# Patient Record
Sex: MALE | Race: BLACK OR AFRICAN AMERICAN | NOT HISPANIC OR LATINO | Employment: FULL TIME | URBAN - METROPOLITAN AREA
[De-identification: names, ages, dates, MRNs, and addresses within clinical notes are randomized per-mention and may not be internally consistent; named-entity substitution may affect disease eponyms.]

---

## 2017-12-12 ENCOUNTER — CLINICAL SUPPORT (OUTPATIENT)
Dept: URGENT CARE | Facility: CLINIC | Age: 27
End: 2017-12-12

## 2017-12-12 DIAGNOSIS — Z23 ENCOUNTER FOR IMMUNIZATION: Primary | ICD-10-CM

## 2017-12-12 PROCEDURE — 86580 TB INTRADERMAL TEST: CPT | Mod: S$GLB,,,

## 2017-12-12 PROCEDURE — 90686 IIV4 VACC NO PRSV 0.5 ML IM: CPT | Mod: S$GLB,,,

## 2017-12-15 LAB
TB INDURATION - 48 HR READ: NORMAL MM
TB INDURATION - 72 HR READ: NORMAL MM
TB SKIN TEST - 48 HR READ: NEGATIVE
TB SKIN TEST - 72 HR READ: NORMAL

## 2018-09-06 ENCOUNTER — HOSPITAL ENCOUNTER (EMERGENCY)
Facility: HOSPITAL | Age: 28
Discharge: HOME | End: 2018-09-06
Attending: EMERGENCY MEDICINE
Payer: OTHER GOVERNMENT

## 2018-09-06 VITALS
BODY MASS INDEX: 27.4 KG/M2 | RESPIRATION RATE: 18 BRPM | HEART RATE: 68 BPM | SYSTOLIC BLOOD PRESSURE: 132 MMHG | TEMPERATURE: 98 F | HEIGHT: 69 IN | DIASTOLIC BLOOD PRESSURE: 80 MMHG | WEIGHT: 185 LBS | OXYGEN SATURATION: 99 %

## 2018-09-06 DIAGNOSIS — R10.9 ABDOMINAL CRAMPING: Primary | ICD-10-CM

## 2018-09-06 DIAGNOSIS — R11.2 NAUSEA, VOMITING, AND DIARRHEA: ICD-10-CM

## 2018-09-06 DIAGNOSIS — R19.7 NAUSEA, VOMITING, AND DIARRHEA: ICD-10-CM

## 2018-09-06 LAB
ALBUMIN SERPL BCP-MCNC: 4.3 G/DL
ALP SERPL-CCNC: 54 U/L
ALT SERPL W/O P-5'-P-CCNC: 26 U/L
AMPHET+METHAMPHET UR QL: NEGATIVE
ANION GAP SERPL CALC-SCNC: 6 MMOL/L
AST SERPL-CCNC: 22 U/L
BARBITURATES UR QL SCN>200 NG/ML: NEGATIVE
BASOPHILS # BLD AUTO: 0.01 K/UL
BASOPHILS NFR BLD: 0.1 %
BENZODIAZ UR QL SCN>200 NG/ML: NEGATIVE
BILIRUB SERPL-MCNC: 0.5 MG/DL
BILIRUB UR QL STRIP: NEGATIVE
BUN SERPL-MCNC: 12 MG/DL
BZE UR QL SCN: NEGATIVE
CALCIUM SERPL-MCNC: 9.5 MG/DL
CANNABINOIDS UR QL SCN: NEGATIVE
CHLORIDE SERPL-SCNC: 103 MMOL/L
CLARITY UR: CLEAR
CO2 SERPL-SCNC: 29 MMOL/L
COLOR UR: YELLOW
CREAT SERPL-MCNC: 1.1 MG/DL
CREAT UR-MCNC: 231.1 MG/DL
DIFFERENTIAL METHOD: ABNORMAL
EOSINOPHIL # BLD AUTO: 0 K/UL
EOSINOPHIL NFR BLD: 0.4 %
ERYTHROCYTE [DISTWIDTH] IN BLOOD BY AUTOMATED COUNT: 12.9 %
EST. GFR  (AFRICAN AMERICAN): >60 ML/MIN/1.73 M^2
EST. GFR  (NON AFRICAN AMERICAN): >60 ML/MIN/1.73 M^2
GLUCOSE SERPL-MCNC: 105 MG/DL
GLUCOSE UR QL STRIP: NEGATIVE
HCT VFR BLD AUTO: 47.5 %
HGB BLD-MCNC: 15.6 G/DL
HGB UR QL STRIP: NEGATIVE
KETONES UR QL STRIP: NEGATIVE
LEUKOCYTE ESTERASE UR QL STRIP: NEGATIVE
LIPASE SERPL-CCNC: 18 U/L
LYMPHOCYTES # BLD AUTO: 1.2 K/UL
LYMPHOCYTES NFR BLD: 15.6 %
MCH RBC QN AUTO: 28.9 PG
MCHC RBC AUTO-ENTMCNC: 32.8 G/DL
MCV RBC AUTO: 88 FL
METHADONE UR QL SCN>300 NG/ML: NEGATIVE
MONOCYTES # BLD AUTO: 0.4 K/UL
MONOCYTES NFR BLD: 5.6 %
NEUTROPHILS # BLD AUTO: 6.1 K/UL
NEUTROPHILS NFR BLD: 78 %
NITRITE UR QL STRIP: NEGATIVE
OPIATES UR QL SCN: NEGATIVE
PCP UR QL SCN>25 NG/ML: NEGATIVE
PH UR STRIP: 7 [PH] (ref 5–8)
PLATELET # BLD AUTO: 292 K/UL
PMV BLD AUTO: 9.2 FL
POTASSIUM SERPL-SCNC: 3.8 MMOL/L
PROT SERPL-MCNC: 7.7 G/DL
PROT UR QL STRIP: ABNORMAL
RBC # BLD AUTO: 5.4 M/UL
SODIUM SERPL-SCNC: 138 MMOL/L
SP GR UR STRIP: 1.02 (ref 1–1.03)
TOXICOLOGY INFORMATION: NORMAL
URN SPEC COLLECT METH UR: ABNORMAL
UROBILINOGEN UR STRIP-ACNC: NEGATIVE EU/DL
WBC # BLD AUTO: 7.86 K/UL

## 2018-09-06 PROCEDURE — 81003 URINALYSIS AUTO W/O SCOPE: CPT | Mod: 59

## 2018-09-06 PROCEDURE — 80307 DRUG TEST PRSMV CHEM ANLYZR: CPT

## 2018-09-06 PROCEDURE — 80053 COMPREHEN METABOLIC PANEL: CPT

## 2018-09-06 PROCEDURE — 83690 ASSAY OF LIPASE: CPT

## 2018-09-06 PROCEDURE — 99284 EMERGENCY DEPT VISIT MOD MDM: CPT | Mod: 25

## 2018-09-06 PROCEDURE — 96372 THER/PROPH/DIAG INJ SC/IM: CPT

## 2018-09-06 PROCEDURE — 96360 HYDRATION IV INFUSION INIT: CPT

## 2018-09-06 PROCEDURE — 25000003 PHARM REV CODE 250: Performed by: EMERGENCY MEDICINE

## 2018-09-06 PROCEDURE — 85025 COMPLETE CBC W/AUTO DIFF WBC: CPT

## 2018-09-06 PROCEDURE — 63600175 PHARM REV CODE 636 W HCPCS: Performed by: EMERGENCY MEDICINE

## 2018-09-06 RX ORDER — DICYCLOMINE HYDROCHLORIDE 10 MG/ML
20 INJECTION INTRAMUSCULAR
Status: COMPLETED | OUTPATIENT
Start: 2018-09-06 | End: 2018-09-06

## 2018-09-06 RX ORDER — ONDANSETRON 8 MG/1
8 TABLET, ORALLY DISINTEGRATING ORAL
Status: COMPLETED | OUTPATIENT
Start: 2018-09-06 | End: 2018-09-06

## 2018-09-06 RX ORDER — ONDANSETRON 8 MG/1
8 TABLET, ORALLY DISINTEGRATING ORAL EVERY 6 HOURS PRN
Qty: 30 TABLET | Refills: 0 | Status: SHIPPED | OUTPATIENT
Start: 2018-09-06 | End: 2018-11-03

## 2018-09-06 RX ORDER — LOPERAMIDE HYDROCHLORIDE 2 MG/1
2 CAPSULE ORAL 4 TIMES DAILY PRN
COMMUNITY
End: 2018-11-03

## 2018-09-06 RX ORDER — DICYCLOMINE HYDROCHLORIDE 20 MG/1
20 TABLET ORAL 4 TIMES DAILY PRN
Qty: 30 TABLET | Refills: 0 | Status: SHIPPED | OUTPATIENT
Start: 2018-09-06 | End: 2018-10-06

## 2018-09-06 RX ADMIN — DICYCLOMINE HYDROCHLORIDE 20 MG: 20 INJECTION, SOLUTION INTRAMUSCULAR at 11:09

## 2018-09-06 RX ADMIN — ONDANSETRON 8 MG: 8 TABLET, ORALLY DISINTEGRATING ORAL at 11:09

## 2018-09-06 RX ADMIN — SODIUM CHLORIDE 1000 ML: 0.9 INJECTION, SOLUTION INTRAVENOUS at 11:09

## 2018-09-06 NOTE — ED NOTES
Patient reports he has had four episodes of abdominal pain this past month.  States fast onset, denies fever, CO nausea and vomiting and watery diarrhea

## 2018-09-06 NOTE — DISCHARGE INSTRUCTIONS
Clear liquids, then bland diet.  Take medications as needed.  Follow up with PCP or GI specialist if your episodes continue.  Keep a food journal to see if you can identify a trigger.

## 2018-09-06 NOTE — ED PROVIDER NOTES
Encounter Date: 9/6/2018    SCRIBE #1 NOTE: I, Abdirahman Hurt, am scribing for, and in the presence of,  Dr. Lakeshia Cristobal. I have scribed the entire note.       History     Chief Complaint   Patient presents with    Abdominal Pain     Generalized abdominal pain with NVD, onset July, 4 episodes last 2-3 days each.      Pradeep Ro is a 28 y.o. male who  has no past medical history on file.    The patient presents to the ED due to generalized abdominal cramping with associated n/v/d that began this morning.  This is his 4th episode in the past 2 months.  He states he wakes with symptoms and they last a few days then resolve.  Pain is worse with palpation and is relieved with rest and time.  He denies any fever or urinary symptoms.  He denies any sick contacts.  Reports he has tried Imodium before with some relief.         The history is provided by the patient.     Review of patient's allergies indicates:  No Known Allergies  History reviewed. No pertinent past medical history.  History reviewed. No pertinent surgical history.  History reviewed. No pertinent family history.  Social History     Tobacco Use    Smoking status: Never Smoker    Smokeless tobacco: Never Used   Substance Use Topics    Alcohol use: Not on file    Drug use: Not on file     Review of Systems   Constitutional: Negative for appetite change and fever.   Gastrointestinal: Positive for abdominal pain, diarrhea, nausea and vomiting.   Genitourinary: Negative for dysuria, frequency, hematuria and urgency.   All other systems reviewed and are negative.      Physical Exam     Initial Vitals [09/06/18 1021]   BP Pulse Resp Temp SpO2   (!) 145/85 94 16 98.3 °F (36.8 °C) 100 %      MAP       --         Physical Exam    Nursing note and vitals reviewed.  Constitutional: He appears well-developed and well-nourished. No distress.   HENT:   Head: Normocephalic and atraumatic.   Eyes: Conjunctivae are normal.   Neck: Normal range of motion.   Cardiovascular:  Normal rate and regular rhythm.   No murmur heard.  Pulmonary/Chest: Breath sounds normal. No respiratory distress.   Abdominal: Soft. Bowel sounds are normal. He exhibits no distension. There is no tenderness. There is no rebound and no guarding.   Musculoskeletal: Normal range of motion.   Neurological: He is alert and oriented to person, place, and time.   Skin: Skin is warm and dry.   Psychiatric: He has a normal mood and affect. His behavior is normal.         ED Course   Procedures  Labs Reviewed   CBC W/ AUTO DIFFERENTIAL - Abnormal; Notable for the following components:       Result Value    Gran% 78.0 (*)     Lymph% 15.6 (*)     All other components within normal limits   COMPREHENSIVE METABOLIC PANEL - Abnormal; Notable for the following components:    Alkaline Phosphatase 54 (*)     Anion Gap 6 (*)     All other components within normal limits   URINALYSIS, REFLEX TO URINE CULTURE - Abnormal; Notable for the following components:    Protein, UA Trace (*)     All other components within normal limits    Narrative:     Preferred Collection Type->Urine, Clean Catch   LIPASE   DRUG SCREEN PANEL, URINE EMERGENCY    Narrative:     Preferred Collection Type->Urine, Clean Catch          Imaging Results    None          Medical Decision Making:   Clinical Tests:   Lab Tests: Reviewed and Ordered  ED Management:  Intermittent episodes of gen abd cramping with n/v/d.  Self limiting.  Most recent episode began today.  Pt looks well.  No fever.  No acute lab abnormalities.  Feels better after treatment in ER.  Likely viral or food intolerance.  Will have pt start food journal.  FOllow up with GI if symptoms continue.  Rx zofran and bentyl.                      Clinical Impression:     1. Abdominal cramping    2. Nausea, vomiting, and diarrhea                  I, Dr. Lakeshia Cristobal, personally performed the services described in this documentation.   All medical record entries made by the scribe were at my direction and in  my presence.   I have reviewed the chart and agree that the record is accurate and complete.   Lakeshia Cristobal MD.  2:20 PM 09/06/2018                    Lakeshia Cristobal MD  09/06/18 [id number]

## 2021-02-06 ENCOUNTER — HOSPITAL ENCOUNTER (EMERGENCY)
Facility: HOSPITAL | Age: 31
Discharge: HOME | End: 2021-02-06
Attending: EMERGENCY MEDICINE
Payer: OTHER GOVERNMENT

## 2021-02-06 VITALS
OXYGEN SATURATION: 95 % | SYSTOLIC BLOOD PRESSURE: 126 MMHG | RESPIRATION RATE: 18 BRPM | TEMPERATURE: 99 F | HEART RATE: 100 BPM | DIASTOLIC BLOOD PRESSURE: 73 MMHG | BODY MASS INDEX: 29.62 KG/M2 | HEIGHT: 69 IN | WEIGHT: 200 LBS

## 2021-02-06 DIAGNOSIS — J02.0 ACUTE STREPTOCOCCAL PHARYNGITIS: Primary | ICD-10-CM

## 2021-02-06 DIAGNOSIS — R05.9 COUGH: ICD-10-CM

## 2021-02-06 LAB
CTP QC/QA: YES
MOLECULAR STREP A: POSITIVE
POC MOLECULAR INFLUENZA A AGN: NEGATIVE
POC MOLECULAR INFLUENZA B AGN: NEGATIVE
SARS-COV-2 RDRP RESP QL NAA+PROBE: NEGATIVE

## 2021-02-06 PROCEDURE — U0002 COVID-19 LAB TEST NON-CDC: HCPCS | Performed by: EMERGENCY MEDICINE

## 2021-02-06 PROCEDURE — 99284 EMERGENCY DEPT VISIT MOD MDM: CPT | Mod: 25

## 2021-02-06 PROCEDURE — 25000003 PHARM REV CODE 250: Performed by: EMERGENCY MEDICINE

## 2021-02-06 PROCEDURE — 63600175 PHARM REV CODE 636 W HCPCS: Mod: JG | Performed by: PHYSICIAN ASSISTANT

## 2021-02-06 PROCEDURE — 96372 THER/PROPH/DIAG INJ SC/IM: CPT

## 2021-02-06 PROCEDURE — 87502 INFLUENZA DNA AMP PROBE: CPT

## 2021-02-06 RX ORDER — ACETAMINOPHEN 500 MG
1000 TABLET ORAL
Status: COMPLETED | OUTPATIENT
Start: 2021-02-06 | End: 2021-02-06

## 2021-02-06 RX ADMIN — ACETAMINOPHEN 1000 MG: 500 TABLET ORAL at 09:02

## 2021-02-06 RX ADMIN — PENICILLIN G BENZATHINE 1.2 MILLION UNITS: 1200000 INJECTION, SUSPENSION INTRAMUSCULAR at 11:02

## 2021-04-15 ENCOUNTER — PATIENT MESSAGE (OUTPATIENT)
Dept: RESEARCH | Facility: HOSPITAL | Age: 31
End: 2021-04-15

## 2021-12-27 ENCOUNTER — HOSPITAL ENCOUNTER (EMERGENCY)
Facility: HOSPITAL | Age: 31
Discharge: HOME | End: 2021-12-27
Attending: EMERGENCY MEDICINE
Payer: OTHER GOVERNMENT

## 2021-12-27 VITALS
WEIGHT: 200 LBS | OXYGEN SATURATION: 100 % | TEMPERATURE: 98 F | DIASTOLIC BLOOD PRESSURE: 88 MMHG | RESPIRATION RATE: 18 BRPM | HEART RATE: 75 BPM | BODY MASS INDEX: 29.53 KG/M2 | SYSTOLIC BLOOD PRESSURE: 149 MMHG

## 2021-12-27 DIAGNOSIS — U07.1 COVID-19: Primary | ICD-10-CM

## 2021-12-27 LAB
CTP QC/QA: YES
SARS-COV-2 RDRP RESP QL NAA+PROBE: POSITIVE

## 2021-12-27 PROCEDURE — 99282 EMERGENCY DEPT VISIT SF MDM: CPT | Mod: 25

## 2021-12-27 PROCEDURE — U0002 COVID-19 LAB TEST NON-CDC: HCPCS | Performed by: EMERGENCY MEDICINE

## 2021-12-27 NOTE — Clinical Note
"Pradeep"Radha Ro was seen and treated in our emergency department on 12/27/2021.     COVID-19 is present in our communities across the state. There is limited testing for COVID at this time, so not all patients can be tested. In this situation, your employee meets the following criteria:    Pradeep Ro has met the criteria for COVID-19 testing and has a POSITIVE result. He can return to work once they are asymptomatic for 72 hours without the use of fever reducing medications AND at least ten days from the first positive result.     If you have any questions or concerns, or if I can be of further assistance, please do not hesitate to contact me.    Sincerely,             Vipul Bob PA-C"

## 2021-12-27 NOTE — DISCHARGE INSTRUCTIONS
Drink lots of fluids, stay well hydrated. Tylenol (650mg) / Ibuprofen (600mg) as needed for discomfort; go back and forth between these two medications every 4 hrs as needed for temp greater than or equal to 100.4F, as needed for congestion/headache/body aches. Flonase for congestion. Robitussin for cough.     Continue home quarantine.    Follow-up with primary care provider for reevaluation, further recommendations. Return to this ED if unable to treat fever, if symptoms persist or worsen despite treatment, if you begin with shortness of breath or difficulty breathing, if any other problems occur.

## 2021-12-28 NOTE — ED PROVIDER NOTES
Encounter Date: 12/27/2021       History     Chief Complaint   Patient presents with    COVID-19 Concerns     Pt reporting runny nose, congestion, sore throat, body aches x 2 days. Pt denies med hx.      32yo M with congestion, rhinorrhea, myalgias, odynophagia x2d. +sick contact. No SOB. No alleviating/exacerbating factors. No radiation of symptoms. Symptoms acute, constant, mild.         Review of patient's allergies indicates:  No Known Allergies  History reviewed. No pertinent past medical history.  History reviewed. No pertinent surgical history.  History reviewed. No pertinent family history.  Social History     Tobacco Use    Smoking status: Never Smoker    Smokeless tobacco: Never Used   Substance Use Topics    Alcohol use: No    Drug use: No     Review of Systems   Constitutional: Negative for fever.   HENT: Positive for congestion, rhinorrhea and sore throat.    Respiratory: Negative for shortness of breath.    Musculoskeletal: Positive for myalgias.       Physical Exam     Initial Vitals [12/27/21 1359]   BP Pulse Resp Temp SpO2   (!) 149/88 75 18 98 °F (36.7 °C) 100 %      MAP       --         Physical Exam    Nursing note and vitals reviewed.  Constitutional: He appears well-developed and well-nourished. He is not diaphoretic. No distress.   HENT:   Head: Normocephalic and atraumatic.   Neck: Neck supple.   Normal range of motion.  Pulmonary/Chest: No respiratory distress.   Musculoskeletal:      Cervical back: Normal range of motion and neck supple.     Neurological: He is alert and oriented to person, place, and time. GCS score is 15. GCS eye subscore is 4. GCS verbal subscore is 5. GCS motor subscore is 6.   Skin: Skin is warm.         ED Course   Procedures  Labs Reviewed   SARS-COV-2 RDRP GENE - Abnormal; Notable for the following components:       Result Value    POC Rapid COVID Positive (*)     All other components within normal limits          Imaging Results    None          Medications -  No data to display  Medical Decision Making:   Differential Diagnosis:   Viral illness, pneumonia, bronchitis, pharyngitis  ED Management:  COVID positive. Continue supportive tx. F/u as outpatient.                       Clinical Impression:   Final diagnoses:  [U07.1] COVID-19 (Primary)          ED Disposition Condition    Discharge Stable        ED Prescriptions     None        Follow-up Information     Follow up With Specialties Details Why Contact Info    Primary care provider  Schedule an appointment as soon as possible for a visit  For reevaluation            Vipul Bob PA-C  12/28/21 1132

## 2024-02-01 ENCOUNTER — OFFICE VISIT (OUTPATIENT)
Dept: PULMONOLOGY | Facility: CLINIC | Age: 34
End: 2024-02-01
Payer: OTHER GOVERNMENT

## 2024-02-01 VITALS
WEIGHT: 207.81 LBS | OXYGEN SATURATION: 98 % | BODY MASS INDEX: 30.78 KG/M2 | HEART RATE: 86 BPM | SYSTOLIC BLOOD PRESSURE: 141 MMHG | DIASTOLIC BLOOD PRESSURE: 96 MMHG | HEIGHT: 69 IN

## 2024-02-01 DIAGNOSIS — G47.19 EXCESSIVE DAYTIME SLEEPINESS: ICD-10-CM

## 2024-02-01 DIAGNOSIS — E66.01 MORBID OBESITY: ICD-10-CM

## 2024-02-01 DIAGNOSIS — G47.33 OSA (OBSTRUCTIVE SLEEP APNEA): Primary | ICD-10-CM

## 2024-02-01 PROCEDURE — 99204 OFFICE O/P NEW MOD 45 MIN: CPT | Mod: S$PBB,,, | Performed by: INTERNAL MEDICINE

## 2024-02-01 PROCEDURE — 99999 PR PBB SHADOW E&M-EST. PATIENT-LVL III: CPT | Mod: PBBFAC,,, | Performed by: INTERNAL MEDICINE

## 2024-02-01 PROCEDURE — 99213 OFFICE O/P EST LOW 20 MIN: CPT | Mod: PBBFAC | Performed by: INTERNAL MEDICINE

## 2024-02-01 RX ORDER — MELOXICAM 15 MG/1
15 TABLET ORAL DAILY PRN
COMMUNITY
Start: 2023-12-04 | End: 2024-02-21

## 2024-02-01 RX ORDER — ATORVASTATIN CALCIUM 20 MG/1
TABLET, FILM COATED ORAL
COMMUNITY
Start: 2023-02-06 | End: 2024-02-06

## 2024-02-01 NOTE — PROGRESS NOTES
Subjective:   Patient ID: Pradeep Ro is a 33 y.o. male    Chief Complaint:   Chief Complaint   Patient presents with    Apnea       Referred by [facility]    HPI  Pradeep Ro is a 33 y.o. male who was referred by [facility] for a sleep evaluation for sleep disordered breathing The patient  has no past medical history on file.   His wife presents with him today.  He is in the [employer]. He is currently in duty.    The patient reports diurnal fatigue.  The patient reports witnessed snoring noticed by his wife. There is no clear witnessed apneas. He has  awoken from a snore and jumps from sleep.  Snoring is usually of moderate intensity.  When he wakes up from sleep, he does  feel un refreshed.     There is also reported dry mouth and grinding of teeth when they wake up.      Patient does  drink  caffeinated beverages daily.    Weight: Patient reports gaining weight over the last several years.    Wt Readings from Last 150 Encounters:   02/01/24 94.3 kg (207 lb 12.5 oz)   12/27/21 90.7 kg (200 lb)   02/06/21 90.7 kg (200 lb)   11/03/18 90.3 kg (199 lb)   09/06/18 83.9 kg (185 lb)   02/12/15 73.9 kg (163 lb)       Symptoms of restless legs syndrome are not reported.    Symptoms began several years ago.    Prior sleep evaluation: none    Sleep medication taken: none.    Other sleep remedies: none    Sleep summary:  Bedtime: 10 - 11 PM  Sleep Latency: 10 min  # Awakenings: 3+  WASO (wakefulness after sleep onset) a few min  Risetime: 6:45 AM to help with the kids for school, then back to bed till 10 - 11 AM    Naps are  taken frequently.    Other relevant sleep data:  Bed partner is  present.  There is  evidence of choking awakening.   Apneas while asleep have  been reported by others.  Headaches are  frequent.  Bruxism is not reported at this time.  Currently, vivid dreams are  reported, nightmares are reported, sleep paralysis is  reported, cataplexy is not reported, hypnagogic hallucinations are  not reported and hypnopompic hallucinations are not reported.      CONTRIBUTING and/or CONFOUNDING FACTORS:    Nocturnal pain:   n    Nocturia >2/night:   n  Heartburn/GI pain:   n  Environment:    n  Bed partner noise/movements : n      Patient provided ESS:    Julian Sleepiness Scale TOTAL   (validated sleepiness questionnaire with a higher score indicating greater sleepiness; range 0-24)      2/1/2024     3:11 PM   EPWORTH SLEEPINESS SCALE   Sitting and reading 2   Watching TV 1   Sitting, inactive in a public place (e.g. a theatre or a meeting) 1   As a passenger in a car for an hour without a break 3   Lying down to rest in the afternoon when circumstances permit 3   Sitting and talking to someone 0   Sitting quietly after a lunch without alcohol 3   In a car, while stopped for a few minutes in traffic 0   Total score 13         STOP BANG questionnaire:    Snoring present: y  Tiredness present:y  Obstruction (apneas/choking episodes): y  Pressure (HTN): y    BMI greater than 35 kg/m2: n  Age greater than 50 years old: n  Neck circumference > than 17 inches if male or > than 16 inches if female : y  Gender being male: y    Total STOP BANG score = 6/8  Low risk JUAN RAMON: 0-2, Intermediate risk JUAN RAMON: 3-4, High risk JUAN RAMON: 5+        Most Recent Vital Signs:    The patient's body mass index is 30.68 kg/m².    Wt Readings from Last 5 Encounters:   02/01/24 94.3 kg (207 lb 12.5 oz)   12/27/21 90.7 kg (200 lb)   02/06/21 90.7 kg (200 lb)   11/03/18 90.3 kg (199 lb)   09/06/18 83.9 kg (185 lb)     BMI Readings from Last 5 Encounters:   02/01/24 30.68 kg/m²   12/27/21 29.53 kg/m²   02/06/21 29.53 kg/m²   11/03/18 29.39 kg/m²   09/06/18 27.32 kg/m²     Pulse Readings from Last 3 Encounters:   02/01/24 86   12/27/21 75   02/06/21 100         Current Outpatient Medications:     atorvastatin (LIPITOR) 20 MG tablet, Take with food/milk.Take or use exactly as directed.Obtain advice for OTCs.Do not take if pregnant.Avoid grapefruit  "and grapefruit juice., Disp: , Rfl:     meloxicam (MOBIC) 15 MG tablet, Take 15 mg by mouth daily as needed., Disp: , Rfl:     naproxen (NAPROSYN) 500 MG tablet, Take 1 tablet (500 mg total) by mouth 2 (two) times daily with meals., Disp: 60 tablet, Rfl: 0       Objective:   Vitals reviewed   Physical Exam  Constitutional:       Appearance: Normal appearance.   HENT:      Head: Normocephalic.      Mouth/Throat:      Comments: Mallampati III, crowded upper airway  Neck:      Comments: 17.5"  Pulmonary:      Effort: Pulmonary effort is normal.   Musculoskeletal:      Cervical back: Normal range of motion and neck supple.   Neurological:      General: No focal deficit present.      Mental Status: He is alert and oriented to person, place, and time.   Psychiatric:         Mood and Affect: Mood normal.         Behavior: Behavior normal.         Assessment:     Problem List Items Addressed This Visit    None  Visit Diagnoses       JUAN RAMON (obstructive sleep apnea)    -  Primary    Relevant Orders    Polysomnogram (CPAP will be added if patient meets diagnostic criteria.)    Excessive daytime sleepiness        Relevant Orders    Polysomnogram (CPAP will be added if patient meets diagnostic criteria.)    Morbid obesity        Relevant Orders    Polysomnogram (CPAP will be added if patient meets diagnostic criteria.)              Plan:       Comorbid hypertension and weight gain    Could likely be secondary to sleep disordered breathing of obstructive origin.  Reviewed STOP BANG and ESS.    Patient has been informed about the pathophysiology and prognosis associated with JUAN RAMON and CSA and has been advised to undergo a baseline Polysomnography (PSG) study for further evaluation of his sleep disorder breathing.    Patient understood and agreed to undergo for a PSG study. Will order Home Sleep Study and proceed with further studies or prescription for CPAP as appropriate.    Patient has been advised to loose weight through a diet and " exercise plan.    Today's office encounter included review of salient clinical notes from others involved in the care of the patient, discrete laboratory elements and, as available, prior and present intervention for the disturbance of sleep.  The information gleaned was used in establishing the diagnosis and in stratification of disease risk.    The patient has a disturbance of sleep with clinically relevant potential for adverse behavioral, cardiovascular, and metabolic outcomes.  Untreated, the disturbance of sleep can have significant effect on mental health, clinical health and survival.       Will follow-up patient with results of PSG

## 2024-02-20 ENCOUNTER — PATIENT MESSAGE (OUTPATIENT)
Dept: PULMONOLOGY | Facility: CLINIC | Age: 34
End: 2024-02-20
Payer: OTHER GOVERNMENT

## 2024-02-20 ENCOUNTER — TELEPHONE (OUTPATIENT)
Dept: SLEEP MEDICINE | Facility: HOSPITAL | Age: 34
End: 2024-02-20
Payer: OTHER GOVERNMENT

## 2024-02-21 ENCOUNTER — OFFICE VISIT (OUTPATIENT)
Dept: INTERNAL MEDICINE | Facility: CLINIC | Age: 34
End: 2024-02-21
Payer: OTHER GOVERNMENT

## 2024-02-21 VITALS
WEIGHT: 210.75 LBS | HEART RATE: 100 BPM | RESPIRATION RATE: 18 BRPM | SYSTOLIC BLOOD PRESSURE: 138 MMHG | OXYGEN SATURATION: 98 % | BODY MASS INDEX: 31.21 KG/M2 | DIASTOLIC BLOOD PRESSURE: 84 MMHG | HEIGHT: 69 IN

## 2024-02-21 DIAGNOSIS — I10 PRIMARY HYPERTENSION: ICD-10-CM

## 2024-02-21 DIAGNOSIS — E78.2 MIXED HYPERLIPIDEMIA: ICD-10-CM

## 2024-02-21 DIAGNOSIS — Z23 NEED FOR VACCINATION: ICD-10-CM

## 2024-02-21 DIAGNOSIS — Z00.01 ENCOUNTER FOR ANNUAL GENERAL MEDICAL EXAMINATION WITH ABNORMAL FINDINGS IN ADULT: Primary | ICD-10-CM

## 2024-02-21 DIAGNOSIS — Z76.89 ENCOUNTER TO ESTABLISH CARE WITH NEW DOCTOR: ICD-10-CM

## 2024-02-21 PROBLEM — E78.5 HYPERLIPIDEMIA, UNSPECIFIED: Status: ACTIVE | Noted: 2024-02-21

## 2024-02-21 PROBLEM — M54.50 LOW BACK PAIN, UNSPECIFIED: Status: ACTIVE | Noted: 2023-12-20

## 2024-02-21 PROBLEM — N52.9 ERECTILE DYSFUNCTION: Status: ACTIVE | Noted: 2024-02-21

## 2024-02-21 PROCEDURE — 99385 PREV VISIT NEW AGE 18-39: CPT | Mod: S$PBB,,, | Performed by: FAMILY MEDICINE

## 2024-02-21 PROCEDURE — 99999 PR PBB SHADOW E&M-EST. PATIENT-LVL IV: CPT | Mod: PBBFAC,,, | Performed by: FAMILY MEDICINE

## 2024-02-21 PROCEDURE — 99214 OFFICE O/P EST MOD 30 MIN: CPT | Mod: PBBFAC | Performed by: FAMILY MEDICINE

## 2024-02-21 RX ORDER — AMLODIPINE BESYLATE 5 MG/1
5 TABLET ORAL DAILY
Qty: 90 TABLET | Refills: 3 | Status: SHIPPED | OUTPATIENT
Start: 2024-02-21 | End: 2025-02-20

## 2024-02-21 NOTE — PROGRESS NOTES
Subjective:     Patient ID: Pradeep Ro is a 33 y.o. male.   Chief Complaint: Annual Exam    HPI:  Patient presents to [facility] care.  Patient is due for annual exam and labs.    Concerned about his BP. Has cuff at home. Also running high. Highest was 151/101. Usually in the 140s SBP. C/o HA which has been a/w high readings.    Has hx HLP, rx'd atorvastatin but says he has not been taking it.    Reports he does not like taking medication.    Pt is [employer].    Review of Problems & History:  Patient Active Problem List   Diagnosis    Erectile dysfunction    Hyperlipidemia, unspecified    Low back pain, unspecified      No past medical history on file.   No past surgical history on file.   Social History     Socioeconomic History    Marital status:    Tobacco Use    Smoking status: Never    Smokeless tobacco: Never   Substance and Sexual Activity    Alcohol use: Yes     Alcohol/week: 6.0 standard drinks of alcohol     Types: 3 Shots of liquor, 3 Drinks containing 0.5 oz of alcohol per week    Drug use: No    Sexual activity: Yes     Partners: Female     Birth control/protection: OCP     Social Determinants of Health     Financial Resource Strain: Low Risk  (2/21/2024)    Overall Financial Resource Strain (CARDIA)     Difficulty of Paying Living Expenses: Not hard at all   Food Insecurity: No Food Insecurity (2/21/2024)    Hunger Vital Sign     Worried About Running Out of Food in the Last Year: Never true     Ran Out of Food in the Last Year: Never true   Transportation Needs: No Transportation Needs (2/21/2024)    PRAPARE - Transportation     Lack of Transportation (Medical): No     Lack of Transportation (Non-Medical): No   Physical Activity: Insufficiently Active (2/21/2024)    Exercise Vital Sign     Days of Exercise per Week: 1 day     Minutes of Exercise per Session: 10 min   Stress: No Stress Concern Present (2/21/2024)    [place] [employer] - Occupational Stress Questionnaire     " Feeling of Stress : Not at all   Social Connections: Unknown (2/21/2024)    Social Connection and Isolation Panel [NHANES]     Frequency of Communication with Friends and Family: Twice a week     Frequency of Social Gatherings with Friends and Family: Twice a week     Active Member of Clubs or Organizations: No     Attends Club or Organization Meetings: Patient declined     Marital Status:    Housing Stability: Low Risk  (2/21/2024)    Housing Stability Vital Sign     Unable to Pay for Housing in the Last Year: No     Number of Places Lived in the Last Year: 1     Unstable Housing in the Last Year: No      Health Maintenance:  Colon Cancer Screening  Screening not indicated by patient's age & family history  Lung Cancer Screening  Never smoker  Prostate Cancer Screening  Not indicated by age or history  ASCVD Risk  The ASCVD Risk score (Quentin GUADARRAMA, et al., 2019) failed to calculate for the following reasons:    The 2019 ASCVD risk score is only valid for ages 40 to 79   No results found for: "CHOL", "HDL", "LDLDIRECT", "TRIG"  Statin rx: non-compliant    Vaccines  Seasonal Influenza: UTD  COVID-19: Due for booster  RSV: Not indicated  Tetanus: UTD  Shingles: Not indicated  Pneumococcal: Not indicated    Medication Review:    Current Outpatient Medications:     amLODIPine (NORVASC) 5 MG tablet, Take 1 tablet (5 mg total) by mouth once daily., Disp: 90 tablet, Rfl: 3    atorvastatin (LIPITOR) 20 MG tablet, Take with food/milk.Take or use exactly as directed.Obtain advice for OTCs.Do not take if pregnant.Avoid grapefruit and grapefruit juice., Disp: , Rfl:      Review of Systems   Constitutional:  Negative for chills, fatigue and fever.   HENT:  Negative for nasal congestion, ear pain, postnasal drip and sore throat.    Eyes:  Negative for visual disturbance.   Respiratory:  Negative for cough, shortness of breath and wheezing.    Cardiovascular:  Negative for chest pain and palpitations.   Gastrointestinal:  " "Negative for abdominal pain, change in bowel habit, constipation, diarrhea, nausea and vomiting.   Genitourinary:  Negative for dysuria and hematuria.   Musculoskeletal:  Negative for back pain, leg pain and neck pain.   Neurological:  Positive for headaches. Negative for dizziness and numbness.   Hematological:  Negative for adenopathy.   Psychiatric/Behavioral:  Negative for dysphoric mood, sleep disturbance and suicidal ideas. The patient is not nervous/anxious.           Objective:      Vitals:    02/21/24 1042 02/21/24 1057   BP: (!) 141/88 138/84   BP Location: Left arm Left arm   Patient Position: Sitting Sitting   BP Method:  Large (Manual)   Pulse: 100    Resp: 18    SpO2: 98%    Weight: 95.6 kg (210 lb 12.2 oz)    Height: 5' 9" (1.753 m)       Physical Exam  Vitals and nursing note reviewed.   Constitutional:       General: He is not in acute distress.     Appearance: Normal appearance. He is not ill-appearing.   HENT:      Head: Normocephalic and atraumatic.      Right Ear: Tympanic membrane, ear canal and external ear normal. There is no impacted cerumen.      Left Ear: Tympanic membrane, ear canal and external ear normal. There is no impacted cerumen.      Nose: Nose normal. No congestion or rhinorrhea.      Mouth/Throat:      Mouth: Mucous membranes are moist.      Pharynx: Oropharynx is clear. No oropharyngeal exudate or posterior oropharyngeal erythema.   Eyes:      General: No scleral icterus.        Right eye: No discharge.         Left eye: No discharge.      Conjunctiva/sclera: Conjunctivae normal.   Neck:      Thyroid: No thyroid mass, thyromegaly or thyroid tenderness.   Cardiovascular:      Rate and Rhythm: Normal rate and regular rhythm.      Pulses: Normal pulses.      Heart sounds: Normal heart sounds. No murmur heard.     No friction rub. No gallop.   Pulmonary:      Effort: Pulmonary effort is normal. No respiratory distress.      Breath sounds: Normal breath sounds. No wheezing, rhonchi " or rales.   Abdominal:      General: Abdomen is flat. Bowel sounds are normal. There is no distension.      Palpations: Abdomen is soft. There is no mass.      Tenderness: There is no abdominal tenderness. There is no guarding.   Musculoskeletal:         General: No swelling or deformity. Normal range of motion.      Cervical back: Normal range of motion and neck supple. No tenderness.   Lymphadenopathy:      Cervical: No cervical adenopathy.   Skin:     General: Skin is warm and dry.   Neurological:      General: No focal deficit present.      Mental Status: He is alert and oriented to person, place, and time. Mental status is at baseline.      Gait: Gait normal.      Deep Tendon Reflexes: Reflexes normal.   Psychiatric:         Mood and Affect: Mood normal.         Behavior: Behavior normal.         Thought Content: Thought content normal.         Judgment: Judgment normal.           Assessment:       Problem List Items Addressed This Visit          Cardiac/Vascular    Hyperlipidemia, unspecified    Relevant Orders    Lipid Panel     Other Visit Diagnoses       Encounter for annual general medical examination with abnormal findings in adult    -  Primary    Relevant Orders    Comprehensive Metabolic Panel    CBC Auto Differential    Lipid Panel    Hemoglobin A1C    TSH    T4, Free    Hepatitis C Antibody    HIV 1/2 Ag/Ab (4th Gen)    Encounter to establish care with new doctor        Need for vaccination        Primary hypertension        Relevant Medications    amLODIPine (NORVASC) 5 MG tablet              Plan:       1. Encounter for annual general medical examination with abnormal findings in adult  Health maintenance updated  Chronic issues reviewed  Fasting labs ordered  - Comprehensive Metabolic Panel; Future  - CBC Auto Differential; Future  - Lipid Panel; Future  - Hemoglobin A1C; Future  - TSH; Future  - T4, Free; Future  - Hepatitis C Antibody; Future  - HIV 1/2 Ag/Ab (4th Gen); Future    2. Encounter to  establish care with new doctor  Reviewed chronic medical conditions, updated problem list and medication list    3. Need for vaccination  COVID declined    4. Mixed hyperlipidemia  Pt not taking atorvastatin  Check lipid panel, will determine if meds are needed pending results  - Lipid Panel; Future    5. 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  Dispense: 90 tablet; Refill: 3          Follow up in about 6 months (around 8/21/2024) for Medication Titration.     Mauro Gilmore MD, FAAFP  Family Medicine Physician  [facility]  02/21/2024

## 2024-02-21 NOTE — PATIENT INSTRUCTIONS
Fasting labs - results will be communicated through the patient portal     Send me an update on your blood pressure at the end of two weeks. Your pressure your read under 140/90.

## 2024-02-22 ENCOUNTER — OFFICE VISIT (OUTPATIENT)
Dept: GASTROENTEROLOGY | Facility: CLINIC | Age: 34
End: 2024-02-22
Payer: OTHER GOVERNMENT

## 2024-02-22 ENCOUNTER — TELEPHONE (OUTPATIENT)
Dept: SLEEP MEDICINE | Facility: HOSPITAL | Age: 34
End: 2024-02-22
Payer: OTHER GOVERNMENT

## 2024-02-22 DIAGNOSIS — R15.2 FECAL URGENCY: Primary | ICD-10-CM

## 2024-02-22 PROCEDURE — 99204 OFFICE O/P NEW MOD 45 MIN: CPT | Mod: 95,,,

## 2024-02-22 NOTE — PROGRESS NOTES
GASTROENTEROLOGY CLINIC NOTE    The patient location is: [place]  Visit type: audiovisual  Face to Face time with patient: 8mins  21 minutes of total time spent on the encounter, which includes face to face time and non-face to face time preparing to see the patient (eg, review of tests), Obtaining and/or reviewing separately obtained history, Documenting clinical information in the electronic or other health record, Independently interpreting results (not separately reported) and communicating results to the patient/family/caregiver, or Care coordination (not separately reported).     HPI:  Pradeep Ro is a 33 y.o. male presents today for loose stool. He reports typically having BM's within an hour or so after eating. Since November he has urgency and will have BM within short time after eating. BM's are soft, no blood, no nocturnal BM's, no abd pain. He has tried taking imodium which causes severe constipation. [facility] [place] [facility] [employer] [facility] and returned in November. No change in medications at this time. No known FH of CRC.     Prior Endoscopy:  EGD:  Colon:    (Portions of this note were dictated using voice recognition software and may contain dictation related errors in spelling/grammar/syntax not found on text review)    Review of Systems   Gastrointestinal:  Negative for abdominal pain and blood in stool.       Past Medical History: has no past medical history on file.    Past Surgical History: has no past surgical history on file.    Home medications:   Current Outpatient Medications on File Prior to Visit   Medication Sig Dispense Refill    amLODIPine (NORVASC) 5 MG tablet Take 1 tablet (5 mg total) by mouth once daily. 90 tablet 3    atorvastatin (LIPITOR) 20 MG tablet Take with food/milk.Take or use exactly as directed.Obtain advice for OTCs.Do not take if pregnant.Avoid grapefruit and grapefruit juice.       No current facility-administered medications on file prior to visit.       Vital  signs:  There were no vitals taken for this visit.    Physical Exam  Vitals reviewed: limited d/t telemed.   Constitutional:       Appearance: Normal appearance.   Neurological:      Mental Status: He is alert.       Each patient to whom he or she provides medical services by telemedicine is:  (1) informed of the relationship between the physician and patient and the respective role of any other health care provider with respect to management of the patient; and (2) notified that he or she may decline to receive medical services by telemedicine and may withdraw from such care at any time.    I have reviewed associated labs, imaging and notes.     Assessment:  1. Loose stools    3+ BM/day, occurring right after eating   Associated urgency   Worsened over past few months  No abd pain, no blood, no FH  Imodium causes constipation    Complete stool studies to r/o pathogenic or inflammatory causes of fecal urgency. Check blood work as well. No warning signs present. Consider colonoscopy vs bentyl pending ss results.     Plan:  Orders Placed This Encounter    Stool culture    Calprotectin, Stool    H. pylori antigen, stool    IgA    WBC, Stool    Tissue Transglutaminase, IgA    Stool Exam-Ova,Cysts,Parasites     Collect stool studies and blood work    F/U after above workup completed    Lindsey Ray, NP [facility]

## 2024-02-23 ENCOUNTER — HOSPITAL ENCOUNTER (OUTPATIENT)
Dept: SLEEP MEDICINE | Facility: HOSPITAL | Age: 34
Discharge: HOME | End: 2024-02-23
Attending: INTERNAL MEDICINE
Payer: OTHER GOVERNMENT

## 2024-02-23 DIAGNOSIS — G47.33 OSA (OBSTRUCTIVE SLEEP APNEA): ICD-10-CM

## 2024-02-23 DIAGNOSIS — E66.01 MORBID OBESITY: ICD-10-CM

## 2024-02-23 DIAGNOSIS — G47.19 EXCESSIVE DAYTIME SLEEPINESS: ICD-10-CM

## 2024-02-23 PROCEDURE — 95810 POLYSOM 6/> YRS 4/> PARAM: CPT

## 2024-02-24 PROBLEM — G47.33 OSA (OBSTRUCTIVE SLEEP APNEA): Status: ACTIVE | Noted: 2024-02-24

## 2024-02-24 NOTE — PROGRESS NOTES
A diagnostic study was performed on  Coolest Cooler. The entire procedure was explained, including Bio calibration procedure, patient was informed that there may be a need to enter the room during the night to fix lead or make adjustments to the equipment. Questions were answered prior to start of study. He  was given instructions on how to call out for help including how to use the nurse call unit in the bathroom. Patient was given [employer] phone number to call if patient needed tech for anything, in case tech was out of tech room.  Patient education was performed. This includes the possible use of CPAP machine and different CPAP masks. He was given the after visit summary.   The lowest oxygen saturation observed during sleep was 86%   He did not meet criteria for a split night study due to insufficient amount of events during the 1st part of the study   The EKG appeared to be NSR  Arousals and moderate snoring, supine Rem was observed.

## 2024-02-26 ENCOUNTER — PATIENT MESSAGE (OUTPATIENT)
Dept: PHYSICAL MEDICINE AND REHAB | Facility: CLINIC | Age: 34
End: 2024-02-26

## 2024-02-26 ENCOUNTER — HOSPITAL ENCOUNTER (OUTPATIENT)
Dept: RADIOLOGY | Facility: HOSPITAL | Age: 34
Discharge: HOME | End: 2024-02-26
Attending: PHYSICAL MEDICINE & REHABILITATION
Payer: OTHER GOVERNMENT

## 2024-02-26 ENCOUNTER — OFFICE VISIT (OUTPATIENT)
Dept: PHYSICAL MEDICINE AND REHAB | Facility: CLINIC | Age: 34
End: 2024-02-26
Payer: OTHER GOVERNMENT

## 2024-02-26 VITALS — BODY MASS INDEX: 31.26 KG/M2 | HEIGHT: 69 IN | OXYGEN SATURATION: 98 % | WEIGHT: 211.06 LBS

## 2024-02-26 DIAGNOSIS — M54.42 CHRONIC BILATERAL LOW BACK PAIN WITH BILATERAL SCIATICA: ICD-10-CM

## 2024-02-26 DIAGNOSIS — M54.41 CHRONIC BILATERAL LOW BACK PAIN WITH BILATERAL SCIATICA: Primary | ICD-10-CM

## 2024-02-26 DIAGNOSIS — M54.41 CHRONIC BILATERAL LOW BACK PAIN WITH BILATERAL SCIATICA: ICD-10-CM

## 2024-02-26 DIAGNOSIS — G89.29 CHRONIC BILATERAL LOW BACK PAIN WITH BILATERAL SCIATICA: Primary | ICD-10-CM

## 2024-02-26 DIAGNOSIS — M54.42 CHRONIC BILATERAL LOW BACK PAIN WITH BILATERAL SCIATICA: Primary | ICD-10-CM

## 2024-02-26 DIAGNOSIS — E66.9 OBESITY (BMI 30.0-34.9): ICD-10-CM

## 2024-02-26 DIAGNOSIS — G89.29 CHRONIC BILATERAL LOW BACK PAIN WITH BILATERAL SCIATICA: ICD-10-CM

## 2024-02-26 PROCEDURE — 99999 PR PBB SHADOW E&M-EST. PATIENT-LVL III: CPT | Mod: PBBFAC,,, | Performed by: PHYSICAL MEDICINE & REHABILITATION

## 2024-02-26 PROCEDURE — 72110 X-RAY EXAM L-2 SPINE 4/>VWS: CPT | Mod: 26,,, | Performed by: RADIOLOGY

## 2024-02-26 PROCEDURE — 72110 X-RAY EXAM L-2 SPINE 4/>VWS: CPT | Mod: TC

## 2024-02-26 PROCEDURE — 99204 OFFICE O/P NEW MOD 45 MIN: CPT | Mod: S$PBB,,, | Performed by: PHYSICAL MEDICINE & REHABILITATION

## 2024-02-26 PROCEDURE — 99213 OFFICE O/P EST LOW 20 MIN: CPT | Mod: PBBFAC | Performed by: PHYSICAL MEDICINE & REHABILITATION

## 2024-02-26 RX ORDER — GABAPENTIN 300 MG/1
300 CAPSULE ORAL NIGHTLY
Qty: 90 CAPSULE | Refills: 2 | Status: SHIPPED | OUTPATIENT
Start: 2024-02-26 | End: 2024-03-27

## 2024-02-26 RX ORDER — ACETAMINOPHEN 500 MG
500-1000 TABLET ORAL 3 TIMES DAILY PRN
Refills: 0 | COMMUNITY
Start: 2024-02-26

## 2024-02-26 RX ORDER — MELOXICAM 15 MG/1
15 TABLET ORAL DAILY
Qty: 30 TABLET | Refills: 2 | Status: SHIPPED | OUTPATIENT
Start: 2024-02-26 | End: 2024-05-26

## 2024-02-26 NOTE — PROGRESS NOTES
Subjective:       Patient ID: Pradeep Ro is a 33 y.o. male.    Chief Complaint: No chief complaint on file.      HPI    Mr. Ro is a 33-year-old male with past medical history of hyperlipidemia JUAN RAMON.  He was referred to the Physical Medicine Clinic for help chronic low back pain.    The patient started complaining of low back pain in June or July of 2023.  He denies any preceding injuries.  However, he is in the [employer] and his regimen is strenuous.  His pain got progressively worse.  He was evaluated months ago at a Clinic outside [facility].  He reports that x-rays were unremarkable.  He was referred to physical therapy and had several sessions.  It did not help much.  He reports that his pain has been gradually worse.      Currently, his back pain is a constant aching and soreness in the lower lumbar spine and across his back.  Has occasional shooting pain to both lower extremities to the mid posterior thighs with throbbing sensations.  His pain is aggravated by prolonged sitting, heavy lifting and bending.  It is better with stretching and lying flat on his back.  His maximum pain is 7-8/10 and minimum 3-4/10.  Today it is 7-8/10.  He denies any lower extremity weakness.  He denies any leg numbness.  He denies any bowel or bladder incontinence.  He denies any leg claudications.  Denies any saddle anesthesia.    He is currently on:   Meloxicam 15 mg p.o. q.d..  However he has been out for about 2 months.    Past Medical History:   Diagnosis Date    Hyperlipidemia, unspecified 02/21/2024    JUAN RAMON (obstructive sleep apnea) 02/24/2024        Review of patient's allergies indicates:  No Known Allergies     Review of Systems   Constitutional:  Negative for chills and fever.   Eyes:  Negative for visual disturbance.   Respiratory:  Negative for shortness of breath.    Cardiovascular:  Negative for chest pain.   Gastrointestinal:  Negative for blood in stool, nausea and vomiting.   Genitourinary:  Negative for difficulty  urinating.   Musculoskeletal:  Positive for back pain. Negative for arthralgias, gait problem and neck pain.   Neurological:  Negative for dizziness, weakness and headaches.   Psychiatric/Behavioral:  Positive for sleep disturbance. Negative for behavioral problems.              Objective:      Physical Exam  Vitals reviewed.   Constitutional:       General: He is not in acute distress.     Appearance: He is well-developed.   HENT:      Head: Normocephalic and atraumatic.   Musculoskeletal:      Cervical back: Normal range of motion. No tenderness.      Comments: BUE:  ROM:   RUE: full.   LUE: full.  Strength:    RUE: 5/5 at shoulder abduction, 5 elbow flexion, 5 elbow extension, 5 hand .   LUE: 5/5 at shoulder abduction, 5 elbow flexion, 5 elbow extension, 5 hand .  Sensation to pinprick:   RUE: intact.   LUE: intact.  DTR:    RUE: +2 biceps, +2 triceps.   LUE:  +2 biceps, +2 triceps.  Gimenez:   RUE: -ve.   LUE: -ve.    BLE:  ROM:   RLE: full.   LLE: full.  Knee crepitus:   RLE: -ve.   LLE: -ve.   Strength:    RLE: 5/5 at hip flexion, 5 knee extension, 5 ankle DF, 5 PF, 5 EHL.   LLE: 5/5 at hip flexion, 5 knee extension, 5 ankle DF, 5 PF, 5 EHL.  Sensation to pinprick:     RLE: intact.      LLE: intact.   DTR:     RLE: +1 knee, +2 ankle.    LLE: +2 knee, +2 ankle.  Clonus:    Rt ankle: -ve.    Lt ankle: +ve few beats.  SLR:      RLE: +ve at 60 degrees.      LLE: +ve at 60 degrees.   INDY:     RLE: -ve.      LLE: -ve.  -ve tenderness over lumbar spine.  No leg length discrepancy.    Directional Preference:  Spine flexion: 70 degrees , mild pain in back.  Spine extension: 10 degrees, mod pain in back.  Lateral bending: no pain to Right, no pain to Left.      Heel walking: WNL.  Toe walking: WNL.  Gait: WNL     Skin:     General: Skin is warm.   Neurological:      Mental Status: He is alert.   Psychiatric:         Behavior: Behavior normal.       Assessment:       1. Chronic bilateral low back pain with  bilateral sciatica    2. Obesity (BMI 30.0-34.9)        Plan:         - X-Ray Lumbar Spine Ap Lateral w/Flex Ext; Future (to check for degenerative changes, alignment, instability)  - MRI Lumbar Spine Without Contrast; Future (due to prolonged low back pain in a young adult, in the presence of nerve root tension sign and some clonus on the left).  - Restart meloxicam (MOBIC) 15 MG tablet; Take 1 tablet (15 mg total) by mouth once daily.  - Start gabapentin (NEURONTIN) 300 MG capsule; Take 1 capsule (300 mg total) by mouth every evening. In 1 wk, if no relief, increase to twice daily.In another wk, may increase to 3 times.  - Start acetaminophen (TYLENOL) 500 MG tablet; Take 1-2 tablets (500-1,000 mg total) by mouth 3 (three) times daily as needed for Pain.  - The patient was encouraged to adopt a regular Home Exercise Program, and provided with printouts.  - Weight loss was encouraged.  - Follow up in about 4 months (around 6/26/2024).      This was a 45 minute visit  (including review of records), 50% of which was spent educating the patient about the diagnosis and the treatment plan.    This note was partly generated with [employer] voice recognition software. I apologize for any possible typographical errors.

## 2024-02-27 ENCOUNTER — PATIENT MESSAGE (OUTPATIENT)
Dept: INTERNAL MEDICINE | Facility: CLINIC | Age: 34
End: 2024-02-27
Payer: OTHER GOVERNMENT

## 2024-02-27 ENCOUNTER — LAB VISIT (OUTPATIENT)
Dept: LAB | Facility: HOSPITAL | Age: 34
End: 2024-02-27
Payer: OTHER GOVERNMENT

## 2024-02-27 DIAGNOSIS — R15.2 FECAL URGENCY: ICD-10-CM

## 2024-02-27 LAB — WBC #/AREA STL HPF: NORMAL /[HPF]

## 2024-02-27 PROCEDURE — 87427 SHIGA-LIKE TOXIN AG IA: CPT | Mod: 59

## 2024-02-27 PROCEDURE — 83993 ASSAY FOR CALPROTECTIN FECAL: CPT

## 2024-02-27 PROCEDURE — 87046 STOOL CULTR AEROBIC BACT EA: CPT

## 2024-02-27 PROCEDURE — 87209 SMEAR COMPLEX STAIN: CPT

## 2024-02-27 PROCEDURE — 87045 FECES CULTURE AEROBIC BACT: CPT

## 2024-02-27 PROCEDURE — 87338 HPYLORI STOOL AG IA: CPT

## 2024-02-27 PROCEDURE — 89055 LEUKOCYTE ASSESSMENT FECAL: CPT

## 2024-02-28 LAB
E COLI SXT1 STL QL IA: NEGATIVE
E COLI SXT2 STL QL IA: NEGATIVE

## 2024-02-29 ENCOUNTER — PATIENT MESSAGE (OUTPATIENT)
Dept: GASTROENTEROLOGY | Facility: CLINIC | Age: 34
End: 2024-02-29
Payer: OTHER GOVERNMENT

## 2024-03-01 LAB
BACTERIA STL CULT: NORMAL
CALPROTECTIN STL-MCNT: 12.2 MCG/G
O+P STL MICRO: NORMAL

## 2024-03-05 ENCOUNTER — HOSPITAL ENCOUNTER (OUTPATIENT)
Dept: RADIOLOGY | Facility: HOSPITAL | Age: 34
Discharge: HOME | End: 2024-03-05
Attending: PHYSICAL MEDICINE & REHABILITATION
Payer: OTHER GOVERNMENT

## 2024-03-05 DIAGNOSIS — M54.41 CHRONIC BILATERAL LOW BACK PAIN WITH BILATERAL SCIATICA: ICD-10-CM

## 2024-03-05 DIAGNOSIS — G89.29 CHRONIC BILATERAL LOW BACK PAIN WITH BILATERAL SCIATICA: ICD-10-CM

## 2024-03-05 DIAGNOSIS — M54.42 CHRONIC BILATERAL LOW BACK PAIN WITH BILATERAL SCIATICA: ICD-10-CM

## 2024-03-05 LAB — H PYLORI AG STL QL IA: NOT DETECTED

## 2024-03-05 PROCEDURE — 72148 MRI LUMBAR SPINE W/O DYE: CPT | Mod: 26,,, | Performed by: INTERNAL MEDICINE

## 2024-03-05 PROCEDURE — 72148 MRI LUMBAR SPINE W/O DYE: CPT | Mod: TC

## 2024-03-06 ENCOUNTER — PATIENT MESSAGE (OUTPATIENT)
Dept: INTERNAL MEDICINE | Facility: CLINIC | Age: 34
End: 2024-03-06
Payer: OTHER GOVERNMENT

## 2024-03-06 ENCOUNTER — TELEPHONE (OUTPATIENT)
Dept: GASTROENTEROLOGY | Facility: CLINIC | Age: 34
End: 2024-03-06
Payer: OTHER GOVERNMENT

## 2024-03-06 ENCOUNTER — PATIENT MESSAGE (OUTPATIENT)
Dept: PULMONOLOGY | Facility: CLINIC | Age: 34
End: 2024-03-06
Payer: OTHER GOVERNMENT

## 2024-03-06 DIAGNOSIS — R15.2 FECAL URGENCY: Primary | ICD-10-CM

## 2024-03-06 RX ORDER — DICYCLOMINE HYDROCHLORIDE 20 MG/1
20 TABLET ORAL 3 TIMES DAILY PRN
Qty: 90 TABLET | Refills: 1 | Status: SHIPPED | OUTPATIENT
Start: 2024-03-06 | End: 2024-06-04

## 2024-03-06 NOTE — TELEPHONE ENCOUNTER
----- Message from Shilpa Reyna NP sent at 3/6/2024  8:00 AM CST -----  Please let pt know stool studies are negative- no bacteria/virus/parasite or inflammation present to explain his symptoms. I have sent in bentyl for him to try, he can take daily or as needed- should help reduce urgency of BM's. Also please have him start a fiber supplement daily- such as benefiber or citrucel.

## 2024-03-07 ENCOUNTER — PATIENT MESSAGE (OUTPATIENT)
Dept: INTERNAL MEDICINE | Facility: CLINIC | Age: 34
End: 2024-03-07

## 2024-03-07 ENCOUNTER — OFFICE VISIT (OUTPATIENT)
Dept: INTERNAL MEDICINE | Facility: CLINIC | Age: 34
End: 2024-03-07
Payer: OTHER GOVERNMENT

## 2024-03-07 ENCOUNTER — PATIENT OUTREACH (OUTPATIENT)
Dept: ADMINISTRATIVE | Facility: HOSPITAL | Age: 34
End: 2024-03-07
Payer: OTHER GOVERNMENT

## 2024-03-07 DIAGNOSIS — L73.1 PSEUDOFOLLICULITIS BARBAE: Primary | ICD-10-CM

## 2024-03-07 PROCEDURE — 99213 OFFICE O/P EST LOW 20 MIN: CPT | Mod: 95,,, | Performed by: FAMILY MEDICINE

## 2024-03-07 PROCEDURE — 95810 POLYSOM 6/> YRS 4/> PARAM: CPT | Mod: 26,,, | Performed by: INTERNAL MEDICINE

## 2024-03-07 NOTE — LETTER
"  [facility]  [address]  [facility] [address]  Phone: [phone]  Fax: [phone]       2024    MEMORANDUM FOR:  RECORD  FROM: Primary Care Manager for Pradeep Ro ( 1990)    SUBJECT: Medical Recommendation for Shaving Exemption      1.  This memorandum is being written by the above named member's Primary Care Manager (PCM), proving a medical recommendation on an exemption from [employer] shaving requirements.    2.  I have been the PCM for this member since his visit to establish care with me on 2024. I have reviewed the entirety of his available medical record and have interviewed the patient personally regarding these matters.    3.  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 This condition risks causing severe skin irritation as a result of frequent shaving. This irritation can lead to pain and in some cases infection of the effected area.    4.  It is my medical recommendation that the member be authorized an exemption from shaving IAW -9 or the most current [employer] or Department of [employer] regulations with the following requirements:       (a)  Facial hair will not exceed 1/4" in length at any time       (b)  Facial hair will not be styled or shaped into a goatee       (c)  Facial hair will remain neatly trimmed and clean at all times, while maintaining a professional  image       (d)  This recommendation may be superseded by Commander's prerogative or when hygiene or safety are legitimately compromised. Requirements for specific respiratory gear may also override this recommendation.    5.  This exemption should be maintained for the maximum duration allowable by the current regulations. Renewal of the exemption IAW these regulation should be at the direction of a medical professional.    6.  If there are any questions regarding this memorandum and its contents, direct them to " my at the [facility] via email at [email or web address] or by COMM at [phone].            MARIO FLORENCE MD, FAAFP  Family Medicine Physician  [facility]  [facility]

## 2024-03-07 NOTE — PROGRESS NOTES
Subjective:     Patient ID: Pradeep Ro is a 33 y.o. male.   Chief Complaint: No chief complaint on file.    HPI:  The patient location is: [place]  The chief complaint leading to consultation is: request for waiver    Visit type: audiovisual    Face to Face time with patient: 10  22 minutes of total time spent on the encounter, which includes face to face time and non-face to face time preparing to see the patient (eg, review of tests), Obtaining and/or reviewing separately obtained history, Documenting clinical information in the electronic or other health record, Independently interpreting results (not separately reported) and communicating results to the patient/family/caregiver, or Care coordination (not separately reported).         Each patient to whom he or she provides medical services by telemedicine is:  (1) informed of the relationship between the physician and patient and the respective role of any other health care provider with respect to management of the patient; and (2) notified that he or she may decline to receive medical services by telemedicine and may withdraw from such care at any time.    Notes:      Pt requesting shaving waiver for  [employer] and for his place of employment    Review of Systems   Constitutional:  Negative for activity change and unexpected weight change.   HENT:  Negative for hearing loss, rhinorrhea and trouble swallowing.    Eyes:  Negative for discharge and visual disturbance.   Respiratory:  Negative for chest tightness and wheezing.    Cardiovascular:  Negative for chest pain and palpitations.   Gastrointestinal:  Negative for blood in stool, constipation, diarrhea and vomiting.   Endocrine: Negative for polydipsia and polyuria.   Genitourinary:  Negative for difficulty urinating, hematuria and urgency.   Musculoskeletal:  Negative for arthralgias, joint swelling and neck pain.   Neurological:  Negative for weakness and headaches.   Psychiatric/Behavioral:   Negative for confusion and dysphoric mood.           Objective:      There were no vitals filed for this visit.   Physical Exam  Constitutional:       General: He is not in acute distress.     Appearance: Normal appearance. He is not ill-appearing.   HENT:      Head: Normocephalic.   Skin:     General: Skin is warm.      Findings: Erythema and rash present.      Comments: Skin on neck with erythematous irritation exclusively associated with hair follicles   Neurological:      General: No focal deficit present.      Mental Status: He is alert and oriented to person, place, and time. Mental status is at baseline.   Psychiatric:         Mood and Affect: Mood normal.         Behavior: Behavior normal.         Thought Content: Thought content normal.         Judgment: Judgment normal.           Assessment:       Problem List Items Addressed This Visit    None  Visit Diagnoses       Pseudofolliculitis barbae    -  Primary              Plan:       1. Pseudofolliculitis barbae  Exam c/w PFB  Pt will submit form for completion for his employer  Will also need second memorandum written for his [employer]          No follow-ups on file.     Mauro Gilmore MD, FAAFP  Family Medicine Physician  [facility]  03/07/2024

## 2024-03-07 NOTE — PROGRESS NOTES
There are no preventive care reminders to display for this patient.    Triggered LINKS and reconciled immunizations. Updated Care Everywhere. Scanned [facility] Exemption form into media. Chart review completed.

## 2024-03-13 NOTE — TELEPHONE ENCOUNTER
Pt stating you was supposed to do another Shaving waiver for the . 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    Please review and respond,  Thank You.

## 2024-03-19 ENCOUNTER — PATIENT MESSAGE (OUTPATIENT)
Dept: PULMONOLOGY | Facility: CLINIC | Age: 34
End: 2024-03-19
Payer: OTHER GOVERNMENT

## 2024-03-19 ENCOUNTER — PATIENT MESSAGE (OUTPATIENT)
Dept: PHYSICAL MEDICINE AND REHAB | Facility: CLINIC | Age: 34
End: 2024-03-19
Payer: OTHER GOVERNMENT

## 2024-03-19 DIAGNOSIS — G89.29 CHRONIC BILATERAL LOW BACK PAIN WITH BILATERAL SCIATICA: Primary | ICD-10-CM

## 2024-03-19 DIAGNOSIS — G47.33 OSA (OBSTRUCTIVE SLEEP APNEA): Primary | ICD-10-CM

## 2024-03-19 DIAGNOSIS — M54.42 CHRONIC BILATERAL LOW BACK PAIN WITH BILATERAL SCIATICA: Primary | ICD-10-CM

## 2024-03-19 DIAGNOSIS — M54.41 CHRONIC BILATERAL LOW BACK PAIN WITH BILATERAL SCIATICA: Primary | ICD-10-CM

## 2024-03-19 DIAGNOSIS — M47.26 OSTEOARTHRITIS OF SPINE WITH RADICULOPATHY, LUMBAR REGION: ICD-10-CM

## 2024-04-11 ENCOUNTER — PATIENT MESSAGE (OUTPATIENT)
Dept: PULMONOLOGY | Facility: CLINIC | Age: 34
End: 2024-04-11
Payer: OTHER GOVERNMENT

## 2024-04-12 ENCOUNTER — TELEPHONE (OUTPATIENT)
Dept: SLEEP MEDICINE | Facility: HOSPITAL | Age: 34
End: 2024-04-12
Payer: OTHER GOVERNMENT

## 2024-04-12 ENCOUNTER — PATIENT MESSAGE (OUTPATIENT)
Dept: INTERNAL MEDICINE | Facility: CLINIC | Age: 34
End: 2024-04-12
Payer: OTHER GOVERNMENT

## 2024-04-12 DIAGNOSIS — L73.1 PSEUDOFOLLICULITIS BARBAE: Primary | ICD-10-CM

## 2024-04-16 ENCOUNTER — PATIENT MESSAGE (OUTPATIENT)
Dept: PULMONOLOGY | Facility: CLINIC | Age: 34
End: 2024-04-16
Payer: OTHER GOVERNMENT

## 2024-05-13 ENCOUNTER — PATIENT MESSAGE (OUTPATIENT)
Dept: PULMONOLOGY | Facility: CLINIC | Age: 34
End: 2024-05-13
Payer: OTHER GOVERNMENT

## 2024-06-10 ENCOUNTER — PATIENT MESSAGE (OUTPATIENT)
Dept: INTERNAL MEDICINE | Facility: CLINIC | Age: 34
End: 2024-06-10
Payer: OTHER GOVERNMENT

## 2024-06-19 ENCOUNTER — TELEPHONE (OUTPATIENT)
Dept: PHYSICAL MEDICINE AND REHAB | Facility: CLINIC | Age: 34
End: 2024-06-19
Payer: OTHER GOVERNMENT

## 2024-07-08 ENCOUNTER — TELEPHONE (OUTPATIENT)
Dept: PULMONOLOGY | Facility: CLINIC | Age: 34
End: 2024-07-08
Payer: OTHER GOVERNMENT

## 2024-07-08 ENCOUNTER — PATIENT MESSAGE (OUTPATIENT)
Dept: PULMONOLOGY | Facility: CLINIC | Age: 34
End: 2024-07-08
Payer: OTHER GOVERNMENT

## 2024-07-08 NOTE — TELEPHONE ENCOUNTER
Faxed to number provided.              ----- Message from Ricardo Oneil MA sent at 7/8/2024  7:34 AM CDT -----  Kya Cross MA P [facility] Pulmonary/ Sleep Medicine Clinical Support Staff  Caller: Unspecified (3 days ago,  8:10 AM)       Previous Messages       ----- Message -----  From: Saige Chavez  Sent: 7/5/2024   8:14 AM CDT  To: [facility] Staff  Subject: self [phone]                                .Type: Patient Call Back    Who called: self    What is the request in detail: patient requesting to faxed sleep results to [phone] [facility]. [employer] claim# [id number]    Can the clinic reply by [facility]?no    Would the patient rather a call back or a response via [facility]?call back    Best call back number [phone]

## 2024-07-09 ENCOUNTER — TELEPHONE (OUTPATIENT)
Dept: PULMONOLOGY | Facility: CLINIC | Age: 34
End: 2024-07-09
Payer: OTHER GOVERNMENT

## 2024-07-09 NOTE — TELEPHONE ENCOUNTER
I called Mr Ro and told him I have a copy of his sleep study. He asked me to fax it to the [employer] [phone] and please send to his email [email or web address]. I did both. Angelita Hoffman LPN

## 2024-10-04 ENCOUNTER — HOSPITAL ENCOUNTER (EMERGENCY)
Facility: HOSPITAL | Age: 34
Discharge: HOME | End: 2024-10-04
Attending: EMERGENCY MEDICINE
Payer: OTHER GOVERNMENT

## 2024-10-04 VITALS
WEIGHT: 210 LBS | HEIGHT: 69 IN | SYSTOLIC BLOOD PRESSURE: 164 MMHG | RESPIRATION RATE: 16 BRPM | TEMPERATURE: 98 F | HEART RATE: 73 BPM | OXYGEN SATURATION: 100 % | BODY MASS INDEX: 31.1 KG/M2 | DIASTOLIC BLOOD PRESSURE: 98 MMHG

## 2024-10-04 DIAGNOSIS — R10.9 RIGHT FLANK PAIN: ICD-10-CM

## 2024-10-04 DIAGNOSIS — M54.9 RIGHT-SIDED BACK PAIN: Primary | ICD-10-CM

## 2024-10-04 LAB
ALBUMIN SERPL BCP-MCNC: 4.2 G/DL (ref 3.5–5.2)
ALP SERPL-CCNC: 56 U/L (ref 55–135)
ALT SERPL W/O P-5'-P-CCNC: 21 U/L (ref 10–44)
ANION GAP SERPL CALC-SCNC: 7 MMOL/L (ref 8–16)
AST SERPL-CCNC: 20 U/L (ref 10–40)
BASOPHILS # BLD AUTO: 0.02 K/UL (ref 0–0.2)
BASOPHILS NFR BLD: 0.5 % (ref 0–1.9)
BILIRUB SERPL-MCNC: 0.4 MG/DL (ref 0.1–1)
BILIRUB UR QL STRIP: NEGATIVE
BUN SERPL-MCNC: 10 MG/DL (ref 6–20)
CALCIUM SERPL-MCNC: 9.1 MG/DL (ref 8.7–10.5)
CHLORIDE SERPL-SCNC: 104 MMOL/L (ref 95–110)
CLARITY UR: CLEAR
CO2 SERPL-SCNC: 29 MMOL/L (ref 23–29)
COLOR UR: YELLOW
CREAT SERPL-MCNC: 1.1 MG/DL (ref 0.5–1.4)
DIFFERENTIAL METHOD BLD: ABNORMAL
EOSINOPHIL # BLD AUTO: 0.1 K/UL (ref 0–0.5)
EOSINOPHIL NFR BLD: 2.7 % (ref 0–8)
ERYTHROCYTE [DISTWIDTH] IN BLOOD BY AUTOMATED COUNT: 13 % (ref 11.5–14.5)
EST. GFR  (NO RACE VARIABLE): >60 ML/MIN/1.73 M^2
GLUCOSE SERPL-MCNC: 82 MG/DL (ref 70–110)
GLUCOSE UR QL STRIP: NEGATIVE
HCT VFR BLD AUTO: 48.3 % (ref 40–54)
HGB BLD-MCNC: 15.8 G/DL (ref 14–18)
HGB UR QL STRIP: NEGATIVE
IMM GRANULOCYTES # BLD AUTO: 0.01 K/UL (ref 0–0.04)
IMM GRANULOCYTES NFR BLD AUTO: 0.3 % (ref 0–0.5)
KETONES UR QL STRIP: NEGATIVE
LEUKOCYTE ESTERASE UR QL STRIP: NEGATIVE
LYMPHOCYTES # BLD AUTO: 1.7 K/UL (ref 1–4.8)
LYMPHOCYTES NFR BLD: 44.5 % (ref 18–48)
MCH RBC QN AUTO: 29.8 PG (ref 27–31)
MCHC RBC AUTO-ENTMCNC: 32.7 G/DL (ref 32–36)
MCV RBC AUTO: 91 FL (ref 82–98)
MONOCYTES # BLD AUTO: 0.2 K/UL (ref 0.3–1)
MONOCYTES NFR BLD: 4.8 % (ref 4–15)
NEUTROPHILS # BLD AUTO: 1.8 K/UL (ref 1.8–7.7)
NEUTROPHILS NFR BLD: 47.2 % (ref 38–73)
NITRITE UR QL STRIP: NEGATIVE
NRBC BLD-RTO: 0 /100 WBC
PH UR STRIP: 6 [PH] (ref 5–8)
PLATELET # BLD AUTO: 277 K/UL (ref 150–450)
PMV BLD AUTO: 8.8 FL (ref 9.2–12.9)
POTASSIUM SERPL-SCNC: 3.8 MMOL/L (ref 3.5–5.1)
PROT SERPL-MCNC: 7.7 G/DL (ref 6–8.4)
PROT UR QL STRIP: ABNORMAL
RBC # BLD AUTO: 5.31 M/UL (ref 4.6–6.2)
SODIUM SERPL-SCNC: 140 MMOL/L (ref 136–145)
SP GR UR STRIP: >1.03 (ref 1–1.03)
URN SPEC COLLECT METH UR: ABNORMAL
UROBILINOGEN UR STRIP-ACNC: NEGATIVE EU/DL
WBC # BLD AUTO: 3.73 K/UL (ref 3.9–12.7)

## 2024-10-04 PROCEDURE — 25000003 PHARM REV CODE 250: Performed by: NURSE PRACTITIONER

## 2024-10-04 PROCEDURE — 63600175 PHARM REV CODE 636 W HCPCS: Performed by: NURSE PRACTITIONER

## 2024-10-04 PROCEDURE — 99285 EMERGENCY DEPT VISIT HI MDM: CPT | Mod: 25

## 2024-10-04 PROCEDURE — 81003 URINALYSIS AUTO W/O SCOPE: CPT | Performed by: EMERGENCY MEDICINE

## 2024-10-04 PROCEDURE — 80053 COMPREHEN METABOLIC PANEL: CPT | Performed by: NURSE PRACTITIONER

## 2024-10-04 PROCEDURE — 96374 THER/PROPH/DIAG INJ IV PUSH: CPT

## 2024-10-04 PROCEDURE — 85025 COMPLETE CBC W/AUTO DIFF WBC: CPT | Performed by: NURSE PRACTITIONER

## 2024-10-04 RX ORDER — ORPHENADRINE CITRATE 100 MG/1
100 TABLET, EXTENDED RELEASE ORAL 2 TIMES DAILY
Status: DISCONTINUED | OUTPATIENT
Start: 2024-10-04 | End: 2024-10-04 | Stop reason: HOSPADM

## 2024-10-04 RX ORDER — MORPHINE SULFATE 4 MG/ML
4 INJECTION, SOLUTION INTRAMUSCULAR; INTRAVENOUS
Status: COMPLETED | OUTPATIENT
Start: 2024-10-04 | End: 2024-10-04

## 2024-10-04 RX ORDER — ORPHENADRINE CITRATE 100 MG/1
100 TABLET, EXTENDED RELEASE ORAL EVERY 12 HOURS PRN
Qty: 12 TABLET | Refills: 0 | Status: SHIPPED | OUTPATIENT
Start: 2024-10-04

## 2024-10-04 RX ADMIN — ORPHENADRINE CITRATE 100 MG: 100 TABLET, EXTENDED RELEASE ORAL at 11:10

## 2024-10-04 RX ADMIN — MORPHINE SULFATE 4 MG: 4 INJECTION, SOLUTION INTRAMUSCULAR; INTRAVENOUS at 10:10

## 2024-10-04 NOTE — Clinical Note
"Pradeep Sorialissett Ro was seen and treated in our emergency department on 10/4/2024.  He may return to work on 10/05/2024.       If you have any questions or concerns, please don't hesitate to call.      Daniel Decker, FNP"

## 2024-10-04 NOTE — DISCHARGE INSTRUCTIONS
§ Please return to the Emergency Department for any new or worsening symptoms including: fever, chest pain, shortness of breath, loss of consciousness, dizziness, weakness, changes in the location or type of pain or any other concerns.     § Schedule an appointment for follow up with your Primary Care Doctor as soon as possible for a recheck of your symptoms. If you do not have one, contact the one listed on your discharge paperwork or call the [facility] Appointment Desk at [phone] to schedule an appointment.     § Please take all medication as prescribed. Take your Mobic as directed for the next 5 days.     You have been prescribed Norflex (Orphenadrine) for muscle spasms/pain. Please do not take this medication while working, drinking alcohol, swimming, or while driving/operating heavy machinery. This medication may cause drowsiness, dizziness, impair judgment, and reduce physical capabilities.You should not drive, operate heavy machinery, or make life changing decisions while taking this medication.

## 2024-10-04 NOTE — ED PROVIDER NOTES
"Encounter Date: 10/4/2024    SCRIBE #1 NOTE: I, Bessy Bush, am scribing for, and in the presence of,  NILSA Smalls. I have scribed the following portions of the note - Other sections scribed: HPI, ROS.       History     Chief Complaint   Patient presents with    Flank Pain     Pt arrived to ED with complaints of pain to his right flank area      CC: Flank Pain    HPI: Pradeep Ro, a 34 y.o. male presents to the ED for evaluation of right flank pain, symptoms onset at 6 am this morning. States his flank pain is non-radiating and not worsened with movement. States he takes Meloxicam for chronic lower back pain. 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 Additional history obtained from independent historian: patient's wife states that the patient's flank pain comes in waves and that he was in severe pain this morning, "he was laid against the wall screaming." No other alleviating or exacerbating factors. Denies history of kidney stones. Denies tobacco use. Denies chest pain, SOB, leg swelling, abdominal pain, dysuria, frequency, or other associated symptoms. Patient attempted Tylenol 1,000 mg and Meloxicam at 6:25 am this morning as treatment/medication with slight relief. This is the extent of the patient's complaints in the ED. NKDA.       Patient Active Problem List:     Hyperlipidemia, unspecified     Low back pain, unspecified     JUAN RAMON (obstructive sleep apnea)    The history is provided by the patient and the spouse. No  was used.     Review of patient's allergies indicates:  No Known Allergies  Past Medical History:   Diagnosis Date    Hyperlipidemia, unspecified 02/21/2024    JUAN RAMON (obstructive sleep apnea) 02/24/2024     History reviewed. No pertinent surgical history.  Family History   Problem Relation Name Age of Onset    Heart disease Father      Hyperlipidemia Father      Heart disease Paternal Grandfather      Hyperlipidemia Paternal Grandfather       Social History     Tobacco " Use    Smoking status: Never    Smokeless tobacco: Never   Substance Use Topics    Alcohol use: Yes     Alcohol/week: 6.0 standard drinks of alcohol     Types: 3 Shots of liquor, 3 Drinks containing 0.5 oz of alcohol per week    Drug use: No     Review of Systems   Constitutional:  Negative for chills and fever.   HENT:  Negative for congestion, ear discharge, ear pain, rhinorrhea, sore throat and trouble swallowing.    Eyes:  Negative for visual disturbance.   Respiratory:  Negative for cough and shortness of breath.    Cardiovascular:  Negative for chest pain, palpitations and leg swelling.   Gastrointestinal:  Negative for abdominal pain, diarrhea, nausea and vomiting.   Genitourinary:  Positive for flank pain (right). Negative for dysuria and frequency.   Musculoskeletal:  Negative for back pain, neck pain and neck stiffness.   Skin:  Negative for color change, rash and wound.   Neurological:  Negative for seizures, syncope, speech difficulty, weakness and headaches.   Psychiatric/Behavioral:  Negative for confusion.        Physical Exam     Initial Vitals [10/04/24 0949]   BP Pulse Resp Temp SpO2   (!) 168/77 73 18 98.4 °F (36.9 °C) 100 %      MAP       --         Physical Exam    Nursing note and vitals reviewed.  Constitutional: He appears well-developed and well-nourished. He is not diaphoretic. He is cooperative.  Non-toxic appearance. He does not have a sickly appearance. He does not appear ill. No distress.   HENT:   Head: Normocephalic and atraumatic.   Right Ear: External ear normal.   Left Ear: External ear normal.   Nose: Nose normal. Mouth/Throat: Oropharynx is clear and moist and mucous membranes are normal. No trismus in the jaw.   Eyes: Conjunctivae and EOM are normal. No scleral icterus.   Neck: Phonation normal.   Normal range of motion.  Cardiovascular:  Normal rate, regular rhythm and intact distal pulses.           Pulses:       Radial pulses are 2+ on the right side and 2+ on the left side.    No lower extremity swelling or edema.  Calfs are soft without tenderness.   Pulmonary/Chest: No tachypnea and no bradypnea. No respiratory distress. He has no wheezes. He has no rhonchi. He has no rales.   Abdominal: Abdomen is soft and flat. He exhibits no distension. There is no abdominal tenderness.   No right CVA tenderness.  No left CVA tenderness. There is no rebound and no guarding.   Musculoskeletal:         General: Normal range of motion.      Cervical back: Normal range of motion. No rigidity. Normal range of motion.        Back:      Neurological: He is alert and oriented to person, place, and time. No sensory deficit. He exhibits normal muscle tone. Coordination and gait normal. GCS score is 15. GCS eye subscore is 4. GCS verbal subscore is 5. GCS motor subscore is 6.   Skin: Skin is warm and dry. Capillary refill takes less than 2 seconds. No bruising and no rash noted. No erythema.   Psychiatric: He has a normal mood and affect. His behavior is normal. Judgment and thought content normal.         ED Course   Procedures  Labs Reviewed   URINALYSIS, REFLEX TO URINE CULTURE - Abnormal       Result Value    Specimen UA Urine, Clean Catch      Color, UA Yellow      Appearance, UA Clear      pH, UA 6.0      Specific Gravity, UA >1.030 (*)     Protein, UA Trace (*)     Glucose, UA Negative      Ketones, UA Negative      Bilirubin (UA) Negative      Occult Blood UA Negative      Nitrite, UA Negative      Urobilinogen, UA Negative      Leukocytes, UA Negative      Narrative:     Specimen Source->Urine   CBC W/ AUTO DIFFERENTIAL - Abnormal    WBC 3.73 (*)     RBC 5.31      Hemoglobin 15.8      Hematocrit 48.3      MCV 91      MCH 29.8      MCHC 32.7      RDW 13.0      Platelets 277      MPV 8.8 (*)     Immature Granulocytes 0.3      Gran # (ANC) 1.8      Immature Grans (Abs) 0.01      Lymph # 1.7      Mono # 0.2 (*)     Eos # 0.1      Baso # 0.02      nRBC 0      Gran % 47.2      Lymph % 44.5      Mono % 4.8       Eosinophil % 2.7      Basophil % 0.5      Differential Method Automated     COMPREHENSIVE METABOLIC PANEL - Abnormal    Sodium 140      Potassium 3.8      Chloride 104      CO2 29      Glucose 82      BUN 10      Creatinine 1.1      Calcium 9.1      Total Protein 7.7      Albumin 4.2      Total Bilirubin 0.4      Alkaline Phosphatase 56      AST 20      ALT 21      eGFR >60      Anion Gap 7 (*)           Imaging Results              X-Ray Chest PA And Lateral (Final result)  Result time 10/04/24 11:27:05      Final result by Oliver Hinton DO (10/04/24 11:27:05)                   Impression:      Please see above      Electronically signed by: Oliver Hinton DO  Date:    10/04/2024  Time:    11:27               Narrative:    EXAMINATION:  XR CHEST PA AND LATERAL    CLINICAL HISTORY:  Dorsalgia, unspecified    TECHNIQUE:  PA and lateral views of the chest were performed.    COMPARISON:  02/06/2021    FINDINGS:  No significant change from prior.  No lung consolidation.  No pleural effusion or pneumothorax.  Heart size stable within normal limits.  Visualized osseous structures grossly intact.                                       CT Renal Stone Study ABD Pelvis WO (Final result)  Result time 10/04/24 11:16:03      Final result by Daniel Haines MD (10/04/24 11:16:03)                   Impression:      No renal or ureteral calculi are identified.    Tiny fat containing umbilical hernia.      Electronically signed by: Daniel Haines MD  Date:    10/04/2024  Time:    11:16               Narrative:    EXAMINATION:  CT RENAL STONE STUDY ABD PELVIS WO    CLINICAL HISTORY:  Flank pain, kidney stone suspected;    TECHNIQUE:  Low dose axial images, sagittal and coronal reformations were obtained from the lung bases to the pubic symphysis.  Contrast was not administered.    COMPARISON:  None    FINDINGS:  The lung bases are clear.  No pleural effusions are identified.  Bony structures appear intact.  There is no  evidence for acute fracture or bone destruction.    The liver is normal in size and is homogeneous in density with no focal liver lesions identified.  The gallbladder is present and appears grossly unremarkable.  No biliary dilatation is appreciated on this noncontrast examination.  The spleen, stomach, and pancreas all appear grossly unremarkable.  The adrenal glands are not enlarged.  The kidneys are normal in size, position, and contour.  There is no evidence for abnormal renal masses or hydronephrosis.  No renal or ureteral calculi are identified.  The abdominal aorta tapers normally without aneurysmal dilatation.  There is a retroaortic left renal vein as an anatomic variant.  No para-aortic lymphadenopathy is identified.  The appendix is present and appears unremarkable.  No dilated loops of bowel are evident.  The urinary bladder is decompressed.  There is no evidence for pelvic or inguinal lymphadenopathy.  No ascites is identified.  There is a tiny fat containing umbilical hernia.                                       Medications   orphenadrine 12 hr tablet 100 mg (100 mg Oral Given 10/4/24 1150)   morphine injection 4 mg (4 mg Intravenous Given 10/4/24 1029)     Medical Decision Making  Amount and/or Complexity of Data Reviewed  Independent Historian: spouse     Details: See HPI.   Labs: ordered. Decision-making details documented in ED Course.  Radiology: ordered. Decision-making details documented in ED Course.    Risk  Prescription drug management.      Additional MDM:   PERC Rule:   Age is greater than or equal to 50 = 0.0  Heart Rate is greater than or equal to 100 = 0.0  SaO2 on room air < 95% = 0.0  Unilateral leg swelling = 0.0  Hemoptysis = 0.0  Recent surgery or trauma = 0.0  Prior PE or DVT =  0.0  Hormone use = 0.00  PERC Score = 0        APC / Resident Notes:   This is an evaluation of a 34 y.o. male that presents to the Emergency Department for right back/flank pain does not acute onset  earlier this morning.  Patient reports pain continued, worse with movement worse with taking a deep breath.  Patient's wife reports he appears to be more comfortable now than he was when the pain 1st started as she needed to assist him getting ready to come to the ED today.  Physical Exam shows a non-toxic, afebrile, and well appearing male.  There is an area of tenderness to palpation in the right back/flank area at the lower thorax and upper lumbar level.  There is no erythema, wounds, increased warmth.  No crepitus.  No CVA tenderness.  Abdomen is soft and nontender.  There is no lower extremity swelling or edema.  Calfs are soft without pain. Vital signs are reassuring. If available, previous records reviewed. RESULTS:  CBC with mild leukopenia 3.73, normal H&H.  CMP with an anion gap of 7, otherwise unremarkable.  Urinalysis with trace protein without infection or blood.  CT renal stone with no calculi identified.  Fat containing umbilical hernia.  Perc score 0.    My overall impression is right-sided back/flank pain of uncertain etiology, given the small size and point tenderness, suspect potential musculoskeletal. I considered, but at this time, do not suspect pneumonia, pneumothorax, pulmonary embolism, renal stone, ureterolithiasis, UTI, pyelonephritis.    Discharge Meds/Instructions:  Norflex, continue home Mobic as directed daily for the next 5 days. The diagnosis, treatment plan, instructions for follow-up as well as ED return precautions were discussed. All questions have been answered. JOY Moreno, LOLISP-C     Scribe Attestation:   Scribe #1: I performed the above scribed service and the documentation accurately describes the services I performed. I attest to the accuracy of the note.        ED Course as of 10/04/24 1238   Fri Oct 04, 2024   1142 Reassessment: Patient reports pain improved. Discussed ED workup and plan for follow up with return precautions.  [AF]      ED Course User Index  [AF]  Daniel Decker FNP                           Clinical Impression:  Final diagnoses:  [M54.9] Right-sided back pain (Primary)  [R10.9] Right flank pain          ED Disposition Condition    Discharge Stable          LISA COLIN APRN, NILSA-C, personally performed the services described in this documentation. All medical record entries made by the scribe were at my direction and in my presence. I have reviewed the chart and agree that the record reflects my personal performance and is accurate and complete.      DISCLAIMER: This note was prepared with [employer] voice recognition transcription software. Garbled syntax, mangled pronouns, and other bizarre constructions may be attributed to that software system.  ED Prescriptions       Medication Sig Dispense Start Date End Date Auth. Provider    orphenadrine (NORFLEX) 100 mg tablet Take 1 tablet (100 mg total) by mouth every 12 (twelve) hours as needed. 12 tablet 10/4/2024 -- Daniel Decker FNP          Follow-up Information       Follow up With Specialties Details Why Contact Info    Your Primary Care Doctor  Schedule an appointment as soon as possible for a visit  Please call and schedule an appointment for follow up this week.     [facility] Emergency Dept Emergency Medicine Go to  If symptoms worsen [address] [facility] [place] [address] [phone]             Daniel Decker FNP  10/04/24 9569

## 2024-10-04 NOTE — ED TRIAGE NOTES
Pain to right side of back that started this morning. Denies injury/trauma to that area. Denies problem with bowel/bladder function.

## 2024-10-05 ENCOUNTER — HOSPITAL ENCOUNTER (INPATIENT)
Facility: HOSPITAL | Age: 34
LOS: 1 days | Discharge: HOME | DRG: 101 | End: 2024-10-08
Attending: STUDENT IN AN ORGANIZED HEALTH CARE EDUCATION/TRAINING PROGRAM | Admitting: STUDENT IN AN ORGANIZED HEALTH CARE EDUCATION/TRAINING PROGRAM
Payer: OTHER GOVERNMENT

## 2024-10-05 DIAGNOSIS — M25.559 HIP PAIN: ICD-10-CM

## 2024-10-05 DIAGNOSIS — R40.20 LOSS OF CONSCIOUSNESS: ICD-10-CM

## 2024-10-05 DIAGNOSIS — R07.9 CHEST PAIN: ICD-10-CM

## 2024-10-05 DIAGNOSIS — T14.90XA TRAUMA: ICD-10-CM

## 2024-10-05 DIAGNOSIS — R40.20 LOC (LOSS OF CONSCIOUSNESS): Primary | ICD-10-CM

## 2024-10-05 DIAGNOSIS — R79.89 ELEVATED BRAIN NATRIURETIC PEPTIDE (BNP) LEVEL: ICD-10-CM

## 2024-10-05 DIAGNOSIS — M25.519 SHOULDER PAIN: ICD-10-CM

## 2024-10-05 DIAGNOSIS — I21.4 NSTEMI (NON-ST ELEVATED MYOCARDIAL INFARCTION): ICD-10-CM

## 2024-10-05 DIAGNOSIS — I10 PRIMARY HYPERTENSION: ICD-10-CM

## 2024-10-05 LAB
ALBUMIN SERPL BCP-MCNC: 3.4 G/DL (ref 3.5–5.2)
ALP SERPL-CCNC: 41 U/L (ref 55–135)
ALT SERPL W/O P-5'-P-CCNC: 20 U/L (ref 10–44)
AMPHET+METHAMPHET UR QL: NEGATIVE
ANION GAP SERPL CALC-SCNC: 10 MMOL/L (ref 8–16)
AST SERPL-CCNC: 18 U/L (ref 10–40)
BARBITURATES UR QL SCN>200 NG/ML: NEGATIVE
BASOPHILS # BLD AUTO: 0.02 K/UL (ref 0–0.2)
BASOPHILS NFR BLD: 0.1 % (ref 0–1.9)
BENZODIAZ UR QL SCN>200 NG/ML: NEGATIVE
BILIRUB SERPL-MCNC: 0.3 MG/DL (ref 0.1–1)
BUN SERPL-MCNC: 10 MG/DL (ref 6–20)
BZE UR QL SCN: NEGATIVE
CALCIUM SERPL-MCNC: 7.4 MG/DL (ref 8.7–10.5)
CANNABINOIDS UR QL SCN: NEGATIVE
CHLORIDE SERPL-SCNC: 112 MMOL/L (ref 95–110)
CK SERPL-CCNC: 362 U/L (ref 20–200)
CO2 SERPL-SCNC: 20 MMOL/L (ref 23–29)
CREAT SERPL-MCNC: 1.1 MG/DL (ref 0.5–1.4)
CREAT UR-MCNC: 66 MG/DL (ref 23–375)
DIFFERENTIAL METHOD BLD: ABNORMAL
EOSINOPHIL # BLD AUTO: 0 K/UL (ref 0–0.5)
EOSINOPHIL NFR BLD: 0.1 % (ref 0–8)
ERYTHROCYTE [DISTWIDTH] IN BLOOD BY AUTOMATED COUNT: 13.1 % (ref 11.5–14.5)
EST. GFR  (NO RACE VARIABLE): >60 ML/MIN/1.73 M^2
ETHANOL SERPL-MCNC: <10 MG/DL
GLUCOSE SERPL-MCNC: 99 MG/DL (ref 70–110)
HCT VFR BLD AUTO: 42.7 % (ref 40–54)
HGB BLD-MCNC: 14.4 G/DL (ref 14–18)
IMM GRANULOCYTES # BLD AUTO: 0.05 K/UL (ref 0–0.04)
IMM GRANULOCYTES NFR BLD AUTO: 0.4 % (ref 0–0.5)
LACTATE SERPL-SCNC: 1.9 MMOL/L (ref 0.5–2.2)
LYMPHOCYTES # BLD AUTO: 1 K/UL (ref 1–4.8)
LYMPHOCYTES NFR BLD: 7.5 % (ref 18–48)
MCH RBC QN AUTO: 30.3 PG (ref 27–31)
MCHC RBC AUTO-ENTMCNC: 33.7 G/DL (ref 32–36)
MCV RBC AUTO: 90 FL (ref 82–98)
METHADONE UR QL SCN>300 NG/ML: NEGATIVE
MONOCYTES # BLD AUTO: 0.8 K/UL (ref 0.3–1)
MONOCYTES NFR BLD: 5.8 % (ref 4–15)
NEUTROPHILS # BLD AUTO: 11.8 K/UL (ref 1.8–7.7)
NEUTROPHILS NFR BLD: 86.1 % (ref 38–73)
NRBC BLD-RTO: 0 /100 WBC
OPIATES UR QL SCN: NEGATIVE
PCP UR QL SCN>25 NG/ML: NEGATIVE
PLATELET # BLD AUTO: 230 K/UL (ref 150–450)
PMV BLD AUTO: 8.7 FL (ref 9.2–12.9)
POTASSIUM SERPL-SCNC: 3 MMOL/L (ref 3.5–5.1)
PROT SERPL-MCNC: 6.2 G/DL (ref 6–8.4)
RBC # BLD AUTO: 4.76 M/UL (ref 4.6–6.2)
SODIUM SERPL-SCNC: 142 MMOL/L (ref 136–145)
TOXICOLOGY INFORMATION: NORMAL
TROPONIN I SERPL DL<=0.01 NG/ML-MCNC: 0.05 NG/ML (ref 0–0.03)
TROPONIN I SERPL DL<=0.01 NG/ML-MCNC: 0.15 NG/ML (ref 0–0.03)
WBC # BLD AUTO: 13.67 K/UL (ref 3.9–12.7)

## 2024-10-05 PROCEDURE — 63600175 PHARM REV CODE 636 W HCPCS: Performed by: STUDENT IN AN ORGANIZED HEALTH CARE EDUCATION/TRAINING PROGRAM

## 2024-10-05 PROCEDURE — 93005 ELECTROCARDIOGRAM TRACING: CPT

## 2024-10-05 PROCEDURE — 99285 EMERGENCY DEPT VISIT HI MDM: CPT | Mod: 25

## 2024-10-05 PROCEDURE — 84484 ASSAY OF TROPONIN QUANT: CPT | Mod: 91

## 2024-10-05 PROCEDURE — 96374 THER/PROPH/DIAG INJ IV PUSH: CPT

## 2024-10-05 PROCEDURE — 96361 HYDRATE IV INFUSION ADD-ON: CPT

## 2024-10-05 PROCEDURE — 93010 ELECTROCARDIOGRAM REPORT: CPT | Mod: ,,, | Performed by: INTERNAL MEDICINE

## 2024-10-05 PROCEDURE — 82077 ASSAY SPEC XCP UR&BREATH IA: CPT

## 2024-10-05 PROCEDURE — 85025 COMPLETE CBC W/AUTO DIFF WBC: CPT

## 2024-10-05 PROCEDURE — 80053 COMPREHEN METABOLIC PANEL: CPT

## 2024-10-05 PROCEDURE — 80307 DRUG TEST PRSMV CHEM ANLYZR: CPT

## 2024-10-05 PROCEDURE — 96375 TX/PRO/DX INJ NEW DRUG ADDON: CPT

## 2024-10-05 PROCEDURE — 83605 ASSAY OF LACTIC ACID: CPT

## 2024-10-05 PROCEDURE — 82550 ASSAY OF CK (CPK): CPT

## 2024-10-05 PROCEDURE — 25000003 PHARM REV CODE 250

## 2024-10-05 PROCEDURE — 25000003 PHARM REV CODE 250: Performed by: STUDENT IN AN ORGANIZED HEALTH CARE EDUCATION/TRAINING PROGRAM

## 2024-10-05 RX ORDER — METHOCARBAMOL 500 MG/1
500 TABLET, FILM COATED ORAL
Status: COMPLETED | OUTPATIENT
Start: 2024-10-05 | End: 2024-10-05

## 2024-10-05 RX ORDER — KETOROLAC TROMETHAMINE 30 MG/ML
15 INJECTION, SOLUTION INTRAMUSCULAR; INTRAVENOUS
Status: COMPLETED | OUTPATIENT
Start: 2024-10-05 | End: 2024-10-05

## 2024-10-05 RX ORDER — ONDANSETRON HYDROCHLORIDE 2 MG/ML
4 INJECTION, SOLUTION INTRAVENOUS
Status: COMPLETED | OUTPATIENT
Start: 2024-10-05 | End: 2024-10-05

## 2024-10-05 RX ORDER — LIDOCAINE 50 MG/G
1 PATCH TOPICAL ONCE
Status: COMPLETED | OUTPATIENT
Start: 2024-10-06 | End: 2024-10-06

## 2024-10-05 RX ORDER — ASPIRIN 325 MG
325 TABLET ORAL
Status: COMPLETED | OUTPATIENT
Start: 2024-10-06 | End: 2024-10-06

## 2024-10-05 RX ADMIN — KETOROLAC TROMETHAMINE 15 MG: 30 INJECTION, SOLUTION INTRAMUSCULAR at 10:10

## 2024-10-05 RX ADMIN — METHOCARBAMOL 500 MG: 500 TABLET ORAL at 11:10

## 2024-10-05 RX ADMIN — SODIUM CHLORIDE, POTASSIUM CHLORIDE, SODIUM LACTATE AND CALCIUM CHLORIDE 2000 ML: 600; 310; 30; 20 INJECTION, SOLUTION INTRAVENOUS at 10:10

## 2024-10-05 RX ADMIN — LIDOCAINE 1 PATCH: 50 PATCH TOPICAL at 11:10

## 2024-10-05 RX ADMIN — POTASSIUM BICARBONATE 40 MEQ: 391 TABLET, EFFERVESCENT ORAL at 10:10

## 2024-10-05 RX ADMIN — ONDANSETRON 4 MG: 2 INJECTION INTRAMUSCULAR; INTRAVENOUS at 10:10

## 2024-10-05 NOTE — LETTER
October 8, 2024           Patient: Pradeep Ro     Date of Visit: 10/08/2024    To Whom It May Concern:    Jose David Ro  was at [facility] on 10/08/2024. The patient may return to work/school on  10/15/2024 with no restrictions. If you have any questions or concerns, or if I can be of further assistance, please do not hesitate to contact me.    Sincerely,  Nicci Vaughn RN

## 2024-10-05 NOTE — Clinical Note
The radial band was applied to the right radial artery. 25 cc's of air were inserted into the closure device.

## 2024-10-06 PROBLEM — E66.09 CLASS 1 OBESITY DUE TO EXCESS CALORIES WITHOUT SERIOUS COMORBIDITY IN ADULT: Status: ACTIVE | Noted: 2024-10-06

## 2024-10-06 PROBLEM — E78.2 MIXED HYPERLIPIDEMIA: Status: ACTIVE | Noted: 2024-02-21

## 2024-10-06 PROBLEM — R40.20 LOC (LOSS OF CONSCIOUSNESS): Status: ACTIVE | Noted: 2024-10-06

## 2024-10-06 PROBLEM — R79.89 ELEVATED TROPONIN: Status: ACTIVE | Noted: 2024-10-06

## 2024-10-06 PROBLEM — E66.811 CLASS 1 OBESITY DUE TO EXCESS CALORIES WITHOUT SERIOUS COMORBIDITY IN ADULT: Status: ACTIVE | Noted: 2024-10-06

## 2024-10-06 PROBLEM — M54.6 ACUTE MIDLINE THORACIC BACK PAIN: Status: ACTIVE | Noted: 2024-10-06

## 2024-10-06 PROBLEM — I10 PRIMARY HYPERTENSION: Status: ACTIVE | Noted: 2024-10-06

## 2024-10-06 LAB
ANION GAP SERPL CALC-SCNC: 10 MMOL/L (ref 8–16)
APICAL FOUR CHAMBER EJECTION FRACTION: 56 %
APICAL TWO CHAMBER EJECTION FRACTION: 63 %
ASCENDING AORTA: 3.22 CM
AV INDEX (PROSTH): 0.95
AV MEAN GRADIENT: 1.9 MMHG
AV PEAK GRADIENT: 3.2 MMHG
AV VALVE AREA BY VELOCITY RATIO: 3.2 CM²
AV VALVE AREA: 4 CM²
AV VELOCITY RATIO: 0.78
BASOPHILS # BLD AUTO: 0.02 K/UL (ref 0–0.2)
BASOPHILS NFR BLD: 0.2 % (ref 0–1.9)
BSA FOR ECHO PROCEDURE: 2.2 M2
BUN SERPL-MCNC: 8 MG/DL (ref 6–20)
CALCIUM SERPL-MCNC: 9 MG/DL (ref 8.7–10.5)
CHLORIDE SERPL-SCNC: 105 MMOL/L (ref 95–110)
CHOLEST SERPL-MCNC: 208 MG/DL (ref 120–199)
CHOLEST/HDLC SERPL: 5.3 {RATIO} (ref 2–5)
CO2 SERPL-SCNC: 26 MMOL/L (ref 23–29)
CREAT SERPL-MCNC: 1.2 MG/DL (ref 0.5–1.4)
CV ECHO LV RWT: 0.63 CM
DIFFERENTIAL METHOD BLD: ABNORMAL
DOP CALC AO PEAK VEL: 0.9 M/S
DOP CALC AO VTI: 16.6 CM
DOP CALC LVOT AREA: 4.2 CM2
DOP CALC LVOT DIAMETER: 2.3 CM
DOP CALC LVOT PEAK VEL: 0.7 M/S
DOP CALC LVOT STROKE VOLUME: 65.6 CM3
DOP CALCLVOT PEAK VEL VTI: 15.8 CM
E WAVE DECELERATION TIME: 116.43 MSEC
E/A RATIO: 1.06
E/E' RATIO: 6 M/S
ECHO LV POSTERIOR WALL: 1.3 CM (ref 0.6–1.1)
EOSINOPHIL # BLD AUTO: 0 K/UL (ref 0–0.5)
EOSINOPHIL NFR BLD: 0.2 % (ref 0–8)
ERYTHROCYTE [DISTWIDTH] IN BLOOD BY AUTOMATED COUNT: 13.2 % (ref 11.5–14.5)
EST. GFR  (NO RACE VARIABLE): >60 ML/MIN/1.73 M^2
ESTIMATED AVG GLUCOSE: 103 MG/DL (ref 68–131)
FRACTIONAL SHORTENING: 26.8 % (ref 28–44)
GLUCOSE SERPL-MCNC: 104 MG/DL (ref 70–110)
HBA1C MFR BLD: 5.2 % (ref 4–5.6)
HCT VFR BLD AUTO: 44.6 % (ref 40–54)
HDLC SERPL-MCNC: 39 MG/DL (ref 40–75)
HDLC SERPL: 18.8 % (ref 20–50)
HGB BLD-MCNC: 14.5 G/DL (ref 14–18)
IMM GRANULOCYTES # BLD AUTO: 0.03 K/UL (ref 0–0.04)
IMM GRANULOCYTES NFR BLD AUTO: 0.3 % (ref 0–0.5)
INTERVENTRICULAR SEPTUM: 1.3 CM (ref 0.6–1.1)
IVC DIAMETER: 0.93 CM
LA MAJOR: 5.27 CM
LA MINOR: 3.98 CM
LA WIDTH: 3.8 CM
LDLC SERPL CALC-MCNC: 156.6 MG/DL (ref 63–159)
LEFT ATRIUM SIZE: 3.35 CM
LEFT ATRIUM VOLUME INDEX: 22.8 ML/M2
LEFT ATRIUM VOLUME: 49.07 CM3
LEFT INTERNAL DIMENSION IN SYSTOLE: 3 CM (ref 2.1–4)
LEFT VENTRICLE DIASTOLIC VOLUME INDEX: 33.67 ML/M2
LEFT VENTRICLE DIASTOLIC VOLUME: 72.4 ML
LEFT VENTRICLE END DIASTOLIC VOLUME APICAL 2 CHAMBER: 119.23 ML
LEFT VENTRICLE END DIASTOLIC VOLUME APICAL 4 CHAMBER: 118.86 ML
LEFT VENTRICLE MASS INDEX: 90 G/M2
LEFT VENTRICLE SYSTOLIC VOLUME INDEX: 16 ML/M2
LEFT VENTRICLE SYSTOLIC VOLUME: 34.36 ML
LEFT VENTRICULAR INTERNAL DIMENSION IN DIASTOLE: 4.1 CM (ref 3.5–6)
LEFT VENTRICULAR MASS: 193.5 G
LV LATERAL E/E' RATIO: 6 M/S
LV SEPTAL E/E' RATIO: 6 M/S
LVED V (TEICH): 72.4 ML
LVES V (TEICH): 34.36 ML
LVOT MG: 1.26 MMHG
LVOT MV: 0.54 CM/S
LYMPHOCYTES # BLD AUTO: 1.3 K/UL (ref 1–4.8)
LYMPHOCYTES NFR BLD: 12.7 % (ref 18–48)
MCH RBC QN AUTO: 29.2 PG (ref 27–31)
MCHC RBC AUTO-ENTMCNC: 32.5 G/DL (ref 32–36)
MCV RBC AUTO: 90 FL (ref 82–98)
MONOCYTES # BLD AUTO: 0.7 K/UL (ref 0.3–1)
MONOCYTES NFR BLD: 7 % (ref 4–15)
MV PEAK A VEL: 0.62 M/S
MV PEAK E VEL: 0.66 M/S
MV STENOSIS PRESSURE HALF TIME: 33.77 MS
MV VALVE AREA P 1/2 METHOD: 6.51 CM2
NEUTROPHILS # BLD AUTO: 8.4 K/UL (ref 1.8–7.7)
NEUTROPHILS NFR BLD: 79.6 % (ref 38–73)
NONHDLC SERPL-MCNC: 169 MG/DL
NRBC BLD-RTO: 0 /100 WBC
OHS CV RV/LV RATIO: 0.78 CM
OHS LV EJECTION FRACTION SIMPSONS BIPLANE MOD: 59 %
PISA TR MAX VEL: 1.82 M/S
PLATELET # BLD AUTO: 270 K/UL (ref 150–450)
PMV BLD AUTO: 8.7 FL (ref 9.2–12.9)
POTASSIUM SERPL-SCNC: 3.7 MMOL/L (ref 3.5–5.1)
PULM VEIN S/D RATIO: 1.1
PV PEAK D VEL: 0.41 M/S
PV PEAK GRADIENT: 3 MMHG
PV PEAK S VEL: 0.45 M/S
PV PEAK VELOCITY: 0.91 M/S
RA MAJOR: 3.79 CM
RA PRESSURE ESTIMATED: 3 MMHG
RA WIDTH: 2.7 CM
RBC # BLD AUTO: 4.96 M/UL (ref 4.6–6.2)
RIGHT VENTRICLE DIASTOLIC BASEL DIMENSION: 3.2 CM
RIGHT VENTRICULAR END-DIASTOLIC DIMENSION: 3.15 CM
RV TB RVSP: 5 MMHG
RV TISSUE DOPPLER FREE WALL SYSTOLIC VELOCITY 1 (APICAL 4 CHAMBER VIEW): 12.69 CM/S
SINUS: 3.35 CM
SODIUM SERPL-SCNC: 141 MMOL/L (ref 136–145)
STJ: 3.21 CM
TDI LATERAL: 0.11 M/S
TDI SEPTAL: 0.11 M/S
TDI: 0.11 M/S
TR MAX PG: 13 MMHG
TRICUSPID ANNULAR PLANE SYSTOLIC EXCURSION: 2.27 CM
TRIGL SERPL-MCNC: 62 MG/DL (ref 30–150)
TROPONIN I SERPL DL<=0.01 NG/ML-MCNC: 0.09 NG/ML (ref 0–0.03)
TROPONIN I SERPL DL<=0.01 NG/ML-MCNC: 0.11 NG/ML (ref 0–0.03)
TROPONIN I SERPL DL<=0.01 NG/ML-MCNC: 0.14 NG/ML (ref 0–0.03)
TV REST PULMONARY ARTERY PRESSURE: 16 MMHG
WBC # BLD AUTO: 10.52 K/UL (ref 3.9–12.7)
Z-SCORE OF LEFT VENTRICULAR DIMENSION IN END DIASTOLE: -5.32
Z-SCORE OF LEFT VENTRICULAR DIMENSION IN END SYSTOLE: -2.76

## 2024-10-06 PROCEDURE — 80061 LIPID PANEL: CPT | Performed by: STUDENT IN AN ORGANIZED HEALTH CARE EDUCATION/TRAINING PROGRAM

## 2024-10-06 PROCEDURE — 80048 BASIC METABOLIC PNL TOTAL CA: CPT | Performed by: STUDENT IN AN ORGANIZED HEALTH CARE EDUCATION/TRAINING PROGRAM

## 2024-10-06 PROCEDURE — 96376 TX/PRO/DX INJ SAME DRUG ADON: CPT

## 2024-10-06 PROCEDURE — 83036 HEMOGLOBIN GLYCOSYLATED A1C: CPT | Performed by: STUDENT IN AN ORGANIZED HEALTH CARE EDUCATION/TRAINING PROGRAM

## 2024-10-06 PROCEDURE — 94761 N-INVAS EAR/PLS OXIMETRY MLT: CPT

## 2024-10-06 PROCEDURE — 63600175 PHARM REV CODE 636 W HCPCS: Performed by: INTERNAL MEDICINE

## 2024-10-06 PROCEDURE — 96375 TX/PRO/DX INJ NEW DRUG ADDON: CPT

## 2024-10-06 PROCEDURE — 25500020 PHARM REV CODE 255: Performed by: STUDENT IN AN ORGANIZED HEALTH CARE EDUCATION/TRAINING PROGRAM

## 2024-10-06 PROCEDURE — 96372 THER/PROPH/DIAG INJ SC/IM: CPT | Performed by: INTERNAL MEDICINE

## 2024-10-06 PROCEDURE — 84484 ASSAY OF TROPONIN QUANT: CPT | Performed by: STUDENT IN AN ORGANIZED HEALTH CARE EDUCATION/TRAINING PROGRAM

## 2024-10-06 PROCEDURE — 25000003 PHARM REV CODE 250: Performed by: STUDENT IN AN ORGANIZED HEALTH CARE EDUCATION/TRAINING PROGRAM

## 2024-10-06 PROCEDURE — 25000003 PHARM REV CODE 250

## 2024-10-06 PROCEDURE — 84484 ASSAY OF TROPONIN QUANT: CPT | Mod: 91 | Performed by: STUDENT IN AN ORGANIZED HEALTH CARE EDUCATION/TRAINING PROGRAM

## 2024-10-06 PROCEDURE — 96361 HYDRATE IV INFUSION ADD-ON: CPT

## 2024-10-06 PROCEDURE — 99204 OFFICE O/P NEW MOD 45 MIN: CPT | Mod: 25,,, | Performed by: INTERNAL MEDICINE

## 2024-10-06 PROCEDURE — 36415 COLL VENOUS BLD VENIPUNCTURE: CPT | Performed by: STUDENT IN AN ORGANIZED HEALTH CARE EDUCATION/TRAINING PROGRAM

## 2024-10-06 PROCEDURE — 63600175 PHARM REV CODE 636 W HCPCS: Performed by: STUDENT IN AN ORGANIZED HEALTH CARE EDUCATION/TRAINING PROGRAM

## 2024-10-06 PROCEDURE — G0378 HOSPITAL OBSERVATION PER HR: HCPCS

## 2024-10-06 PROCEDURE — 25000003 PHARM REV CODE 250: Performed by: INTERNAL MEDICINE

## 2024-10-06 PROCEDURE — 85025 COMPLETE CBC W/AUTO DIFF WBC: CPT | Performed by: STUDENT IN AN ORGANIZED HEALTH CARE EDUCATION/TRAINING PROGRAM

## 2024-10-06 PROCEDURE — 99900035 HC TECH TIME PER 15 MIN (STAT)

## 2024-10-06 RX ORDER — HYDROCODONE BITARTRATE AND ACETAMINOPHEN 5; 325 MG/1; MG/1
1 TABLET ORAL ONCE
Status: DISCONTINUED | OUTPATIENT
Start: 2024-10-06 | End: 2024-10-06

## 2024-10-06 RX ORDER — ATORVASTATIN CALCIUM 10 MG/1
20 TABLET, FILM COATED ORAL DAILY
Status: DISCONTINUED | OUTPATIENT
Start: 2024-10-06 | End: 2024-10-08 | Stop reason: HOSPADM

## 2024-10-06 RX ORDER — IBUPROFEN 200 MG
24 TABLET ORAL
Status: DISCONTINUED | OUTPATIENT
Start: 2024-10-06 | End: 2024-10-08 | Stop reason: HOSPADM

## 2024-10-06 RX ORDER — GLUCAGON 1 MG
1 KIT INJECTION
Status: DISCONTINUED | OUTPATIENT
Start: 2024-10-06 | End: 2024-10-08 | Stop reason: HOSPADM

## 2024-10-06 RX ORDER — ONDANSETRON HYDROCHLORIDE 2 MG/ML
4 INJECTION, SOLUTION INTRAVENOUS EVERY 6 HOURS PRN
Status: DISCONTINUED | OUTPATIENT
Start: 2024-10-06 | End: 2024-10-08 | Stop reason: HOSPADM

## 2024-10-06 RX ORDER — LANOLIN ALCOHOL/MO/W.PET/CERES
800 CREAM (GRAM) TOPICAL
Status: DISCONTINUED | OUTPATIENT
Start: 2024-10-06 | End: 2024-10-08 | Stop reason: HOSPADM

## 2024-10-06 RX ORDER — SODIUM,POTASSIUM PHOSPHATES 280-250MG
2 POWDER IN PACKET (EA) ORAL
Status: DISCONTINUED | OUTPATIENT
Start: 2024-10-06 | End: 2024-10-08 | Stop reason: HOSPADM

## 2024-10-06 RX ORDER — HYDROCODONE BITARTRATE AND ACETAMINOPHEN 5; 325 MG/1; MG/1
1 TABLET ORAL
Status: COMPLETED | OUTPATIENT
Start: 2024-10-06 | End: 2024-10-06

## 2024-10-06 RX ORDER — NALOXONE HCL 0.4 MG/ML
0.02 VIAL (ML) INJECTION
Status: DISCONTINUED | OUTPATIENT
Start: 2024-10-06 | End: 2024-10-08 | Stop reason: HOSPADM

## 2024-10-06 RX ORDER — ALUMINUM HYDROXIDE, MAGNESIUM HYDROXIDE, AND SIMETHICONE 1200; 120; 1200 MG/30ML; MG/30ML; MG/30ML
30 SUSPENSION ORAL 4 TIMES DAILY PRN
Status: DISCONTINUED | OUTPATIENT
Start: 2024-10-06 | End: 2024-10-08 | Stop reason: HOSPADM

## 2024-10-06 RX ORDER — HYDROCODONE BITARTRATE AND ACETAMINOPHEN 5; 325 MG/1; MG/1
1 TABLET ORAL ONCE
Status: COMPLETED | OUTPATIENT
Start: 2024-10-06 | End: 2024-10-06

## 2024-10-06 RX ORDER — AMLODIPINE BESYLATE 5 MG/1
5 TABLET ORAL DAILY
Status: DISCONTINUED | OUTPATIENT
Start: 2024-10-06 | End: 2024-10-08 | Stop reason: HOSPADM

## 2024-10-06 RX ORDER — NAPROXEN SODIUM 220 MG/1
81 TABLET, FILM COATED ORAL DAILY
Status: DISCONTINUED | OUTPATIENT
Start: 2024-10-07 | End: 2024-10-07

## 2024-10-06 RX ORDER — MELOXICAM 7.5 MG/1
15 TABLET ORAL DAILY
Status: DISCONTINUED | OUTPATIENT
Start: 2024-10-06 | End: 2024-10-06

## 2024-10-06 RX ORDER — CLOPIDOGREL BISULFATE 300 MG/1
300 TABLET, FILM COATED ORAL ONCE
Status: COMPLETED | OUTPATIENT
Start: 2024-10-06 | End: 2024-10-06

## 2024-10-06 RX ORDER — PROCHLORPERAZINE EDISYLATE 5 MG/ML
5 INJECTION INTRAMUSCULAR; INTRAVENOUS EVERY 6 HOURS PRN
Status: DISCONTINUED | OUTPATIENT
Start: 2024-10-06 | End: 2024-10-08 | Stop reason: HOSPADM

## 2024-10-06 RX ORDER — MORPHINE SULFATE ORAL SOLUTION 10 MG/5ML
10 SOLUTION ORAL EVERY 6 HOURS PRN
Status: DISCONTINUED | OUTPATIENT
Start: 2024-10-06 | End: 2024-10-06

## 2024-10-06 RX ORDER — GABAPENTIN 300 MG/1
300 CAPSULE ORAL NIGHTLY
Status: DISCONTINUED | OUTPATIENT
Start: 2024-10-06 | End: 2024-10-08 | Stop reason: HOSPADM

## 2024-10-06 RX ORDER — ONDANSETRON HYDROCHLORIDE 2 MG/ML
4 INJECTION, SOLUTION INTRAVENOUS EVERY 8 HOURS PRN
Status: DISCONTINUED | OUTPATIENT
Start: 2024-10-06 | End: 2024-10-06

## 2024-10-06 RX ORDER — SODIUM CHLORIDE 0.9 % (FLUSH) 0.9 %
10 SYRINGE (ML) INJECTION
Status: DISCONTINUED | OUTPATIENT
Start: 2024-10-06 | End: 2024-10-08 | Stop reason: HOSPADM

## 2024-10-06 RX ORDER — SODIUM CHLORIDE 9 MG/ML
INJECTION, SOLUTION INTRAVENOUS CONTINUOUS
Status: ACTIVE | OUTPATIENT
Start: 2024-10-07 | End: 2024-10-07

## 2024-10-06 RX ORDER — CLOPIDOGREL BISULFATE 75 MG/1
75 TABLET ORAL DAILY
Status: DISCONTINUED | OUTPATIENT
Start: 2024-10-07 | End: 2024-10-07

## 2024-10-06 RX ORDER — ONDANSETRON HYDROCHLORIDE 2 MG/ML
4 INJECTION, SOLUTION INTRAVENOUS
Status: COMPLETED | OUTPATIENT
Start: 2024-10-06 | End: 2024-10-06

## 2024-10-06 RX ORDER — ACETAMINOPHEN 325 MG/1
650 TABLET ORAL EVERY 4 HOURS PRN
Status: DISCONTINUED | OUTPATIENT
Start: 2024-10-06 | End: 2024-10-08 | Stop reason: HOSPADM

## 2024-10-06 RX ORDER — ACETAMINOPHEN 325 MG/1
650 TABLET ORAL EVERY 8 HOURS PRN
Status: DISCONTINUED | OUTPATIENT
Start: 2024-10-06 | End: 2024-10-06

## 2024-10-06 RX ORDER — HYDROCODONE BITARTRATE AND ACETAMINOPHEN 5; 325 MG/1; MG/1
1 TABLET ORAL EVERY 6 HOURS PRN
Status: DISCONTINUED | OUTPATIENT
Start: 2024-10-06 | End: 2024-10-08 | Stop reason: HOSPADM

## 2024-10-06 RX ORDER — IBUPROFEN 200 MG
16 TABLET ORAL
Status: DISCONTINUED | OUTPATIENT
Start: 2024-10-06 | End: 2024-10-08 | Stop reason: HOSPADM

## 2024-10-06 RX ORDER — ACETAMINOPHEN 500 MG
1000 TABLET ORAL EVERY 8 HOURS PRN
Status: DISCONTINUED | OUTPATIENT
Start: 2024-10-06 | End: 2024-10-06

## 2024-10-06 RX ORDER — BISACODYL 10 MG/1
10 SUPPOSITORY RECTAL DAILY PRN
Status: DISCONTINUED | OUTPATIENT
Start: 2024-10-06 | End: 2024-10-08 | Stop reason: HOSPADM

## 2024-10-06 RX ORDER — DIPHENHYDRAMINE HCL 25 MG
50 CAPSULE ORAL ONCE
Status: COMPLETED | OUTPATIENT
Start: 2024-10-07 | End: 2024-10-07

## 2024-10-06 RX ORDER — ENOXAPARIN SODIUM 100 MG/ML
1 INJECTION SUBCUTANEOUS
Status: COMPLETED | OUTPATIENT
Start: 2024-10-06 | End: 2024-10-06

## 2024-10-06 RX ORDER — POLYETHYLENE GLYCOL 3350 17 G/17G
17 POWDER, FOR SOLUTION ORAL DAILY
Status: DISCONTINUED | OUTPATIENT
Start: 2024-10-06 | End: 2024-10-08 | Stop reason: HOSPADM

## 2024-10-06 RX ORDER — ENOXAPARIN SODIUM 100 MG/ML
40 INJECTION SUBCUTANEOUS EVERY 24 HOURS
Status: DISCONTINUED | OUTPATIENT
Start: 2024-10-06 | End: 2024-10-06

## 2024-10-06 RX ORDER — SODIUM CHLORIDE 0.9 % (FLUSH) 0.9 %
10 SYRINGE (ML) INJECTION EVERY 12 HOURS PRN
Status: DISCONTINUED | OUTPATIENT
Start: 2024-10-06 | End: 2024-10-08 | Stop reason: HOSPADM

## 2024-10-06 RX ADMIN — SODIUM CHLORIDE: 9 INJECTION, SOLUTION INTRAVENOUS at 11:10

## 2024-10-06 RX ADMIN — POLYETHYLENE GLYCOL 3350 17 G: 17 POWDER, FOR SOLUTION ORAL at 09:10

## 2024-10-06 RX ADMIN — HYDROCODONE BITARTRATE AND ACETAMINOPHEN 1 TABLET: 5; 325 TABLET ORAL at 01:10

## 2024-10-06 RX ADMIN — CLOPIDOGREL BISULFATE 300 MG: 300 TABLET, FILM COATED ORAL at 09:10

## 2024-10-06 RX ADMIN — ASPIRIN 325 MG ORAL TABLET 325 MG: 325 PILL ORAL at 01:10

## 2024-10-06 RX ADMIN — ATORVASTATIN CALCIUM 20 MG: 10 TABLET, FILM COATED ORAL at 09:10

## 2024-10-06 RX ADMIN — MELOXICAM 15 MG: 7.5 TABLET ORAL at 09:10

## 2024-10-06 RX ADMIN — IOHEXOL 100 ML: 350 INJECTION, SOLUTION INTRAVENOUS at 12:10

## 2024-10-06 RX ADMIN — ENOXAPARIN SODIUM 100 MG: 100 INJECTION SUBCUTANEOUS at 10:10

## 2024-10-06 RX ADMIN — ONDANSETRON 4 MG: 2 INJECTION INTRAMUSCULAR; INTRAVENOUS at 01:10

## 2024-10-06 RX ADMIN — ENOXAPARIN SODIUM 100 MG: 100 INJECTION SUBCUTANEOUS at 09:10

## 2024-10-06 RX ADMIN — HYDROCODONE BITARTRATE AND ACETAMINOPHEN 1 TABLET: 5; 325 TABLET ORAL at 04:10

## 2024-10-06 RX ADMIN — HYDROCODONE BITARTRATE AND ACETAMINOPHEN 1 TABLET: 5; 325 TABLET ORAL at 10:10

## 2024-10-06 RX ADMIN — ONDANSETRON 4 MG: 2 INJECTION INTRAMUSCULAR; INTRAVENOUS at 10:10

## 2024-10-06 RX ADMIN — GABAPENTIN 300 MG: 300 CAPSULE ORAL at 10:10

## 2024-10-06 RX ADMIN — AMLODIPINE BESYLATE 5 MG: 5 TABLET ORAL at 09:10

## 2024-10-06 RX ADMIN — PROCHLORPERAZINE EDISYLATE 5 MG: 5 INJECTION INTRAMUSCULAR; INTRAVENOUS at 02:10

## 2024-10-06 NOTE — ASSESSMENT & PLAN NOTE
Patients blood pressure range in the last 24 hours was: BP  Min: 135/80  Max: 168/77.The patient's inpatient anti-hypertensive regimen is listed below:  Current Antihypertensives   Amlodipine 5mg daily    Plan  - BP is uncontrolled, restart home amlodipine

## 2024-10-06 NOTE — ASSESSMENT & PLAN NOTE
The patient presents after being found confused on the bathroom floor, presumed syncope and possible seizure.    The patient has a history of seizure, last seizure being 10 years ago.    EKGs normal.    Echocardiogram normal  Cath normal (elev trop noted).  Neuro eval ongoing.

## 2024-10-06 NOTE — HPI
34 y.o. male with PMH of Hypertension and back pain presents to the ED for evaluation of altered mental status and fall.    Patient reports he went to the bathroom to have a bowel movement then does not remember what happened.  His wife found him a couple hours later when she came home on the bathroom floor with blood coming from his mouth and nose.    The patient was confused when he woke up but able to stand up and walk.  He arrived to the ED confused. Subsequently patient retunred to his baseline but afterwards reported back pain with radiates to the middle of his shoulder blades, feels like heaviness and radiates to his shoulders.  Patient attempted Tylenol 1,000 mg and Meloxicam at 6:25 am this morning as treatment/medication with slight relief. No other alleviating or exacerbating factors.Patient does not have family hx of MI but reports maybe that his father had heart issues. He does not have a presonal hx of similar chest pain with exertion and he is able to lift weight or go up and down stairs without chest pain.   Denies history of kidney stones. Denies tobacco use. Denies chest pain, SOB, leg swelling, abdominal pain, dysuria, frequency, or other associated symptoms.    In the ED he was found to have leucocytosis, hypokalemia, acidosis, hypocalcimia and troponin leak.  CT Angio negative for PE. His Hip, Shoulder and CXR were negative for acute pathology  CT Head and maxillofacial bones did not identify any fractures    Patient is admitted for ACS rule out

## 2024-10-06 NOTE — SUBJECTIVE & OBJECTIVE
Past Medical History:   Diagnosis Date    Hyperlipidemia, unspecified 02/21/2024    JUAN RAMON (obstructive sleep apnea) 02/24/2024    Primary hypertension 10/6/2024       History reviewed. No pertinent surgical history.    Review of patient's allergies indicates:  No Known Allergies    No current facility-administered medications on file prior to encounter.     Current Outpatient Medications on File Prior to Encounter   Medication Sig    acetaminophen (TYLENOL) 500 MG tablet Take 1-2 tablets (500-1,000 mg total) by mouth 3 (three) times daily as needed for Pain.    amLODIPine (NORVASC) 5 MG tablet Take 1 tablet (5 mg total) by mouth once daily.    atorvastatin (LIPITOR) 20 MG tablet Take with food/milk.Take or use exactly as directed.Obtain advice for OTCs.Do not take if pregnant.Avoid grapefruit and grapefruit juice.    gabapentin (NEURONTIN) 300 MG capsule Take 1 capsule (300 mg total) by mouth every evening. In 1 wk, if no relief, increase to twice daily.In another wk, may increase to 3 times.    meloxicam (MOBIC) 15 MG tablet Take 1 tablet (15 mg total) by mouth once daily.    orphenadrine (NORFLEX) 100 mg tablet Take 1 tablet (100 mg total) by mouth every 12 (twelve) hours as needed.     Family History       Problem Relation (Age of Onset)    Heart disease Father, Paternal Grandfather    Hyperlipidemia Father, Paternal Grandfather          Tobacco Use    Smoking status: Never    Smokeless tobacco: Never   Substance and Sexual Activity    Alcohol use: Yes     Alcohol/week: 6.0 standard drinks of alcohol     Types: 3 Shots of liquor, 3 Drinks containing 0.5 oz of alcohol per week    Drug use: No    Sexual activity: Yes     Partners: Female     Birth control/protection: OCP     Review of Systems   Constitutional: Negative for chills, diaphoresis, fever and malaise/fatigue.   HENT:  Negative for nosebleeds.    Eyes:  Negative for blurred vision and double vision.   Cardiovascular:  Negative for chest pain, claudication,  cyanosis, dyspnea on exertion, leg swelling, orthopnea, palpitations, paroxysmal nocturnal dyspnea and syncope.   Respiratory:  Negative for cough, shortness of breath and wheezing.    Skin:  Negative for dry skin and poor wound healing.   Musculoskeletal:  Negative for back pain, joint swelling and myalgias.   Gastrointestinal:  Negative for abdominal pain, nausea and vomiting.   Genitourinary:  Negative for hematuria.   Neurological:  Positive for seizures. Negative for dizziness, headaches, numbness and weakness.   Psychiatric/Behavioral:  Negative for altered mental status and depression.      Objective:     Vital Signs (Most Recent):  Temp: 98.2 °F (36.8 °C) (10/06/24 0749)  Pulse: 66 (10/06/24 0846)  Resp: 19 (10/06/24 0749)  BP: 129/79 (10/06/24 0749)  SpO2: 99 % (10/06/24 0846) Vital Signs (24h Range):  Temp:  [97.5 °F (36.4 °C)-98.4 °F (36.9 °C)] 98.2 °F (36.8 °C)  Pulse:  [66-89] 66  Resp:  [14-20] 19  SpO2:  [96 %-99 %] 99 %  BP: (129-159)/(79-97) 129/79     Weight: 99.4 kg (219 lb 2.2 oz)  Body mass index is 32.36 kg/m².    SpO2: 99 %         Intake/Output Summary (Last 24 hours) at 10/6/2024 0854  Last data filed at 10/6/2024 0211  Gross per 24 hour   Intake 2000 ml   Output 2300 ml   Net -300 ml       Lines/Drains/Airways       Peripheral Intravenous Line  Duration                  Peripheral IV - Single Lumen 10/05/24 2016 18 G Anterior;Distal;Left Upper Arm <1 day                     Physical Exam  Constitutional:       General: He is not in acute distress.     Appearance: He is well-developed. He is obese. He is not ill-appearing, toxic-appearing or diaphoretic.   HENT:      Head: Normocephalic and atraumatic.   Eyes:      General: No scleral icterus.     Extraocular Movements: Extraocular movements intact.      Conjunctiva/sclera: Conjunctivae normal.      Pupils: Pupils are equal, round, and reactive to light.   Neck:      Thyroid: No thyromegaly.      Vascular: No JVD.      Trachea: No tracheal  deviation.   Cardiovascular:      Rate and Rhythm: Normal rate and regular rhythm.      Pulses:           Radial pulses are 2+ on the right side.      Heart sounds: S1 normal and S2 normal. No murmur heard.     No friction rub. No gallop.   Pulmonary:      Effort: Pulmonary effort is normal. No respiratory distress.      Breath sounds: Normal breath sounds. No stridor. No wheezing, rhonchi or rales.   Chest:      Chest wall: No tenderness.   Abdominal:      General: There is no distension.      Palpations: Abdomen is soft.   Musculoskeletal:         General: No swelling or tenderness. Normal range of motion.      Cervical back: Normal range of motion and neck supple. No rigidity.      Right lower leg: No edema.      Left lower leg: No edema.   Skin:     General: Skin is warm and dry.      Coloration: Skin is not jaundiced.   Neurological:      General: No focal deficit present.      Mental Status: He is alert and oriented to person, place, and time.      Cranial Nerves: No cranial nerve deficit.   Psychiatric:         Mood and Affect: Mood normal.         Behavior: Behavior normal.          Current Medications:   amLODIPine  5 mg Oral Daily    atorvastatin  20 mg Oral Daily    enoxparin  40 mg Subcutaneous Daily    gabapentin  300 mg Oral QHS    LIDOcaine  1 patch Transdermal Once    meloxicam  15 mg Oral Daily    polyethylene glycol  17 g Oral Daily         Current Facility-Administered Medications:     acetaminophen, 1,000 mg, Oral, Q8H PRN    acetaminophen, 650 mg, Oral, Q4H PRN    acetaminophen, 650 mg, Oral, Q8H PRN    aluminum-magnesium hydroxide-simethicone, 30 mL, Oral, QID PRN    bisacodyL, 10 mg, Rectal, Daily PRN    dextrose 10%, 12.5 g, Intravenous, PRN    dextrose 10%, 25 g, Intravenous, PRN    glucagon (human recombinant), 1 mg, Intramuscular, PRN    glucose, 16 g, Oral, PRN    glucose, 24 g, Oral, PRN    magnesium oxide, 800 mg, Oral, PRN    magnesium oxide, 800 mg, Oral, PRN    morphine, 10 mg, Oral,  Q6H PRN    naloxone, 0.02 mg, Intravenous, PRN    ondansetron, 4 mg, Intravenous, Q6H PRN    potassium bicarbonate, 35 mEq, Oral, PRN    potassium bicarbonate, 50 mEq, Oral, PRN    potassium bicarbonate, 60 mEq, Oral, PRN    potassium, sodium phosphates, 2 packet, Oral, PRN    potassium, sodium phosphates, 2 packet, Oral, PRN    prochlorperazine, 5 mg, Intravenous, Q6H PRN    sodium chloride 0.9%, 10 mL, Intravenous, Q12H PRN    Laboratory (all labs reviewed):  CBC:  Recent Labs   Lab 02/26/24  0830 10/04/24  1028 10/05/24  2049 10/06/24  0508   WBC 3.92 3.73 L 13.67 H 10.52   Hemoglobin 15.8 15.8 14.4 14.5   Hematocrit 48.3 48.3 42.7 44.6   Platelets 292 277 230 270       CHEMISTRIES:  Recent Labs   Lab 02/26/24  0830 10/04/24  1028 10/05/24  2049 10/06/24  0508   Glucose 93 82 99 104   Sodium 139 140 142 141   Potassium 4.6 3.8 3.0 L 3.7   BUN 12 10 10 8   Creatinine 1.2 1.1 1.1 1.2   eGFR >60.0 >60 >60 >60   Calcium 9.6 9.1 7.4 L 9.0       CARDIAC BIOMARKERS:  Recent Labs   Lab 10/05/24  2049 10/05/24  2308 10/06/24  0130 10/06/24  0508    H  --   --   --    Troponin I 0.053 H 0.147 H 0.142 H 0.111 H       COAGS:        LIPIDS/LFTS:  Recent Labs   Lab 02/26/24  0830 10/04/24  1028 10/05/24  2049 10/06/24  0508   Cholesterol 229 H  --   --  208 H   Triglycerides 87  --   --  62   HDL 40  --   --  39 L   LDL Cholesterol 171.6 H  --   --  156.6   Non-HDL Cholesterol 189  --   --  169   AST 23 20 18  --    ALT 23 21 20  --        BNP:        TSH:  Recent Labs   Lab 02/26/24  0830   TSH 0.900       Free T4:  Recent Labs   Lab 02/26/24  0830   Free T4 0.82       Diagnostic Results:  ECG (personally reviewed and interpreted tracing(s)):  10/5/24 2046 SR 87    Chest X-Ray (personally reviewed and interpreted image(s)): 10/5/24 NAD    CTA Chest/Abd 10/6/24   1. No evidence of aortic aneurysm or dissection.  2. No acute intrathoracic or intra-abdominal abnormalities identified.    Echo: ordered

## 2024-10-06 NOTE — CONSULTS
[facility] - Telemetry  Cardiology  Consult Note    Patient Name: Pradeep Ro  MRN: [id number]  Admission Date: 10/5/2024  Hospital Length of Stay: 0 days  Code Status: Full Code   Attending Provider: Renée Ansari,[place]het[place]   Consulting Provider: Konstantin Peres MD  Primary Care Physician: Mauro Gilmore MD  Principal Problem:LOC (loss of consciousness)    Patient information was obtained from patient and ER records.     Inpatient consult to Cardiology  Consult performed by: Konstantin Peres MD  Consult ordered by: Nurys Burnett MD  Reason for consult: elev trop        Subjective:     Chief Complaint:  LOC     HPI:   34 y.o. male with PMH of Hypertension and back pain presents to the ED for evaluation of altered mental status and fall.  Patient reports he went to the bathroom to have a bowel movement then does not remember what happened.  His wife found him a couple hours later when she came home on the bathroom floor with blood coming from his mouth and nose.    The patient was confused when he woke up but able to stand up and walk.  He arrived to the ED confused. Subsequently patient retunred to his baseline but afterwards reported back pain with radiates to the middle of his shoulder blades, feels like heaviness and radiates to his shoulders.  Patient attempted Tylenol 1,000 mg and Meloxicam at 6:25 am this morning as treatment/medication with slight relief. No other alleviating or exacerbating factors.Patient does not have family hx of MI but reports maybe that his father had heart issues. He does not have a presonal hx of similar chest pain with exertion and he is able to lift weight or go up and down stairs without chest pain. Denies history of kidney stones. Denies tobacco use. Denies chest pain, SOB, leg swelling, abdominal pain, dysuria, frequency, or other associated symptoms.    Cardiology consulted for ?NSTEMI/CP.    The patient is a very pleasant 34-year-old man who is admitted for evaluation  loss of consciousness.  I also discussed the case with his wife.  She found him on the floor in the bathroom with quite a bit of blood coming out of his nose and mouth.  He was waking up at the time, but was quite confused for approximately one hour.  His mentation is now back to baseline.  The patient reports a prior history of seizure 10 years ago but none in the interim.  He denies any angina, dyspnea, palpitations, or syncope.  He has had no cardiac history.  There was no family history of premature CAD.  He is not a smoker.  His EKGs normal.  Echocardiogram has been ordered.  Note is made of a troponin of 0.05 which has risen to 0.1.  We discussed the pros and cons of noninvasive versus invasive ischemia evaluation we decided to move forward with coronary angiogram.    Past Medical History:   Diagnosis Date    Hyperlipidemia, unspecified 02/21/2024    JUAN RAMON (obstructive sleep apnea) 02/24/2024    Primary hypertension 10/6/2024       History reviewed. No pertinent surgical history.    Review of patient's allergies indicates:  No Known Allergies    No current facility-administered medications on file prior to encounter.     Current Outpatient Medications on File Prior to Encounter   Medication Sig    acetaminophen (TYLENOL) 500 MG tablet Take 1-2 tablets (500-1,000 mg total) by mouth 3 (three) times daily as needed for Pain.    amLODIPine (NORVASC) 5 MG tablet Take 1 tablet (5 mg total) by mouth once daily.    atorvastatin (LIPITOR) 20 MG tablet Take with food/milk.Take or use exactly as directed.Obtain advice for OTCs.Do not take if pregnant.Avoid grapefruit and grapefruit juice.    gabapentin (NEURONTIN) 300 MG capsule Take 1 capsule (300 mg total) by mouth every evening. In 1 wk, if no relief, increase to twice daily.In another wk, may increase to 3 times.    meloxicam (MOBIC) 15 MG tablet Take 1 tablet (15 mg total) by mouth once daily.    orphenadrine (NORFLEX) 100 mg tablet Take 1 tablet (100 mg total) by  mouth every 12 (twelve) hours as needed.     Family History       Problem Relation (Age of Onset)    Heart disease Father, Paternal Grandfather    Hyperlipidemia Father, Paternal Grandfather          Tobacco Use    Smoking status: Never    Smokeless tobacco: Never   Substance and Sexual Activity    Alcohol use: Yes     Alcohol/week: 6.0 standard drinks of alcohol     Types: 3 Shots of liquor, 3 Drinks containing 0.5 oz of alcohol per week    Drug use: No    Sexual activity: Yes     Partners: Female     Birth control/protection: OCP     Review of Systems   Constitutional: Negative for chills, diaphoresis, fever and malaise/fatigue.   HENT:  Negative for nosebleeds.    Eyes:  Negative for blurred vision and double vision.   Cardiovascular:  Negative for chest pain, claudication, cyanosis, dyspnea on exertion, leg swelling, orthopnea, palpitations, paroxysmal nocturnal dyspnea and syncope.   Respiratory:  Negative for cough, shortness of breath and wheezing.    Skin:  Negative for dry skin and poor wound healing.   Musculoskeletal:  Negative for back pain, joint swelling and myalgias.   Gastrointestinal:  Negative for abdominal pain, nausea and vomiting.   Genitourinary:  Negative for hematuria.   Neurological:  Positive for seizures. Negative for dizziness, headaches, numbness and weakness.   Psychiatric/Behavioral:  Negative for altered mental status and depression.      Objective:     Vital Signs (Most Recent):  Temp: 98.2 °F (36.8 °C) (10/06/24 0749)  Pulse: 66 (10/06/24 0846)  Resp: 19 (10/06/24 0749)  BP: 129/79 (10/06/24 0749)  SpO2: 99 % (10/06/24 0846) Vital Signs (24h Range):  Temp:  [97.5 °F (36.4 °C)-98.4 °F (36.9 °C)] 98.2 °F (36.8 °C)  Pulse:  [66-89] 66  Resp:  [14-20] 19  SpO2:  [96 %-99 %] 99 %  BP: (129-159)/(79-97) 129/79     Weight: 99.4 kg (219 lb 2.2 oz)  Body mass index is 32.36 kg/m².    SpO2: 99 %         Intake/Output Summary (Last 24 hours) at 10/6/2024 0854  Last data filed at 10/6/2024  0211  Gross per 24 hour   Intake 2000 ml   Output 2300 ml   Net -300 ml       Lines/Drains/Airways       Peripheral Intravenous Line  Duration                  Peripheral IV - Single Lumen 10/05/24 2016 18 G Anterior;Distal;Left Upper Arm <1 day                     Physical Exam  Constitutional:       General: He is not in acute distress.     Appearance: He is well-developed. He is obese. He is not ill-appearing, toxic-appearing or diaphoretic.   HENT:      Head: Normocephalic and atraumatic.   Eyes:      General: No scleral icterus.     Extraocular Movements: Extraocular movements intact.      Conjunctiva/sclera: Conjunctivae normal.      Pupils: Pupils are equal, round, and reactive to light.   Neck:      Thyroid: No thyromegaly.      Vascular: No JVD.      Trachea: No tracheal deviation.   Cardiovascular:      Rate and Rhythm: Normal rate and regular rhythm.      Pulses:           Radial pulses are 2+ on the right side.      Heart sounds: S1 normal and S2 normal. No murmur heard.     No friction rub. No gallop.   Pulmonary:      Effort: Pulmonary effort is normal. No respiratory distress.      Breath sounds: Normal breath sounds. No stridor. No wheezing, rhonchi or rales.   Chest:      Chest wall: No tenderness.   Abdominal:      General: There is no distension.      Palpations: Abdomen is soft.   Musculoskeletal:         General: No swelling or tenderness. Normal range of motion.      Cervical back: Normal range of motion and neck supple. No rigidity.      Right lower leg: No edema.      Left lower leg: No edema.   Skin:     General: Skin is warm and dry.      Coloration: Skin is not jaundiced.   Neurological:      General: No focal deficit present.      Mental Status: He is alert and oriented to person, place, and time.      Cranial Nerves: No cranial nerve deficit.   Psychiatric:         Mood and Affect: Mood normal.         Behavior: Behavior normal.          Current Medications:   amLODIPine  5 mg Oral Daily     atorvastatin  20 mg Oral Daily    enoxparin  40 mg Subcutaneous Daily    gabapentin  300 mg Oral QHS    LIDOcaine  1 patch Transdermal Once    meloxicam  15 mg Oral Daily    polyethylene glycol  17 g Oral Daily         Current Facility-Administered Medications:     acetaminophen, 1,000 mg, Oral, Q8H PRN    acetaminophen, 650 mg, Oral, Q4H PRN    acetaminophen, 650 mg, Oral, Q8H PRN    aluminum-magnesium hydroxide-simethicone, 30 mL, Oral, QID PRN    bisacodyL, 10 mg, Rectal, Daily PRN    dextrose 10%, 12.5 g, Intravenous, PRN    dextrose 10%, 25 g, Intravenous, PRN    glucagon (human recombinant), 1 mg, Intramuscular, PRN    glucose, 16 g, Oral, PRN    glucose, 24 g, Oral, PRN    magnesium oxide, 800 mg, Oral, PRN    magnesium oxide, 800 mg, Oral, PRN    morphine, 10 mg, Oral, Q6H PRN    naloxone, 0.02 mg, Intravenous, PRN    ondansetron, 4 mg, Intravenous, Q6H PRN    potassium bicarbonate, 35 mEq, Oral, PRN    potassium bicarbonate, 50 mEq, Oral, PRN    potassium bicarbonate, 60 mEq, Oral, PRN    potassium, sodium phosphates, 2 packet, Oral, PRN    potassium, sodium phosphates, 2 packet, Oral, PRN    prochlorperazine, 5 mg, Intravenous, Q6H PRN    sodium chloride 0.9%, 10 mL, Intravenous, Q12H PRN    Laboratory (all labs reviewed):  CBC:  Recent Labs   Lab 02/26/24  0830 10/04/24  1028 10/05/24  2049 10/06/24  0508   WBC 3.92 3.73 L 13.67 H 10.52   Hemoglobin 15.8 15.8 14.4 14.5   Hematocrit 48.3 48.3 42.7 44.6   Platelets 292 277 230 270       CHEMISTRIES:  Recent Labs   Lab 02/26/24  0830 10/04/24  1028 10/05/24  2049 10/06/24  0508   Glucose 93 82 99 104   Sodium 139 140 142 141   Potassium 4.6 3.8 3.0 L 3.7   BUN 12 10 10 8   Creatinine 1.2 1.1 1.1 1.2   eGFR >60.0 >60 >60 >60   Calcium 9.6 9.1 7.4 L 9.0       CARDIAC BIOMARKERS:  Recent Labs   Lab 10/05/24  2049 10/05/24  2308 10/06/24  0130 10/06/24  0508    H  --   --   --    Troponin I 0.053 H 0.147 H 0.142 H 0.111 H       COAGS:         LIPIDS/LFTS:  Recent Labs   Lab 02/26/24  0830 10/04/24  1028 10/05/24  2049 10/06/24  0508   Cholesterol 229 H  --   --  208 H   Triglycerides 87  --   --  62   HDL 40  --   --  39 L   LDL Cholesterol 171.6 H  --   --  156.6   Non-HDL Cholesterol 189  --   --  169   AST 23 20 18  --    ALT 23 21 20  --        BNP:        TSH:  Recent Labs   Lab 02/26/24  0830   TSH 0.900       Free T4:  Recent Labs   Lab 02/26/24  0830   Free T4 0.82       Diagnostic Results:  ECG (personally reviewed and interpreted tracing(s)):  10/5/24 2046 SR 87    Chest X-Ray (personally reviewed and interpreted image(s)): 10/5/24 NAD    CTA Chest/Abd 10/6/24   1. No evidence of aortic aneurysm or dissection.  2. No acute intrathoracic or intra-abdominal abnormalities identified.    Echo: ordered        Assessment and Plan:     * LOC (loss of consciousness)  The patient presents after being found confused on the bathroom floor, presumed syncope and possible seizure.    The patient has a history of seizure, last seizure being 10 years ago.    EKGs normal.    Echocardiogram has been ordered.    Neuro eval pending.  Note made of elevated troponin.  Further workup as noted below.    Elevated troponin  Troponin 0.05 0.1.    No overt anginal symptoms.    EKG normal.    Echo ordered.    Pros and cons of noninvasive versus invasive ischemia evaluation discussed with the patient and his wife via cell phone.  We have decided to move forward with coronary angiogram tomorrow.    Continue statin.    Load aspirin and Plavix.  NPO after midnight.    Enoxaparin 1 milligram/kilogram b.i.d. for 2 doses.    Risks, benefits and alternatives of the catheterization procedure were discussed with the patient which include but are not limited to: bleeding, infection, death, heart attack, arrhythmia, kidney failure, stroke, need for emergency surgery, etc.  Patient understands and and agrees to proceed.  Consent was placed on the chart.        Primary  hypertension  Cont med rx    Class 1 obesity due to excess calories without serious comorbidity in adult  Body mass index is 32.36 kg/m². Morbid obesity complicates all aspects of disease management from diagnostic modalities to treatment. Weight loss encouraged and health benefits explained to patient.         Mixed hyperlipidemia  Agree with statin rx  If CAD on cath, will inc dose        VTE Risk Mitigation (From admission, onward)           Ordered     enoxaparin injection 100 mg  Every 12 hours (non-standard times)         10/06/24 0914     IP VTE HIGH RISK PATIENT  Once         10/06/24 0217     Place sequential compression device  Until discontinued         10/06/24 0217                    Thank you for your consult. I will follow-up with patient. Please contact us if you have any additional questions.    Konstantin Peres MD  Cardiology   [facility] - Telemetry

## 2024-10-06 NOTE — PLAN OF CARE
Problem: Nausea and Vomiting  Goal: Nausea and Vomiting Relief  Outcome: Progressing  Intervention: Prevent and Manage Nausea and Vomiting  Flowsheets (Taken 10/6/2024 2204)  Nausea/Vomiting Interventions: (Compazine IV PRN given) other (see comments)

## 2024-10-06 NOTE — PLAN OF CARE
Case Management Assessment     PCP: Mauro Gilmore MD   Pharmacy:   [id number] [place] [facility] [address] [place] [address]  [place] [address]  Phone: [phone] Fax: [phone]     Patient Arrived From: Home with spouse  Existing Help at Home: Melba-spouse  Barriers to Discharge: None    Discharge Plan:    A. Home with family; follow-up   B. TBD    Independent at home with spouse, who assists if needed; no assist usually needed; no current medical services; uses CPAP; no significant discharge needs anticipated at this time.       10/06/24 0749   Discharge Assessment   Assessment Type Discharge Planning Assessment   Confirmed/corrected address, phone number and insurance Yes   Confirmed Demographics Correct on Facesheet   Source of Information patient;health record   Does patient/caregiver understand observation status Yes   Communicated ADORE with patient/caregiver Date not available/Unable to determine   Reason For Admission Loss of consciousness   People in Home spouse   Facility Arrived From: Home with spouse   Do you expect to return to your current living situation? Yes   Do you have help at home or someone to help you manage your care at home? Yes   Who are your caregiver(s) and their phone number(s)? Melba-spouse: [phone]   Prior to hospitilization cognitive status: Alert/Oriented   Current cognitive status: Alert/Oriented   Walking or Climbing Stairs Difficulty no   Dressing/Bathing Difficulty no   Home Accessibility wheelchair accessible   Home Layout Able to live on 1st floor   Equipment Currently Used at Home CPAP   Readmission within 30 days? No   Patient currently being followed by outpatient case management? No   Do you currently have service(s) that help you manage your care at home? No   Do you take prescription medications? Yes   Do you have prescription coverage? Yes   Coverage    Do you have any  problems affording any of your prescribed medications? No   Is the patient taking medications as prescribed? yes   Who is going to help you get home at discharge? Melba-spouse   How do you get to doctors appointments? car, drives self   Are you on dialysis? No   Do you take coumadin? No   Discharge Plan A Home with family  (Follow-ups)   Discharge Plan B Other  (TBD)   DME Needed Upon Discharge  CPAP   Discharge Plan discussed with: Patient   Transition of Care Barriers None     SW Role explained to patient; two patient identifiers recognized; SW contact information placed on Communication board. Discussed patient managing health care at home and discussed discharge plans A and B; determined who would be helping patient at home with recovery: Melba, spouse, will help with recovery at home.

## 2024-10-06 NOTE — NURSING
[facility], [facility]  Nurses Note -- 4 Eyes      10/6/2024       Skin assessed on: Q Shift      [x] No Pressure Injuries Present    []Prevention Measures Documented    [] Yes LDA  for Pressure Injury Previously documented     [] Yes New Pressure Injury Discovered   [] LDA for New Pressure Injury Added      Attending RN:  Linnea Rosenbaum, RN     Second RN:  Edvin GREEN        Pt arrive to the unit by stretcher  accompanied by transport and wife Assisted pt to bed. Tele monitoring initiated with pulse ox continuous.  Admit assessment initiated.  Pt is AAOx4. Respirations even and unlabored.  Complained of nausea. Will give compazine PRN.

## 2024-10-06 NOTE — ASSESSMENT & PLAN NOTE
The patient presents after being found confused on the bathroom floor, presumed syncope and possible seizure.    The patient has a history of seizure, last seizure being 10 years ago.    EKGs normal.    Echocardiogram has been ordered.    Note made of elevated troponin.  Further workup as noted below.

## 2024-10-06 NOTE — ASSESSMENT & PLAN NOTE
Body mass index is 32.36 kg/m². Morbid obesity complicates all aspects of disease management from diagnostic modalities to treatment. Weight loss encouraged and health benefits explained to patient.

## 2024-10-06 NOTE — H&P
"  [facility]  History & Physical    Patient Name: Pradeep Ro  MRN: [id number]  Patient Class: OP- Observation  Admission Date: 10/5/2024  Attending Physician: Renée Ansari,*   Primary Care Provider: Mauro Gilmore MD         Patient information was obtained from patient, spouse/SO, past medical records, and ER records.     Subjective:     Principal Problem:Elevated troponin    Chief Complaint:   Chief Complaint   Patient presents with    Seizures     Pt brought in via EMS for possible, unwitnessed SE. Per EMS, pt was found in the bathtub in a post-dictal state by wife with blood coming from mouth and nose. Pt reports he had a SE 10 years ago but was never placed on any medication and has not had one since. Pt reports mouth and nose pain at this time, alert and oriented but reports feeling "cloudy." Pt reports Hx of HTN and reports compliance with medication.         HPI: 34 y.o. male with PMH of Hypertension and back pain presents to the ED for evaluation of altered mental status and fall.    Patient reports he went to the bathroom to have a bowel movement then does not remember what happened.  His wife found him a couple hours later when she came home on the bathroom floor with blood coming from his mouth and nose.    The patient was confused when he woke up but able to stand up and walk.  He arrived to the ED confused. Subsequently patient retunred to his baseline but afterwards reported back pain with radiates to the middle of his shoulder blades, feels like heaviness and radiates to his shoulders.  Patient attempted Tylenol 1,000 mg and Meloxicam at 6:25 am this morning as treatment/medication with slight relief. No other alleviating or exacerbating factors.Patient does not have family hx of MI but reports maybe that his father had heart issues. He does not have a presonal hx of similar chest pain with exertion and he is able to lift weight or go up and down stairs " without chest pain.   Denies history of kidney stones. Denies tobacco use. Denies chest pain, SOB, leg swelling, abdominal pain, dysuria, frequency, or other associated symptoms.    In the ED he was found to have leucocytosis, hypokalemia, acidosis, hypocalcimia and troponin leak.  CT Angio negative for PE. His Hip, Shoulder and CXR were negative for acute pathology  CT Head and maxillofacial bones did not identify any fractures    Patient is admitted for ACS rule out      Past Medical History:   Diagnosis Date    Hyperlipidemia, unspecified 02/21/2024    JUAN RAMON (obstructive sleep apnea) 02/24/2024       History reviewed. No pertinent surgical history.    Review of patient's allergies indicates:  No Known Allergies    No current facility-administered medications on file prior to encounter.     Current Outpatient Medications on File Prior to Encounter   Medication Sig    acetaminophen (TYLENOL) 500 MG tablet Take 1-2 tablets (500-1,000 mg total) by mouth 3 (three) times daily as needed for Pain.    amLODIPine (NORVASC) 5 MG tablet Take 1 tablet (5 mg total) by mouth once daily.    atorvastatin (LIPITOR) 20 MG tablet Take with food/milk.Take or use exactly as directed.Obtain advice for OTCs.Do not take if pregnant.Avoid grapefruit and grapefruit juice.    gabapentin (NEURONTIN) 300 MG capsule Take 1 capsule (300 mg total) by mouth every evening. In 1 wk, if no relief, increase to twice daily.In another wk, may increase to 3 times.    meloxicam (MOBIC) 15 MG tablet Take 1 tablet (15 mg total) by mouth once daily.    orphenadrine (NORFLEX) 100 mg tablet Take 1 tablet (100 mg total) by mouth every 12 (twelve) hours as needed.     Family History       Problem Relation (Age of Onset)    Heart disease Father, Paternal Grandfather    Hyperlipidemia Father, Paternal Grandfather          Tobacco Use    Smoking status: Never    Smokeless tobacco: Never   Substance and Sexual Activity    Alcohol use: Yes     Alcohol/week: 6.0  standard drinks of alcohol     Types: 3 Shots of liquor, 3 Drinks containing 0.5 oz of alcohol per week    Drug use: No    Sexual activity: Yes     Partners: Female     Birth control/protection: OCP     Review of Systems  Objective:     Vital Signs (Most Recent):  Temp: 98 °F (36.7 °C) (10/06/24 0213)  Pulse: 76 (10/06/24 0325)  Resp: 19 (10/06/24 0213)  BP: (!) 157/97 (10/06/24 0213)  SpO2: 97 % (10/06/24 0213) Vital Signs (24h Range):  Temp:  [97.9 °F (36.6 °C)-98.4 °F (36.9 °C)] 98 °F (36.7 °C)  Pulse:  [76-89] 76  Resp:  [14-20] 19  SpO2:  [96 %-99 %] 97 %  BP: (135-159)/(79-97) 157/97     Weight: 99.4 kg (219 lb 2.2 oz)  Body mass index is 32.36 kg/m².     Physical Exam  Vitals reviewed.   Constitutional:       General: He is not in acute distress.     Appearance: He is well-developed. He is not diaphoretic.   HENT:      Head: Normocephalic and atraumatic.   Eyes:      General: No scleral icterus.     Pupils: Pupils are equal, round, and reactive to light.   Neck:      Thyroid: No thyromegaly.   Cardiovascular:      Rate and Rhythm: Normal rate and regular rhythm.      Heart sounds: No murmur heard.  Pulmonary:      Effort: Pulmonary effort is normal.      Breath sounds: Normal breath sounds. No stridor. No wheezing or rales.   Abdominal:      General: There is no distension.      Palpations: Abdomen is soft.   Musculoskeletal:         General: No deformity. Normal range of motion.      Cervical back: Normal range of motion.   Skin:     General: Skin is warm.   Neurological:      Mental Status: He is alert and oriented to person, place, and time.   Psychiatric:         Behavior: Behavior normal.              CRANIAL NERVES     CN III, IV, VI   Pupils are equal, round, and reactive to light.       Significant Labs: All pertinent labs within the past 24 hours have been reviewed.    Significant Imaging: I have reviewed all pertinent imaging results/findings within the past 24 hours.  Assessment/Plan:     *  Elevated troponin  Patient had trops checked due to compliant of back pain concerning of atypical anginal symptoms. Patient reports good exercise capacity and no prior episodes of exertional chest pain  Trops peaked  Plan  - Will obtain Echo in AM and Stress test   - Consult cardiology in AM      Acute midline thoracic back pain  Patient reports acute midline back pain predominantly between the shoulder blades radiating to lower neck  Likely musculoskeletal in etiology   Imaging does not reveal any fractures  Plan:  PRN tylenol      Primary hypertension  Patients blood pressure range in the last 24 hours was: BP  Min: 135/80  Max: 168/77.The patient's inpatient anti-hypertensive regimen is listed below:  Current Antihypertensives   Amlodipine 5mg daily    Plan  - BP is uncontrolled, restart home amlodipine        VTE Risk Mitigation (From admission, onward)           Ordered     enoxaparin injection 40 mg  Daily         10/06/24 0217     IP VTE HIGH RISK PATIENT  Once         10/06/24 0217     Place sequential compression device  Until discontinued         10/06/24 0217                       On 10/06/2024, patient should be placed in hospital observation services under my care.             Nurys Burnett MD  Department of Hospital Medicine  [facility] [place] - Telemetry

## 2024-10-06 NOTE — CARE UPDATE
Patient seen and examined he was placed in observation for of syncopal versus seizure episode and elevated troponin.  He was found down by his wife in the restroom.  He reports he currently feels well but reports right-sided posterior neck pain with radiation down towards the scapula.  He was wife reports he was found lying in the bathroom on his side.  He was ambulatory he denies any numbness weakness of extremity incontinence.  He was seen by Cardiology with plans for coronary angiogram on October 7th.  Neurology was consulted an EEG was ordered.  He was on aspirin statin and Lovenox with Plavix.  We will check CT cervical spine given complaint of neck pain and being found down.    Sky Magallanes PA-C  Department of Hospital Medicine   [facility]

## 2024-10-06 NOTE — ASSESSMENT & PLAN NOTE
Patient had trops checked due to compliant of back pain concerning of atypical anginal symptoms. Patient reports good exercise capacity and no prior episodes of exertional chest pain  Trops peaked  Plan  - Will obtain Echo in AM and Stress test   - Consult cardiology in AM

## 2024-10-06 NOTE — ASSESSMENT & PLAN NOTE
Patient reports acute midline back pain predominantly between the shoulder blades radiating to lower neck  Likely musculoskeletal in etiology   Imaging does not reveal any fractures  Plan:  PRN tylenol

## 2024-10-06 NOTE — ED PROVIDER NOTES
"Encounter Date: 10/5/2024       History     Chief Complaint   Patient presents with    Seizures     Pt brought in via EMS for possible, unwitnessed SE. Per EMS, pt was found in the bathtub in a post-dictal state by wife with blood coming from mouth and nose. Pt reports he had a SE 10 years ago but was never placed on any medication and has not had one since. Pt reports mouth and nose pain at this time, alert and oriented but reports feeling "cloudy." Pt reports Hx of HTN and reports compliance with medication.      Mr. Ro is a 33 y/o male with HLD, HTN, and JUAN RAMON presents to [facility] for evaluation of altered mental status. Patient reports he went to the bathroom to have a bowel movement then does not remember what happened. His wife found him a couple hours later when she came home on the bathroom floor with blood coming from his mouth and nose. The patient was confused when he woke up but able to stand up and walk. He states he had one prior episode that was similar in 2015 and was thought to be a seizure but he has never been started on AEDs. He complains of right anterior neck pain near the SCM and upper back pain. He reports a generalized headache and feeling groggy. He denies vision changes, weakness, sensory changes, n/v/d, chest pain, dyspnea, abdominal pain, diarrhea, edema, blood in his stool, or vertigo/dizziness.     Patient initially denied any pain in upper or lower extremities but on re-evaluation, when he was less confused he complained of right shoulder pain and left hip pain.    The history is provided by the patient, medical records, the spouse and the EMS personnel.     Review of patient's allergies indicates:  No Known Allergies  Past Medical History:   Diagnosis Date    Hyperlipidemia, unspecified 02/21/2024    JUAN RAMON (obstructive sleep apnea) 02/24/2024    Primary hypertension 10/6/2024     History reviewed. No pertinent surgical history.  Family History   Problem " Relation Name Age of Onset    Heart disease Father      Hyperlipidemia Father      Heart disease Paternal Grandfather      Hyperlipidemia Paternal Grandfather       Social History     Tobacco Use    Smoking status: Never    Smokeless tobacco: Never   Substance Use Topics    Alcohol use: Yes     Alcohol/week: 6.0 standard drinks of alcohol     Types: 3 Shots of liquor, 3 Drinks containing 0.5 oz of alcohol per week    Drug use: No     Review of Systems    Physical Exam     Initial Vitals [10/05/24 2015]   BP Pulse Resp Temp SpO2   (!) 143/91 87 18 97.9 °F (36.6 °C) 99 %      MAP       --         Physical Exam    Nursing note and vitals reviewed.  Constitutional: He appears well-developed and well-nourished. He is cooperative. No distress.   HENT:   Head: Normocephalic. Mouth/Throat: Oropharynx is clear and moist.   Dried blood noted over lips.  Missing left lateral lower teeth (incisor and canine) which patient states is old.  Slight tenderness over lower central incisors, no tenderness over alveolar ridge, no subluxation.  Mild chips over top front 2 teeth which patient states are old.  No other oral trauma noted to tongue or oropharynx.    No nasal septal hematoma.  No blood noted in the nares.    No tenderness over nasal bones, maxilla, or frontal bones.  No trismus or tenderness over the mandible.   Eyes: Conjunctivae and EOM are normal. Pupils are equal, round, and reactive to light. No scleral icterus.   Neck: Neck supple.   Normal range of motion.  Cardiovascular:  Normal rate, regular rhythm, normal heart sounds and intact distal pulses.           No murmur heard.  Pulmonary/Chest: Breath sounds normal. No stridor. No respiratory distress. He exhibits no tenderness.   Abdominal: Abdomen is soft. He exhibits no distension. There is no abdominal tenderness.   Musculoskeletal:         General: No edema.      Cervical back: Normal range of motion and neck supple.      Comments: No C/T/L-spine tenderness to  palpation.  Full range of motion in shoulders and hips.  No tenderness over bilateral clavicles or chest wall.     Neurological: He is alert. He has normal strength. No cranial nerve deficit or sensory deficit. GCS eye subscore is 4. GCS verbal subscore is 4. GCS motor subscore is 6.   On initial exam patient is confused but answers most questions appropriately.  He is oriented to his name, birth date, and can state the president.  When asked the year he says 2023 and he can not think of the month.  Does not recall being in the ED yesterday.  Normal gait.  No dysarthria.  Normal coordination.   Skin: Skin is warm and dry. No rash noted.         ED Course   Procedures  Labs Reviewed   TROPONIN I - Abnormal       Result Value    Troponin I 0.053 (*)    COMPREHENSIVE METABOLIC PANEL - Abnormal    Sodium 142      Potassium 3.0 (*)     Chloride 112 (*)     CO2 20 (*)     Glucose 99      BUN 10      Creatinine 1.1      Calcium 7.4 (*)     Total Protein 6.2      Albumin 3.4 (*)     Total Bilirubin 0.3      Alkaline Phosphatase 41 (*)     AST 18      ALT 20      eGFR >60      Anion Gap 10     CBC W/ AUTO DIFFERENTIAL - Abnormal    WBC 13.67 (*)     RBC 4.76      Hemoglobin 14.4      Hematocrit 42.7      MCV 90      MCH 30.3      MCHC 33.7      RDW 13.1      Platelets 230      MPV 8.7 (*)     Immature Granulocytes 0.4      Gran # (ANC) 11.8 (*)     Immature Grans (Abs) 0.05 (*)     Lymph # 1.0      Mono # 0.8      Eos # 0.0      Baso # 0.02      nRBC 0      Gran % 86.1 (*)     Lymph % 7.5 (*)     Mono % 5.8      Eosinophil % 0.1      Basophil % 0.1      Differential Method Automated     CK - Abnormal     (*)    TROPONIN I - Abnormal    Troponin I 0.147 (*)    LACTIC ACID, PLASMA    Lactate (Lactic Acid) 1.9     DRUG SCREEN PANEL, URINE EMERGENCY    Benzodiazepines Negative      Methadone metabolites Negative      Cocaine (Metab.) Negative      Opiate Scrn, Ur Negative      Barbiturate Screen, Ur Negative       Amphetamine Screen, Ur Negative      THC Negative      Phencyclidine Negative      Creatinine, Urine 66.0      Toxicology Information SEE COMMENT      Narrative:     Specimen Source->Urine   ALCOHOL,MEDICAL (ETHANOL)   ALCOHOL,MEDICAL (ETHANOL)    Alcohol, Serum <10       EKG Readings: (Independently Interpreted)   Initial Reading: No STEMI. Previous EKG: Compared with most recent EKG Rhythm: Normal Sinus Rhythm. Heart Rate: 83. Ectopy: No Ectopy. Conduction: Normal. ST Segments: Normal ST Segments. T Waves: Normal.       Imaging Results              CTA Chest Abdomen Pelvis (Final result)  Result time 10/06/24 01:01:08      Final result by Elton Odonnell MD (10/06/24 01:01:08)                   Impression:      1. No evidence of aortic aneurysm or dissection.  2. No acute intrathoracic or intra-abdominal abnormalities identified.      Electronically signed by: Elton Odonnell MD  Date:    10/06/2024  Time:    01:01               Narrative:    EXAMINATION:  CTA CHEST ABDOMEN PELVIS    CLINICAL HISTORY:  Aortic dissection suspected;trauma with question of seizure, elevated troponin;    TECHNIQUE:  CTA chest abdomen and pelvis was obtained following administration of 100 cc Omnipaque 350 IV contrast.  Sagittal and coronal reformats were obtained.    COMPARISON:  CT abdomen and pelvis from yesterday 10/04/2024.    FINDINGS:  No evidence of aortic aneurysm or dissection.  Branch vessels of the aortic arch and abdominal aortic branch vessels are widely patent.  Bilateral iliacs are patent.  Incidental note is made of retroaortic coursing left renal vein.    Heart is normal in size with no pericardial effusion.  No evidence of PE.  No significant abnormalities are seen along the esophageal course.  Major airways are patent.  Lungs are clear and symmetrically expanded.  No evidence of consolidation, pneumothorax, or pleural effusion.    No significant hepatic abnormalities are identified.  There is no intra-or  extrahepatic biliary ductal dilatation.  The gallbladder is unremarkable.  The stomach, pancreas, spleen, and adrenal glands are unremarkable.    Kidneys enhance normally with no evidence of hydronephrosis.  No abnormalities are seen along the ureteral courses.  Urinary bladder and prostate are unremarkable.    Appendix is visualized and is unremarkable.  The visualized loops of small and large bowel show no evidence of obstruction or inflammation.  No free air or free fluid.    No acute osseous abnormality identified. Subcutaneous soft tissues show no significant abnormalities.                                       X-Ray Shoulder Trauma Right (Final result)  Result time 10/05/24 22:34:44      Final result by Elton Odonnell MD (10/05/24 22:34:44)                   Impression:      No acute osseous abnormality identified.      Electronically signed by: Elton Odonnell MD  Date:    10/05/2024  Time:    22:34               Narrative:    EXAMINATION:  XR SHOULDER TRAUMA 3 VIEW RIGHT    CLINICAL HISTORY:  Pain in unspecified shoulder    TECHNIQUE:  Three views of the right shoulder were performed.    COMPARISON:  None    FINDINGS:  No evidence of acute displaced fracture, dislocation, or osseous destructive process.                                       X-Ray Hip 2 or 3 views Left with Pelvis when performed (Final result)  Result time 10/05/24 22:35:42      Final result by Elton Odonnell MD (10/05/24 22:35:42)                   Impression:      No acute osseous abnormality identified.      Electronically signed by: Elton Odonnell MD  Date:    10/05/2024  Time:    22:35               Narrative:    EXAMINATION:  XR HIP WITH PELVIS WHEN PERFORMED 2 OR 3 VIEWS LEFT    CLINICAL HISTORY:  Pain in unspecified hip    TECHNIQUE:  AP view of the pelvis and frog leg lateral view of the left hip were performed.    COMPARISON:  None    FINDINGS:  No evidence of acute displaced fracture, dislocation, or osseous destructive  process.  Hip joint spaces appear fairly well maintained.                                       X-Ray Chest 1 View (Final result)  Result time 10/05/24 21:45:19      Final result by Elton Odonnell MD (10/05/24 21:45:19)                   Impression:      No acute cardiopulmonary process identified.      Electronically signed by: Elton Odonnell MD  Date:    10/05/2024  Time:    21:45               Narrative:    EXAMINATION:  XR CHEST 1 VIEW    CLINICAL HISTORY:  Injury, unspecified, initial encounter    TECHNIQUE:  Single frontal view of the chest was performed.    COMPARISON:  10/04/2024.    FINDINGS:  Cardiac silhouette is normal in size.  Lungs are symmetrically expanded.  No evidence of focal consolidative process, pneumothorax, or significant pleural effusion.  No acute osseous abnormality identified.                                       CT Head Without Contrast (Final result)  Result time 10/05/24 21:33:28      Final result by Elton Odonnell MD (10/05/24 21:33:28)                   Impression:      No acute intracranial abnormalities identified.    No acute facial fractures identified.      Electronically signed by: Elton Odonnell MD  Date:    10/05/2024  Time:    21:33               Narrative:    EXAMINATION:  CT HEAD WITHOUT CONTRAST; CT MAXILLOFACIAL WITHOUT CONTRAST    CLINICAL HISTORY:  Mental status change, unknown cause;; Facial trauma, blunt;    TECHNIQUE:  Low dose axial images were obtained through the head and maxillofacial region.  Coronal and sagittal reformations were also performed. Contrast was not administered.    COMPARISON:  CT head from February 2015.    FINDINGS:  No evidence of acute/recent major vascular distribution cerebral infarction, intraparenchymal hemorrhage, or intra-axial space occupying lesion. The ventricular system is normal in size and with cavum septum pellucidum noted.  No effacement of the skull-base cisterns. No abnormal extra-axial fluid collections or blood  products.    No acute facial fractures are identified.  Orbits show no acute abnormalities.  Visualized paranasal sinuses and mastoid air cells are clear. The calvarium shows no significant abnormality.  Visualized upper cervical spine shows no acute abnormalities.                                       CT Maxillofacial Without Contrast (Final result)  Result time 10/05/24 21:33:28      Final result by Elton Odonnell MD (10/05/24 21:33:28)                   Impression:      No acute intracranial abnormalities identified.    No acute facial fractures identified.      Electronically signed by: Elton Odonnell MD  Date:    10/05/2024  Time:    21:33               Narrative:    EXAMINATION:  CT HEAD WITHOUT CONTRAST; CT MAXILLOFACIAL WITHOUT CONTRAST    CLINICAL HISTORY:  Mental status change, unknown cause;; Facial trauma, blunt;    TECHNIQUE:  Low dose axial images were obtained through the head and maxillofacial region.  Coronal and sagittal reformations were also performed. Contrast was not administered.    COMPARISON:  CT head from February 2015.    FINDINGS:  No evidence of acute/recent major vascular distribution cerebral infarction, intraparenchymal hemorrhage, or intra-axial space occupying lesion. The ventricular system is normal in size and with cavum septum pellucidum noted.  No effacement of the skull-base cisterns. No abnormal extra-axial fluid collections or blood products.    No acute facial fractures are identified.  Orbits show no acute abnormalities.  Visualized paranasal sinuses and mastoid air cells are clear. The calvarium shows no significant abnormality.  Visualized upper cervical spine shows no acute abnormalities.                                    X-Rays:   Independently Interpreted Readings:   Chest X-Ray: No acute abnormalities.  No infiltrates.  Normal heart size.   Head CT: No hemorrhage.  No skull fracture.     Medications   LIDOcaine 5 % patch 1 patch (1 patch Transdermal Patch Applied  10/5/24 4151)   sodium chloride 0.9% flush 10 mL (has no administration in time range)   naloxone 0.4 mg/mL injection 0.02 mg (has no administration in time range)   glucose chewable tablet 16 g (has no administration in time range)   glucose chewable tablet 24 g (has no administration in time range)   glucagon (human recombinant) injection 1 mg (has no administration in time range)   enoxaparin injection 40 mg (has no administration in time range)   potassium bicarbonate disintegrating tablet 50 mEq (has no administration in time range)   potassium bicarbonate disintegrating tablet 60 mEq (has no administration in time range)   potassium bicarbonate disintegrating tablet 35 mEq (has no administration in time range)   magnesium oxide tablet 800 mg (has no administration in time range)   magnesium oxide tablet 800 mg (has no administration in time range)   potassium, sodium phosphates 280-160-250 mg packet 2 packet (has no administration in time range)   potassium, sodium phosphates 280-160-250 mg packet 2 packet (has no administration in time range)   acetaminophen tablet 650 mg (has no administration in time range)   acetaminophen tablet 1,000 mg (has no administration in time range)   morphine 10 mg/5 mL solution 10 mg (has no administration in time range)   polyethylene glycol packet 17 g (has no administration in time range)   bisacodyL suppository 10 mg (has no administration in time range)   acetaminophen tablet 650 mg (has no administration in time range)   aluminum-magnesium hydroxide-simethicone 200-200-20 mg/5 mL suspension 30 mL (has no administration in time range)   dextrose 10% bolus 125 mL 125 mL (has no administration in time range)   dextrose 10% bolus 250 mL 250 mL (has no administration in time range)   ondansetron injection 4 mg (has no administration in time range)   prochlorperazine injection Soln 5 mg (5 mg Intravenous Given 10/6/24 4546)   amLODIPine tablet 5 mg (has no administration in time  range)   atorvastatin tablet 20 mg (has no administration in time range)   gabapentin capsule 300 mg (has no administration in time range)   meloxicam tablet 15 mg (has no administration in time range)   potassium bicarbonate disintegrating tablet 40 mEq (40 mEq Oral Given 10/5/24 2205)   ondansetron injection 4 mg (4 mg Intravenous Given 10/5/24 2206)   ketorolac injection 15 mg (15 mg Intravenous Given 10/5/24 2206)   lactated ringers bolus 2,000 mL (0 mLs Intravenous Stopped 10/5/24 2305)   methocarbamoL tablet 500 mg (500 mg Oral Given 10/5/24 2337)   aspirin tablet 325 mg (325 mg Oral Given 10/6/24 0153)   iohexoL (OMNIPAQUE 350) injection 100 mL (100 mLs Intravenous Given 10/6/24 0027)   HYDROcodone-acetaminophen 5-325 mg per tablet 1 tablet (1 tablet Oral Given 10/6/24 0119)   ondansetron injection 4 mg (4 mg Intravenous Given 10/6/24 0120)     Medical Decision Making  34-year-old male presents to the ED for evaluation of syncopal events/altered mental status.    Patient is afebrile, hemodynamically stable, and in no acute distress on arrival.  He has dried blood over his lips in mild tenderness over his lower teeth but no other trauma noted.  He was initially confused but has no facial droop, weakness, or sensory changes.     Differential diagnoses considered include, but not limited to:  Seizure, rhabdomyolysis, electrolyte abnormality, MARGARETTE, toxic metabolic derangement, drug or alcohol intoxication, intracranial hemorrhage, CVA, concussion, ACS, aortic dissection/aneurysm, arrhythmia        He arrived to the ED confused. He was seen yesterday in the ED for right flank pain but when questioned about this he did not initially remember it. Initially was oriented to name but not year. He reports feeling like he bit his tongue but does not think he had any bowel or bladder incontinence.     He has returned to baseline. I talked with neurology in anticipation that the patient may go home, and they recommended  follow up in clinic but no initiation of AEDs given his last one was so remote and its an unclear history. However, his troponin was elevated and tripled on repeat despite 2L IVF. He has no active chest pain. CTA obtained given the trauma and recent back pain, this was negative.     Aside from elevated troponin and CPK, his EKG, labs, and imaging are otherwise unremarkable. Given rising troponin elevated and this 34-year-old, will admit for NSTEMI, troponin trending and observation.  Discussed all findings and plan with the patient and his wife at bedside who expressed understanding and agreement.  Discussed with Hospital Medicine who accepted the patient for further evaluation and management.    Amount and/or Complexity of Data Reviewed  Labs: ordered. Decision-making details documented in ED Course.  Radiology: ordered and independent interpretation performed. Decision-making details documented in ED Course.  ECG/medicine tests: ordered and independent interpretation performed. Decision-making details documented in ED Course.    Risk  OTC drugs.  Prescription drug management.  Decision regarding hospitalization.               ED Course as of 10/06/24 0442   Sat Oct 05, 2024   2144 Troponin I(!): 0.053 [OW]   2144 Lactic Acid Level: 1.9 [OW]   2144 CPK(!): 362 [OW]   2144 WBC(!): 13.67 [OW]   2144 Hemoglobin: 14.4 [OW]   2144 Impression:  No acute intracranial abnormalities identified.  No acute facial fractures identified. [OW]      ED Course User Index  [OW] Valerie Galdamez MD                           Clinical Impression:  Final diagnoses:  [R40.20] Loss of consciousness  [T14.90XA] Trauma  [M25.519] Shoulder pain  [M25.559] Hip pain  [I21.4] NSTEMI (non-ST elevated myocardial infarction)          ED Disposition Condition    Observation                 Valerie Galdamez MD  Resident  10/06/24 7566

## 2024-10-06 NOTE — PROGRESS NOTES
[facility] [place]  Nurses Note -- 4 Eyes      10/6/2024       Skin assessed on: Q Shift      [x] No Pressure Injuries Present    []Prevention Measures Documented    [] Yes LDA  for Pressure Injury Previously documented     [] Yes New Pressure Injury Discovered   [] LDA for New Pressure Injury Added      Attending RN:  Jaycob Perez RN     Second RN:  Linnea Rosenbaum RN

## 2024-10-06 NOTE — ASSESSMENT & PLAN NOTE
Troponin 0.05 0.1.    No overt anginal symptoms.    EKG normal.    Echo ordered.    Pros and cons of noninvasive versus invasive ischemia evaluation discussed with the patient and his wife via cell phone.  We have decided to move forward with coronary angiogram tomorrow.    Continue statin.    Load aspirin and Plavix.  NPO after midnight.    Enoxaparin 1 milligram/kilogram b.i.d. for 2 doses.    Risks, benefits and alternatives of the catheterization procedure were discussed with the patient which include but are not limited to: bleeding, infection, death, heart attack, arrhythmia, kidney failure, stroke, need for emergency surgery, etc.  Patient understands and and agrees to proceed.  Consent was placed on the chart.

## 2024-10-06 NOTE — HPI
34 y.o. male with PMH of Hypertension and back pain presents to the ED for evaluation of altered mental status and fall.  Patient reports he went to the bathroom to have a bowel movement then does not remember what happened.  His wife found him a couple hours later when she came home on the bathroom floor with blood coming from his mouth and nose.    The patient was confused when he woke up but able to stand up and walk.  He arrived to the ED confused. Subsequently patient retunred to his baseline but afterwards reported back pain with radiates to the middle of his shoulder blades, feels like heaviness and radiates to his shoulders.  Patient attempted Tylenol 1,000 mg and Meloxicam at 6:25 am this morning as treatment/medication with slight relief. No other alleviating or exacerbating factors.Patient does not have family hx of MI but reports maybe that his father had heart issues. He does not have a presonal hx of similar chest pain with exertion and he is able to lift weight or go up and down stairs without chest pain. Denies history of kidney stones. Denies tobacco use. Denies chest pain, SOB, leg swelling, abdominal pain, dysuria, frequency, or other associated symptoms.    Cardiology consulted for ?NSTEMI/CP.    The patient is a very pleasant 34-year-old man who is admitted for evaluation loss of consciousness.  I also discussed the case with his wife.  She found him on the floor in the bathroom with quite a bit of blood coming out of his nose and mouth.  He was waking up at the time, but was quite confused for approximately one hour.  His mentation is now back to baseline.  The patient reports a prior history of seizure 10 years ago but none in the interim.  He denies any angina, dyspnea, palpitations, or syncope.  He has had no cardiac history.  There was no family history of premature CAD.  He is not a smoker.  His EKGs normal.  Echocardiogram has been ordered.  Note is made of a troponin of 0.05 which has  risen to 0.1.  We discussed the pros and cons of noninvasive versus invasive ischemia evaluation we decided to move forward with coronary angiogram.

## 2024-10-06 NOTE — SUBJECTIVE & OBJECTIVE
Past Medical History:   Diagnosis Date    Hyperlipidemia, unspecified 02/21/2024    JUAN RAMON (obstructive sleep apnea) 02/24/2024       History reviewed. No pertinent surgical history.    Review of patient's allergies indicates:  No Known Allergies    No current facility-administered medications on file prior to encounter.     Current Outpatient Medications on File Prior to Encounter   Medication Sig    acetaminophen (TYLENOL) 500 MG tablet Take 1-2 tablets (500-1,000 mg total) by mouth 3 (three) times daily as needed for Pain.    amLODIPine (NORVASC) 5 MG tablet Take 1 tablet (5 mg total) by mouth once daily.    atorvastatin (LIPITOR) 20 MG tablet Take with food/milk.Take or use exactly as directed.Obtain advice for OTCs.Do not take if pregnant.Avoid grapefruit and grapefruit juice.    gabapentin (NEURONTIN) 300 MG capsule Take 1 capsule (300 mg total) by mouth every evening. In 1 wk, if no relief, increase to twice daily.In another wk, may increase to 3 times.    meloxicam (MOBIC) 15 MG tablet Take 1 tablet (15 mg total) by mouth once daily.    orphenadrine (NORFLEX) 100 mg tablet Take 1 tablet (100 mg total) by mouth every 12 (twelve) hours as needed.     Family History       Problem Relation (Age of Onset)    Heart disease Father, Paternal Grandfather    Hyperlipidemia Father, Paternal Grandfather          Tobacco Use    Smoking status: Never    Smokeless tobacco: Never   Substance and Sexual Activity    Alcohol use: Yes     Alcohol/week: 6.0 standard drinks of alcohol     Types: 3 Shots of liquor, 3 Drinks containing 0.5 oz of alcohol per week    Drug use: No    Sexual activity: Yes     Partners: Female     Birth control/protection: OCP     Review of Systems  Objective:     Vital Signs (Most Recent):  Temp: 98 °F (36.7 °C) (10/06/24 0213)  Pulse: 76 (10/06/24 0325)  Resp: 19 (10/06/24 0213)  BP: (!) 157/97 (10/06/24 0213)  SpO2: 97 % (10/06/24 0213) Vital Signs (24h Range):  Temp:  [97.9 °F (36.6 °C)-98.4 °F (36.9  °C)] 98 °F (36.7 °C)  Pulse:  [76-89] 76  Resp:  [14-20] 19  SpO2:  [96 %-99 %] 97 %  BP: (135-159)/(79-97) 157/97     Weight: 99.4 kg (219 lb 2.2 oz)  Body mass index is 32.36 kg/m².     Physical Exam  Vitals reviewed.   Constitutional:       General: He is not in acute distress.     Appearance: He is well-developed. He is not diaphoretic.   HENT:      Head: Normocephalic and atraumatic.   Eyes:      General: No scleral icterus.     Pupils: Pupils are equal, round, and reactive to light.   Neck:      Thyroid: No thyromegaly.   Cardiovascular:      Rate and Rhythm: Normal rate and regular rhythm.      Heart sounds: No murmur heard.  Pulmonary:      Effort: Pulmonary effort is normal.      Breath sounds: Normal breath sounds. No stridor. No wheezing or rales.   Abdominal:      General: There is no distension.      Palpations: Abdomen is soft.   Musculoskeletal:         General: No deformity. Normal range of motion.      Cervical back: Normal range of motion.   Skin:     General: Skin is warm.   Neurological:      Mental Status: He is alert and oriented to person, place, and time.   Psychiatric:         Behavior: Behavior normal.              CRANIAL NERVES     CN III, IV, VI   Pupils are equal, round, and reactive to light.       Significant Labs: All pertinent labs within the past 24 hours have been reviewed.    Significant Imaging: I have reviewed all pertinent imaging results/findings within the past 24 hours.

## 2024-10-07 LAB
ANION GAP SERPL CALC-SCNC: 10 MMOL/L (ref 8–16)
BASOPHILS # BLD AUTO: 0.02 K/UL (ref 0–0.2)
BASOPHILS NFR BLD: 0.3 % (ref 0–1.9)
BUN SERPL-MCNC: 9 MG/DL (ref 6–20)
CALCIUM SERPL-MCNC: 8.8 MG/DL (ref 8.7–10.5)
CHLORIDE SERPL-SCNC: 103 MMOL/L (ref 95–110)
CO2 SERPL-SCNC: 27 MMOL/L (ref 23–29)
CREAT SERPL-MCNC: 1.2 MG/DL (ref 0.5–1.4)
DIFFERENTIAL METHOD BLD: ABNORMAL
EOSINOPHIL # BLD AUTO: 0.2 K/UL (ref 0–0.5)
EOSINOPHIL NFR BLD: 3.4 % (ref 0–8)
ERYTHROCYTE [DISTWIDTH] IN BLOOD BY AUTOMATED COUNT: 13.2 % (ref 11.5–14.5)
EST. GFR  (NO RACE VARIABLE): >60 ML/MIN/1.73 M^2
GLUCOSE SERPL-MCNC: 88 MG/DL (ref 70–110)
HCT VFR BLD AUTO: 43.8 % (ref 40–54)
HGB BLD-MCNC: 14 G/DL (ref 14–18)
IMM GRANULOCYTES # BLD AUTO: 0.01 K/UL (ref 0–0.04)
IMM GRANULOCYTES NFR BLD AUTO: 0.2 % (ref 0–0.5)
LYMPHOCYTES # BLD AUTO: 2.1 K/UL (ref 1–4.8)
LYMPHOCYTES NFR BLD: 36.1 % (ref 18–48)
MCH RBC QN AUTO: 29.2 PG (ref 27–31)
MCHC RBC AUTO-ENTMCNC: 32 G/DL (ref 32–36)
MCV RBC AUTO: 91 FL (ref 82–98)
MONOCYTES # BLD AUTO: 0.4 K/UL (ref 0.3–1)
MONOCYTES NFR BLD: 7.5 % (ref 4–15)
NEUTROPHILS # BLD AUTO: 3.1 K/UL (ref 1.8–7.7)
NEUTROPHILS NFR BLD: 52.5 % (ref 38–73)
NRBC BLD-RTO: 0 /100 WBC
OHS QRS DURATION: 84 MS
OHS QRS DURATION: 90 MS
OHS QTC CALCULATION: 415 MS
OHS QTC CALCULATION: 442 MS
PLATELET # BLD AUTO: 259 K/UL (ref 150–450)
PMV BLD AUTO: 8.8 FL (ref 9.2–12.9)
POTASSIUM SERPL-SCNC: 3.9 MMOL/L (ref 3.5–5.1)
RBC # BLD AUTO: 4.8 M/UL (ref 4.6–6.2)
SODIUM SERPL-SCNC: 140 MMOL/L (ref 136–145)
WBC # BLD AUTO: 5.87 K/UL (ref 3.9–12.7)

## 2024-10-07 PROCEDURE — 25500020 PHARM REV CODE 255: Performed by: STUDENT IN AN ORGANIZED HEALTH CARE EDUCATION/TRAINING PROGRAM

## 2024-10-07 PROCEDURE — 25000003 PHARM REV CODE 250: Performed by: INTERNAL MEDICINE

## 2024-10-07 PROCEDURE — B2111ZZ FLUOROSCOPY OF MULTIPLE CORONARY ARTERIES USING LOW OSMOLAR CONTRAST: ICD-10-PCS | Performed by: INTERNAL MEDICINE

## 2024-10-07 PROCEDURE — 63600175 PHARM REV CODE 636 W HCPCS: Performed by: INTERNAL MEDICINE

## 2024-10-07 PROCEDURE — 25500020 PHARM REV CODE 255: Performed by: INTERNAL MEDICINE

## 2024-10-07 PROCEDURE — 94761 N-INVAS EAR/PLS OXIMETRY MLT: CPT

## 2024-10-07 PROCEDURE — 4A023N7 MEASUREMENT OF CARDIAC SAMPLING AND PRESSURE, LEFT HEART, PERCUTANEOUS APPROACH: ICD-10-PCS | Performed by: INTERNAL MEDICINE

## 2024-10-07 PROCEDURE — C1887 CATHETER, GUIDING: HCPCS | Performed by: INTERNAL MEDICINE

## 2024-10-07 PROCEDURE — 36415 COLL VENOUS BLD VENIPUNCTURE: CPT | Performed by: STUDENT IN AN ORGANIZED HEALTH CARE EDUCATION/TRAINING PROGRAM

## 2024-10-07 PROCEDURE — 96374 THER/PROPH/DIAG INJ IV PUSH: CPT | Mod: 59

## 2024-10-07 PROCEDURE — 63600175 PHARM REV CODE 636 W HCPCS

## 2024-10-07 PROCEDURE — 85025 COMPLETE CBC W/AUTO DIFF WBC: CPT | Performed by: STUDENT IN AN ORGANIZED HEALTH CARE EDUCATION/TRAINING PROGRAM

## 2024-10-07 PROCEDURE — C1894 INTRO/SHEATH, NON-LASER: HCPCS | Performed by: INTERNAL MEDICINE

## 2024-10-07 PROCEDURE — 93458 L HRT ARTERY/VENTRICLE ANGIO: CPT | Mod: 26,,, | Performed by: INTERNAL MEDICINE

## 2024-10-07 PROCEDURE — 93458 L HRT ARTERY/VENTRICLE ANGIO: CPT | Performed by: INTERNAL MEDICINE

## 2024-10-07 PROCEDURE — 11000001 HC ACUTE MED/SURG PRIVATE ROOM

## 2024-10-07 PROCEDURE — 96361 HYDRATE IV INFUSION ADD-ON: CPT

## 2024-10-07 PROCEDURE — 25000003 PHARM REV CODE 250: Performed by: STUDENT IN AN ORGANIZED HEALTH CARE EDUCATION/TRAINING PROGRAM

## 2024-10-07 PROCEDURE — 80048 BASIC METABOLIC PNL TOTAL CA: CPT | Performed by: STUDENT IN AN ORGANIZED HEALTH CARE EDUCATION/TRAINING PROGRAM

## 2024-10-07 PROCEDURE — 99223 1ST HOSP IP/OBS HIGH 75: CPT | Mod: ,,, | Performed by: INTERNAL MEDICINE

## 2024-10-07 PROCEDURE — C1769 GUIDE WIRE: HCPCS | Performed by: INTERNAL MEDICINE

## 2024-10-07 PROCEDURE — A9585 GADOBUTROL INJECTION: HCPCS | Performed by: STUDENT IN AN ORGANIZED HEALTH CARE EDUCATION/TRAINING PROGRAM

## 2024-10-07 RX ORDER — MIDAZOLAM HYDROCHLORIDE 1 MG/ML
INJECTION INTRAMUSCULAR; INTRAVENOUS
Status: DISCONTINUED | OUTPATIENT
Start: 2024-10-07 | End: 2024-10-07

## 2024-10-07 RX ORDER — HEPARIN SODIUM 200 [USP'U]/100ML
INJECTION, SOLUTION INTRAVENOUS
Status: DISCONTINUED | OUTPATIENT
Start: 2024-10-07 | End: 2024-10-07

## 2024-10-07 RX ORDER — HYDROCODONE BITARTRATE AND ACETAMINOPHEN 5; 325 MG/1; MG/1
1 TABLET ORAL EVERY 4 HOURS PRN
Status: DISCONTINUED | OUTPATIENT
Start: 2024-10-07 | End: 2024-10-08 | Stop reason: HOSPADM

## 2024-10-07 RX ORDER — ACETAMINOPHEN 325 MG/1
650 TABLET ORAL EVERY 4 HOURS PRN
Status: DISCONTINUED | OUTPATIENT
Start: 2024-10-07 | End: 2024-10-08 | Stop reason: HOSPADM

## 2024-10-07 RX ORDER — VERAPAMIL HYDROCHLORIDE 2.5 MG/ML
INJECTION, SOLUTION INTRAVENOUS
Status: DISCONTINUED | OUTPATIENT
Start: 2024-10-07 | End: 2024-10-07

## 2024-10-07 RX ORDER — LEVETIRACETAM 500 MG/5ML
750 INJECTION, SOLUTION, CONCENTRATE INTRAVENOUS EVERY 12 HOURS
Status: DISCONTINUED | OUTPATIENT
Start: 2024-10-07 | End: 2024-10-08 | Stop reason: HOSPADM

## 2024-10-07 RX ORDER — MORPHINE SULFATE 4 MG/ML
2 INJECTION, SOLUTION INTRAMUSCULAR; INTRAVENOUS EVERY 10 MIN PRN
Status: DISCONTINUED | OUTPATIENT
Start: 2024-10-07 | End: 2024-10-08

## 2024-10-07 RX ORDER — GADOBUTROL 604.72 MG/ML
9.5 INJECTION INTRAVENOUS
Status: COMPLETED | OUTPATIENT
Start: 2024-10-07 | End: 2024-10-07

## 2024-10-07 RX ORDER — FENTANYL CITRATE 50 UG/ML
INJECTION, SOLUTION INTRAMUSCULAR; INTRAVENOUS
Status: DISCONTINUED | OUTPATIENT
Start: 2024-10-07 | End: 2024-10-07

## 2024-10-07 RX ORDER — LEVETIRACETAM 500 MG/5ML
2000 INJECTION, SOLUTION, CONCENTRATE INTRAVENOUS ONCE
Status: COMPLETED | OUTPATIENT
Start: 2024-10-07 | End: 2024-10-07

## 2024-10-07 RX ORDER — HEPARIN SODIUM 1000 [USP'U]/ML
INJECTION, SOLUTION INTRAVENOUS; SUBCUTANEOUS
Status: DISCONTINUED | OUTPATIENT
Start: 2024-10-07 | End: 2024-10-07

## 2024-10-07 RX ORDER — ATROPINE SULFATE 0.1 MG/ML
0.5 INJECTION INTRAVENOUS
Status: DISCONTINUED | OUTPATIENT
Start: 2024-10-07 | End: 2024-10-08 | Stop reason: HOSPADM

## 2024-10-07 RX ORDER — LORAZEPAM 2 MG/ML
2 INJECTION INTRAMUSCULAR DAILY PRN
Status: DISCONTINUED | OUTPATIENT
Start: 2024-10-07 | End: 2024-10-08 | Stop reason: HOSPADM

## 2024-10-07 RX ORDER — LIDOCAINE HYDROCHLORIDE 10 MG/ML
INJECTION, SOLUTION EPIDURAL; INFILTRATION; INTRACAUDAL; PERINEURAL
Status: DISCONTINUED | OUTPATIENT
Start: 2024-10-07 | End: 2024-10-07

## 2024-10-07 RX ORDER — ONDANSETRON 8 MG/1
8 TABLET, ORALLY DISINTEGRATING ORAL EVERY 8 HOURS PRN
Status: DISCONTINUED | OUTPATIENT
Start: 2024-10-07 | End: 2024-10-08

## 2024-10-07 RX ADMIN — SODIUM CHLORIDE: 9 INJECTION, SOLUTION INTRAVENOUS at 09:10

## 2024-10-07 RX ADMIN — LEVETIRACETAM 750 MG: 100 INJECTION, SOLUTION INTRAVENOUS at 08:10

## 2024-10-07 RX ADMIN — GABAPENTIN 300 MG: 300 CAPSULE ORAL at 08:10

## 2024-10-07 RX ADMIN — LEVETIRACETAM 2000 MG: 100 INJECTION, SOLUTION INTRAVENOUS at 10:10

## 2024-10-07 RX ADMIN — DIPHENHYDRAMINE HYDROCHLORIDE 50 MG: 25 CAPSULE ORAL at 09:10

## 2024-10-07 RX ADMIN — CLOPIDOGREL BISULFATE 75 MG: 75 TABLET ORAL at 10:10

## 2024-10-07 RX ADMIN — GADOBUTROL 9.5 ML: 604.72 INJECTION INTRAVENOUS at 06:10

## 2024-10-07 RX ADMIN — AMLODIPINE BESYLATE 5 MG: 5 TABLET ORAL at 10:10

## 2024-10-07 RX ADMIN — ATORVASTATIN CALCIUM 20 MG: 10 TABLET, FILM COATED ORAL at 10:10

## 2024-10-07 RX ADMIN — ASPIRIN 81 MG CHEWABLE TABLET 81 MG: 81 TABLET CHEWABLE at 09:10

## 2024-10-07 RX ADMIN — SODIUM CHLORIDE 2000 ML: 9 INJECTION, SOLUTION INTRAVENOUS at 12:10

## 2024-10-07 NOTE — HOSPITAL COURSE
Pradeep Ro placed in observation for further ACS versus seizure workup.  In the ED patient is slightly leukocytosis WBC 13.67, potassium 3.0, calcium 7.4, initial troponin 0.147 which down trended to 0.091, CTA chest abdomen pelvis with no evidence of aortic aneurysm or dissection or intra abdominal abnormality.  Chest x-ray of hip, shoulder, and chest with no acute pathology.  CT head and maxillofacial bones did not identify any fractures.  Cardiology was consulted and patient underwent LHC with normal CORS.  Cardiology included syncope and elevated troponin likely due to seizures and recommended no further inpatient cardiac testing.  Neurology was consulted for seizures vs syncope as etiology is unclear.  Epilepsy workup initiated with EEG and MRI brain epilepsy protocol.  Patient was loaded with Keppra 2 g and started on 750 mg b.i.d. for maintenance.  P.r.n. Ativan 2 mg IV order for seizures lasting more than 2 minutes.  Seizure precautions explained to patient in his wife and driving restrictions explained per [facility] state law.  Calcium was normal on October 4, 6, 7, and 8.  Hypocalcemia on Oct 5 presumed lab error.  MRI and EEG unremarkable.  Prescription for keppra 750 mg BID and refill of amlodipine sent to pharmacy.  All findings and plan discussed with patient, all questions answered, verbalizes understanding.  Discharged home in stable condition.    Unable to assess

## 2024-10-07 NOTE — PLAN OF CARE
Problem: Adult Inpatient Plan of Care  Goal: Optimal Comfort and Wellbeing  Outcome: Progressing     Problem: Nausea and Vomiting  Goal: Nausea and Vomiting Relief  Outcome: Progressing  Intervention: Prevent and Manage Nausea and Vomiting  Flowsheets (Taken 10/7/2024 6706)  Nausea/Vomiting Interventions: (zofran given) other (see comments)     Problem: Pain Acute  Goal: Optimal Pain Control and Function  Outcome: Progressing

## 2024-10-07 NOTE — ASSESSMENT & PLAN NOTE
Patients blood pressure range in the last 24 hours was: BP  Min: 118/58  Max: 168/77.The patient's inpatient anti-hypertensive regimen is listed below:  Current Antihypertensives  amLODIPine tablet 5 mg, Daily, Oral  verapamiL injection, As needed (PRN),   nitroGLYCERIN in D5W 200 mcg/mL vial, As needed (PRN), Amlodipine 5mg daily    Plan  - BP is uncontrolled, restart home amlodipine

## 2024-10-07 NOTE — NURSING
[facility] [place]  Nurses Note -- 4 Eyes         Skin assessed on: Q Shift      [x] No Pressure Injuries Present    []Prevention Measures Documented    [] Yes LDA  for Pressure Injury Previously documented     [] Yes New Pressure Injury Discovered   [] LDA for New Pressure Injury Added      Attending RN:  Linnea Rosenbaum, RN     Second RN:  Jaycob Perez RN          PER handoff given by Jaycob GREEN      Pt resting in bed quietly. NAD noted. Complained of neck/back pain 7/10. Will give PRN pain med once ordered by MD     Fall and safety precautions maintained. Bed alarm activated and audible.. Bed locked in lowest position, with side rails up x2. Call bell and personal items within reach

## 2024-10-07 NOTE — BRIEF OP NOTE
[facility] [place] - Cath Lab  Brief Operative Note     SUMMARY     Surgery Date: 10/7/2024     Surgeons and Role:     * Konstantin Peres MD - Primary    Assisting Surgeon: None    Pre-op Diagnosis:  NSTEMI (non-ST elevated myocardial infarction) [I21.4]    Post-op Diagnosis:  Post-Op Diagnosis Codes:     * NSTEMI (non-ST elevated myocardial infarction) [I21.4]    Procedure(s) (LRB):  Angiogram, Coronary, with Left Heart Cath (Left)    Anesthesia: Monitor Anesthesia Care    Description of the findings of the procedure: uneventful LHC/cor angio via R radial.  Normal cors.    Findings/Key Components:  LVEDP: 3mmHg  LVEF: 55% by echo    Dominance: Right  LM: normal  LAD: normal  LCx: normal  RCA: normal    Hemostasis:  R Radial band    Impression:  Syncope with elev trop, ?seizure.  Normal cors.  Normal LV fxn by echo.  R rad vasband.    Plan:  Stop ASA/Plavix.  Cont med rx/neuro workup.  No further inpat cardiac testing.    Estimated Blood Loss: <50cc         Specimens: None

## 2024-10-07 NOTE — NURSING
Received from PACU per stretcher with transport with ongoing PNSS bolus. Dressing was dry, clean, and intact. No complaints of pain or discomfort noted. Patient instructed not to put any force in his right hand.

## 2024-10-07 NOTE — ASSESSMENT & PLAN NOTE
Patient had trops checked due to compliant of back pain concerning of atypical anginal symptoms. Patient reports good exercise capacity and no prior episodes of exertional chest pain  Trops peaked    Plan  Echocardiogram with normal systolic function EF 55-60%  Cardiology consulted:  Patient underwent LHC with normal CORS.  No further inpatient cardiac testing required.  Patient can stop aspirin/Plavix.

## 2024-10-07 NOTE — ASSESSMENT & PLAN NOTE
Patient presents after being found confused in the bathroom floor by her his wife.  Wife states that patient was the floor for 45 minutes.  Per wife, patient has a history of seizure about 10 years ago but not on any antiseizure medications.  Evaluated by Neurology.  Etiology unknown however suspect seizure.    Plan:  -Patient loaded with Keppra 2 g and started on 750 mg b.i.d. for maintenance  -MRI brain with and without contrast epilepsy protocol ordered  -EEG ordered  -P.r.n. Ativan 2 mg IV for seizure lasting more than 2 minutes  -Seizure protocols and restrictions explained to patient  -Hold precautions seizure precautions in place  -Neurology will follow up tomorrow (10/08/2024) for further evaluation.

## 2024-10-07 NOTE — CARE UPDATE
Per nursing, patient is complaining of 7/10 pain for his neck and back area. they gave him one time dose of hydrocodone earlier which help him with the pain.     Added Vicodin 5/325mg mod severe

## 2024-10-07 NOTE — NURSING
[facility] Guillermina (Cath[facility] nurse) and spouse. Patient was drowsy but arousable because of Keppra, cathlab nurse made aware. Pt. Had 2 IV line in left AC and right AC.  No apparent distress noted.

## 2024-10-07 NOTE — PLAN OF CARE
Problem: Adult Inpatient Plan of Care  Goal: Plan of Care Review  Outcome: Progressing  Flowsheets (Taken 10/7/2024 1801)  Plan of Care Reviewed With:   patient   spouse  Goal: Patient-Specific Goal (Individualized)  Outcome: Progressing  Goal: Absence of Hospital-Acquired Illness or Injury  Outcome: Progressing  Intervention: Identify and Manage Fall Risk  Flowsheets (Taken 10/7/2024 1801)  Safety Promotion/Fall Prevention:   assistive device/personal item within reach   bed alarm set   commode/urinal/bedpan at bedside   lighting adjusted   instructed to call staff for mobility   nonskid shoes/socks when out of bed   side rails raised x 2  Intervention: Prevent Skin Injury  Flowsheets (Taken 10/7/2024 1801)  Body Position: position changed independently  Device Skin Pressure Protection: absorbent pad utilized/changed  Intervention: Prevent and Manage VTE (Venous Thromboembolism) Risk  Flowsheets (Taken 10/7/2024 1801)  VTE Prevention/Management:   ambulation promoted   fluids promoted  Intervention: Prevent Infection  Flowsheets (Taken 10/7/2024 1801)  Infection Prevention:   hand hygiene promoted   personal protective equipment utilized   single patient room provided  Goal: Optimal Comfort and Wellbeing  Outcome: Progressing  Intervention: Monitor Pain and Promote Comfort  Flowsheets (Taken 10/7/2024 1801)  Pain Management Interventions: medication offered  Goal: Readiness for Transition of Care  Outcome: Progressing     Problem: Nausea and Vomiting  Goal: Nausea and Vomiting Relief  Outcome: Progressing     Problem: Pain Acute  Goal: Optimal Pain Control and Function  Outcome: Progressing  Intervention: Develop Pain Management Plan  Flowsheets (Taken 10/7/2024 1801)  Pain Management Interventions: medication offered  Intervention: Prevent or Manage Pain  Flowsheets (Taken 10/7/2024 1801)  Medication Review/Management: medications reviewed     Problem: Wound  Goal: Optimal Coping  Outcome: Progressing  Goal:  Optimal Functional Ability  Outcome: Progressing  Goal: Absence of Infection Signs and Symptoms  Outcome: Progressing  Goal: Improved Oral Intake  Outcome: Progressing  Goal: Optimal Pain Control and Function  Outcome: Progressing  Goal: Skin Health and Integrity  Outcome: Progressing  Intervention: Optimize Skin Protection  Flowsheets (Taken 10/7/2024 1801)  Pressure Reduction Techniques: frequent weight shift encouraged  Activity Management:   Ambulated to bathroom - L4   Ambulated in room - L4   Arm raise - L1   Heel slide - L1   Leg kicks - L2   Rolling - L1   Sitting at edge of bed - L2  Head of Bed (HOB) Positioning: HOB elevated  Goal: Optimal Wound Healing  Outcome: Progressing

## 2024-10-07 NOTE — CONSULTS
[facility] - Telemetry  Neurology Consult Note    Patient Name: Pradeep Ro  Age: 34 y.o.  MRN: [id number]  Admission Date: 10/5/2024  Reason for consult:  Seizure v/s syncope    CC:  Found down    HPI:   Pradeep Ro is a 34 y.o. year old male with significant past medical history of hypertension, JUAN RAMON, hyperlipidemia presenting to the ER yesterday after being found down.  History obtained from patient and his wife on the phone as well as chart review.    Patient states that he got up from his bed and went to the bathroom.  He only remembers sitting on the toilet and the next thing he remembers is being on the floor.  Wife states that she did not see the beginning of the event but when she got to the bathroom, patient had lost consciousness while she was trying to enter the bathroom.  Noted blood on the floor coming from his mouth as well as tongue bite and noted as well.  Wife says that patient likely bit his tongue but also hit his face to the bathtub bursting his lips open.  While on the floor, she noticed that when he started regaining consciousness, he had a glazed staring episode for a few seconds and then he was slowly coming back to consciousness but seemed confused and was repeating questions.  She called EMS and patient was almost returning back to baseline.  While in the ambulance, patient had a another episode of staring episode but he quickly broke out of it followed by confusion without any administration of benzos.    In the ER, /91 otherwise vitals normal.  Patient was noted to be slightly confused and mild oral trauma.  Initial blood work concerning for WBC 13.67, troponin 0.053, potassium 3, calcium 7.4, .  CT head was obtained which was unremarkable.  Interestingly patient had presented to the ER 1 day prior on 10/04 for nonspecific left-sided flank pain at which time his calcium was 9.1.  Given the troponin leak, cardiology was consulted as well plan for cardiac angiogram for today.   Neurology was consulted seizure evaluation.    Patient states that he has had 1 episode of LOC 10 years back at which time there was a concern for seizure.  He was however not placed on seizure medication at that time given solitary unwitnessed event.  Denies any family history of epilepsy on father's side, unsure of his maternal side.  Denies febrile seizures.      Histories:  Allergies:  Patient has no known allergies.    Current Medications:    Current Facility-Administered Medications   Medication Dose Route Frequency Provider Last Rate Last Admin    0.9%  NaCl infusion   Intravenous Continuous Konstantin Peres  mL/hr at 10/06/24 2314 New Bag at 10/06/24 2314    acetaminophen tablet 650 mg  650 mg Oral Q4H PRN Nurys Burnett MD        aluminum-magnesium hydroxide-simethicone 200-200-20 mg/5 mL suspension 30 mL  30 mL Oral QID PRN Nurys Burnett MD        amLODIPine tablet 5 mg  5 mg Oral Daily Nurys Burnett MD   5 mg at 10/06/24 0957    aspirin chewable tablet 81 mg  81 mg Oral Daily Konstantin Peres MD        atorvastatin tablet 20 mg  20 mg Oral Daily Nurys Burnett MD   20 mg at 10/06/24 0957    bisacodyL suppository 10 mg  10 mg Rectal Daily PRN Nurys Burnett MD        clopidogreL tablet 75 mg  75 mg Oral Daily Konstantin Peres MD        dextrose 10% bolus 125 mL 125 mL  12.5 g Intravenous PRN Nurys Burnett MD        dextrose 10% bolus 250 mL 250 mL  25 g Intravenous PRN Nurys Burnett MD        diphenhydrAMINE capsule 50 mg  50 mg Oral Once Konstantin Peres MD        gabapentin capsule 300 mg  300 mg Oral QHS Nurys Burnett MD   300 mg at 10/06/24 2205    glucagon (human recombinant) injection 1 mg  1 mg Intramuscular PRN Nurys Burnett MD        glucose chewable tablet 16 g  16 g Oral PRN Nurys Burnett MD        glucose chewable tablet 24 g  24 g Oral PRN Nurys Burnett MD        HYDROcodone-acetaminophen 5-325 mg per tablet 1 tablet  1 tablet Oral Q6H PRN AMALIA Cartwright MD    1 tablet at 10/06/24 2205    magnesium oxide tablet 800 mg  800 mg Oral PRN Nurys Burnett MD        magnesium oxide tablet 800 mg  800 mg Oral PRN Nurys Burnett MD        naloxone 0.4 mg/mL injection 0.02 mg  0.02 mg Intravenous PRN Nurys Burnett MD        ondansetron injection 4 mg  4 mg Intravenous Q6H PRN AMALIA Cartwright MD   4 mg at 10/06/24 2205    polyethylene glycol packet 17 g  17 g Oral Daily Nurys Burnett MD   17 g at 10/06/24 0958    potassium bicarbonate disintegrating tablet 35 mEq  35 mEq Oral PRN Nurys Burnett MD        potassium bicarbonate disintegrating tablet 50 mEq  50 mEq Oral PRN Nurys Burnett MD        potassium bicarbonate disintegrating tablet 60 mEq  60 mEq Oral PRN Nurys Burnett MD        potassium, sodium phosphates 280-160-250 mg packet 2 packet  2 packet Oral PRN Nurys uBrnett MD        potassium, sodium phosphates 280-160-250 mg packet 2 packet  2 packet Oral PRN Nurys Burnett MD        prochlorperazine injection Soln 5 mg  5 mg Intravenous Q6H PRN AMALIA Cartwright MD   5 mg at 10/06/24 0247    sodium chloride 0.9% flush 10 mL  10 mL Intravenous Q12H PRN Nurys Burnett MD        sodium chloride 0.9% flush 10 mL  10 mL Intravenous PRN Konstantin Peres MD           Medical:   Past Medical History:   Diagnosis Date    Hyperlipidemia, unspecified 02/21/2024    JUAN RAMON (obstructive sleep apnea) 02/24/2024    Primary hypertension 10/6/2024        Surgeries: History reviewed. No pertinent surgical history.     Family:   Family History   Problem Relation Name Age of Onset    Heart disease Father      Hyperlipidemia Father      Heart disease Paternal Grandfather      Hyperlipidemia Paternal Grandfather         Tobacco Use    Smoking status: Never    Smokeless tobacco: Never   Substance and Sexual Activity    Alcohol use: Yes     Alcohol/week: 6.0 standard drinks of alcohol     Types: 3 Shots of liquor, 3 Drinks containing 0.5 oz of alcohol per week    Drug use: No    Sexual  "activity: Yes     Partners: Female     Birth control/protection: OCP         Physical Exam:     Physical Examination  BP (!) 118/58   Pulse 70   Temp 97.9 °F (36.6 °C) (Oral)   Resp 17   Ht 5' 9" (1.753 m)   Wt 99.4 kg (219 lb 2.2 oz)   SpO2 97%   BMI 32.36 kg/m²   Body mass index is 32.36 kg/m².    Neurological Exam  Mental Status:   Alert and oriented to name, date, location  Recent/remote memory, registration, attention span/concentration, fund of knowledge intact by history.    CN:   II, III, IV, VI: PERRL, EOMI, no nystagmus, fundus not visualized due to inadequate dilation.V: intact to fine touch, temperature, pain.VII: symmetrical facial movement, nice smile, no drooping.VIII: grossly intact to hearing XII: tongue midline    Motor:   5/5 in all 4 extremities, no drift                Reflexes:   No Clonus, down going toes b/l.    Sensation: on both UEs and LEs    Intact to all modalities tested    Coordination:    Tremor: Absent.    Gait:    Not tested      Significant Labs:   Recent Lab Results         10/07/24  0538        Anion Gap 10       Baso # 0.02       Basophil % 0.3       BUN 9       Calcium 8.8       Chloride 103       CO2 27       Creatinine 1.2       Differential Method Automated       eGFR >60       Eos # 0.2       Eos % 3.4       Glucose 88       Gran # (ANC) 3.1       Gran % 52.5       Hematocrit 43.8       Hemoglobin 14.0       Immature Grans (Abs) 0.01  Comment: Mild elevation in immature granulocytes is non specific and   can be seen in a variety of conditions including stress response,   acute inflammation, trauma and pregnancy. Correlation with other   laboratory and clinical findings is essential.         Immature Granulocytes 0.2       Lymph # 2.1       Lymph % 36.1       MCH 29.2       MCHC 32.0       MCV 91       Mono # 0.4       Mono % 7.5       MPV 8.8       nRBC 0       Platelet Count 259       Potassium 3.9       RBC 4.80       RDW 13.2       Sodium 140       WBC 5.87    "            Significant Imaging:   Images were personally reviewed and interpreted:   CT head without contrast: JIMENEZ    Assessment and Plan:   34 y.o. year old male with significant past medical history of hypertension, JUAN RAMON, hyperlipidemia presenting to the ER yesterday after being found down.  Description from the wife concerning for clusters of episodes of focal seizure with impaired awareness.  Likely had secondary generalization which was unwitnessed.  Patient has had one prior episode with unclear description however not been placed on ASM at that time.    Etiology is unclear at this time.  We will obtain epilepsy workup including EEG and MRI brain epilepsy protocol.  It was interesting to note that patient had normal calcium 1 day prior to presentation, can not deny that this event could be secondary to hypocalcemia.  However given the past episode and concern for clustering, we will place patient on antiseizure medication of the same.     Plan:   - obtain MRI brain with and without contrast epilepsy protocol  - EEG awake and drowsy  - load with Keppra 2 g and start on 750 mg b.i.d. for maintenance  - p.r.n. Ativan 2 mg IV for seizure lasting for more than 2 minutes  - driving restriction explained to patient and wife for 6 months from last seizure per [facility] State law.   - kindly correct metabolic/infectious derangements per Hospital Medicine.  Unclear if hypocalcemia needs to be worked up  - went over other safety precautions and first aid for epilepsy.  - follow up in Neurology Clinic as outpatient      Thank you for your consult. I will follow-up with patient. Please contact us if you have any additional questions.    Time spent on this encounter: 30 minutes. This includes face to face time and non-face to face time preparing to see the patient (eg, review of tests), obtaining and/or reviewing separately obtained history, documenting clinical information in the electronic or other health record,  independently interpreting results and communicating results to the patient/family/caregiver, or care coordinator.     Delicia Parks MD  Neurology  [facility]    Statement Selected

## 2024-10-07 NOTE — SUBJECTIVE & OBJECTIVE
Interval History:  Patient seen and examined.  NAD.  Denies any seizures or syncopal episodes overnight.  Denies any chest pain, shortness on breath, nausea, vomiting, or any other associated symptoms. Undergoing [facility] today with Cards. Neurology evaluated and Keppra started for seizures. Fall/seizure precautions in place     Review of Systems   Constitutional: Negative.    HENT: Negative.     Respiratory: Negative.     Cardiovascular: Negative.    Gastrointestinal: Negative.    Genitourinary: Negative.    Musculoskeletal: Negative.    Neurological: Negative.    Psychiatric/Behavioral: Negative.       Objective:     Vital Signs (Most Recent):  Temp: 98.8 °F (37.1 °C) (10/07/24 1128)  Pulse: 63 (10/07/24 1530)  Resp: 19 (10/07/24 1530)  BP: (!) 149/93 (10/07/24 1530)  SpO2: 99 % (10/07/24 1530) Vital Signs (24h Range):  Temp:  [97.9 °F (36.6 °C)-98.8 °F (37.1 °C)] 98.8 °F (37.1 °C)  Pulse:  [63-85] 63  Resp:  [11-29] 19  SpO2:  [91 %-100 %] 99 %  BP: (118-156)/() 149/93     Weight: 99.4 kg (219 lb 2.2 oz)  Body mass index is 32.36 kg/m².    Intake/Output Summary (Last 24 hours) at 10/7/2024 1537  Last data filed at 10/7/2024 1500  Gross per 24 hour   Intake --   Output 1300 ml   Net -1300 ml         Physical Exam  Constitutional:       General: He is not in acute distress.     Appearance: Normal appearance.   HENT:      Head: Normocephalic and atraumatic.      Mouth/Throat:      Mouth: Mucous membranes are moist.   Eyes:      Extraocular Movements: Extraocular movements intact.   Cardiovascular:      Rate and Rhythm: Normal rate and regular rhythm.      Pulses: Normal pulses.   Pulmonary:      Effort: Pulmonary effort is normal.   Abdominal:      General: Abdomen is flat.   Musculoskeletal:      Right lower leg: No edema.      Left lower leg: No edema.   Skin:     General: Skin is warm.   Neurological:      General: No focal deficit present.      Mental Status: He is alert and oriented to person, place, and time.  Mental status is at baseline.      GCS: GCS eye subscore is 4. GCS verbal subscore is 5. GCS motor subscore is 6.      Cranial Nerves: Cranial nerves 2-12 are intact.      Motor: Motor function is intact.      Comments: Neurologically intact, no neuro deficits, 5/5 muscle strength in both upper and lower extremity.   Psychiatric:         Mood and Affect: Mood normal.         Behavior: Behavior normal.         Thought Content: Thought content normal.             Significant Labs: All pertinent labs within the past 24 hours have been reviewed.    Significant Imaging: I have reviewed all pertinent imaging results/findings within the past 24 hours.  Imaging Results              CTA Chest Abdomen Pelvis (Final result)  Result time 10/06/24 01:01:08      Final result by Elton Odonnell MD (10/06/24 01:01:08)                   Impression:      1. No evidence of aortic aneurysm or dissection.  2. No acute intrathoracic or intra-abdominal abnormalities identified.      Electronically signed by: Elton Odonnell MD  Date:    10/06/2024  Time:    01:01               Narrative:    EXAMINATION:  CTA CHEST ABDOMEN PELVIS    CLINICAL HISTORY:  Aortic dissection suspected;trauma with question of seizure, elevated troponin;    TECHNIQUE:  CTA chest abdomen and pelvis was obtained following administration of 100 cc Omnipaque 350 IV contrast.  Sagittal and coronal reformats were obtained.    COMPARISON:  CT abdomen and pelvis from yesterday 10/04/2024.    FINDINGS:  No evidence of aortic aneurysm or dissection.  Branch vessels of the aortic arch and abdominal aortic branch vessels are widely patent.  Bilateral iliacs are patent.  Incidental note is made of retroaortic coursing left renal vein.    Heart is normal in size with no pericardial effusion.  No evidence of PE.  No significant abnormalities are seen along the esophageal course.  Major airways are patent.  Lungs are clear and symmetrically expanded.  No evidence of  consolidation, pneumothorax, or pleural effusion.    No significant hepatic abnormalities are identified.  There is no intra-or extrahepatic biliary ductal dilatation.  The gallbladder is unremarkable.  The stomach, pancreas, spleen, and adrenal glands are unremarkable.    Kidneys enhance normally with no evidence of hydronephrosis.  No abnormalities are seen along the ureteral courses.  Urinary bladder and prostate are unremarkable.    Appendix is visualized and is unremarkable.  The visualized loops of small and large bowel show no evidence of obstruction or inflammation.  No free air or free fluid.    No acute osseous abnormality identified. Subcutaneous soft tissues show no significant abnormalities.                                       X-Ray Shoulder Trauma Right (Final result)  Result time 10/05/24 22:34:44      Final result by Elton Odonnell MD (10/05/24 22:34:44)                   Impression:      No acute osseous abnormality identified.      Electronically signed by: Elton Odonnell MD  Date:    10/05/2024  Time:    22:34               Narrative:    EXAMINATION:  XR SHOULDER TRAUMA 3 VIEW RIGHT    CLINICAL HISTORY:  Pain in unspecified shoulder    TECHNIQUE:  Three views of the right shoulder were performed.    COMPARISON:  None    FINDINGS:  No evidence of acute displaced fracture, dislocation, or osseous destructive process.                                       X-Ray Hip 2 or 3 views Left with Pelvis when performed (Final result)  Result time 10/05/24 22:35:42      Final result by Elton Odonnell MD (10/05/24 22:35:42)                   Impression:      No acute osseous abnormality identified.      Electronically signed by: Elton Odonnell MD  Date:    10/05/2024  Time:    22:35               Narrative:    EXAMINATION:  XR HIP WITH PELVIS WHEN PERFORMED 2 OR 3 VIEWS LEFT    CLINICAL HISTORY:  Pain in unspecified hip    TECHNIQUE:  AP view of the pelvis and frog leg lateral view of the left hip were  performed.    COMPARISON:  None    FINDINGS:  No evidence of acute displaced fracture, dislocation, or osseous destructive process.  Hip joint spaces appear fairly well maintained.                                       X-Ray Chest 1 View (Final result)  Result time 10/05/24 21:45:19      Final result by Elton Odonnell MD (10/05/24 21:45:19)                   Impression:      No acute cardiopulmonary process identified.      Electronically signed by: Elton Odonnell MD  Date:    10/05/2024  Time:    21:45               Narrative:    EXAMINATION:  XR CHEST 1 VIEW    CLINICAL HISTORY:  Injury, unspecified, initial encounter    TECHNIQUE:  Single frontal view of the chest was performed.    COMPARISON:  10/04/2024.    FINDINGS:  Cardiac silhouette is normal in size.  Lungs are symmetrically expanded.  No evidence of focal consolidative process, pneumothorax, or significant pleural effusion.  No acute osseous abnormality identified.                                       CT Head Without Contrast (Final result)  Result time 10/05/24 21:33:28      Final result by Elton Odonnell MD (10/05/24 21:33:28)                   Impression:      No acute intracranial abnormalities identified.    No acute facial fractures identified.      Electronically signed by: Elton Odonnell MD  Date:    10/05/2024  Time:    21:33               Narrative:    EXAMINATION:  CT HEAD WITHOUT CONTRAST; CT MAXILLOFACIAL WITHOUT CONTRAST    CLINICAL HISTORY:  Mental status change, unknown cause;; Facial trauma, blunt;    TECHNIQUE:  Low dose axial images were obtained through the head and maxillofacial region.  Coronal and sagittal reformations were also performed. Contrast was not administered.    COMPARISON:  CT head from February 2015.    FINDINGS:  No evidence of acute/recent major vascular distribution cerebral infarction, intraparenchymal hemorrhage, or intra-axial space occupying lesion. The ventricular system is normal in size and with cavum  septum pellucidum noted.  No effacement of the skull-base cisterns. No abnormal extra-axial fluid collections or blood products.    No acute facial fractures are identified.  Orbits show no acute abnormalities.  Visualized paranasal sinuses and mastoid air cells are clear. The calvarium shows no significant abnormality.  Visualized upper cervical spine shows no acute abnormalities.                                       CT Maxillofacial Without Contrast (Final result)  Result time 10/05/24 21:33:28      Final result by Elton Odonnell MD (10/05/24 21:33:28)                   Impression:      No acute intracranial abnormalities identified.    No acute facial fractures identified.      Electronically signed by: Elton Odonnell MD  Date:    10/05/2024  Time:    21:33               Narrative:    EXAMINATION:  CT HEAD WITHOUT CONTRAST; CT MAXILLOFACIAL WITHOUT CONTRAST    CLINICAL HISTORY:  Mental status change, unknown cause;; Facial trauma, blunt;    TECHNIQUE:  Low dose axial images were obtained through the head and maxillofacial region.  Coronal and sagittal reformations were also performed. Contrast was not administered.    COMPARISON:  CT head from February 2015.    FINDINGS:  No evidence of acute/recent major vascular distribution cerebral infarction, intraparenchymal hemorrhage, or intra-axial space occupying lesion. The ventricular system is normal in size and with cavum septum pellucidum noted.  No effacement of the skull-base cisterns. No abnormal extra-axial fluid collections or blood products.    No acute facial fractures are identified.  Orbits show no acute abnormalities.  Visualized paranasal sinuses and mastoid air cells are clear. The calvarium shows no significant abnormality.  Visualized upper cervical spine shows no acute abnormalities.

## 2024-10-07 NOTE — NURSING
[facility] [place]  Nurses Note -- 4 Eyes      10/7/2024       Skin assessed on: Q Shift      [] No Pressure Injuries Present    []Prevention Measures Documented    [] Yes LDA  for Pressure Injury Previously documented     [] Yes New Pressure Injury Discovered   [] LDA for New Pressure Injury Added      Attending RN:  Sabine Amin RN     Second RN: Linnea Rosenbaum RN

## 2024-10-07 NOTE — PROGRESS NOTES
[facility] [place] [facility]  Progress Note    Patient Name: Pradeep Ro  MRN: [id number]  Patient Class: IP- Inpatient   Admission Date: 10/5/2024  Length of Stay: 0 days  Attending Physician: Renée Ansari,*  Primary Care Provider: Mauro Gilmore MD        Subjective:     Principal Problem:LOC (loss of consciousness)        HPI:  34 y.o. male with PMH of Hypertension and back pain presents to the ED for evaluation of altered mental status and fall.    Patient reports he went to the bathroom to have a bowel movement then does not remember what happened.  His wife found him a couple hours later when she came home on the bathroom floor with blood coming from his mouth and nose.    The patient was confused when he woke up but able to stand up and walk.  He arrived to the ED confused. Subsequently patient retunred to his baseline but afterwards reported back pain with radiates to the middle of his shoulder blades, feels like heaviness and radiates to his shoulders.  Patient attempted Tylenol 1,000 mg and Meloxicam at 6:25 am this morning as treatment/medication with slight relief. No other alleviating or exacerbating factors.Patient does not have family hx of MI but reports maybe that his father had heart issues. He does not have a presonal hx of similar chest pain with exertion and he is able to lift weight or go up and down stairs without chest pain.   Denies history of kidney stones. Denies tobacco use. Denies chest pain, SOB, leg swelling, abdominal pain, dysuria, frequency, or other associated symptoms.    In the ED he was found to have leucocytosis, hypokalemia, acidosis, hypocalcimia and troponin leak.  CT Angio negative for PE. His Hip, Shoulder and CXR were negative for acute pathology  CT Head and maxillofacial bones did not identify any fractures    Patient is admitted for ACS rule out      Overview/Hospital Course:  Pradeep Ro admitted to observation unit for further ACS versus  seizure workup.  In the ED patient is slightly leukocytosis WBC 13.67, potassium 3.0, calcium 7.4, initial troponin 0.147 which down trended to 0.091, CTA chest abdomen pelvis with no evidence of aortic aneurysm or dissection or intra abdominal abnormality.  Chest x-ray of hip, shoulder, and chest with no acute pathology.  CT head and maxillofacial bones did not identify any fractures.  Cardiology was consulted and patient underwent LHC with normal CORS.  Cardiology included syncope and elevated troponin likely due to seizures and recommended no further inpatient cardiac testing.  Neurology was consulted for seizures vs syncope as etiology is unclear.  Epilepsy workup initiated with EEG and MRI brain epilepsy protocol.  Patient was loaded with Keppra 2 g and started on 715 mg b.i.d. for maintenance.  P.r.n. Ativan 2 mg IV order for seizures lasting more than 2 minutes.  Seizure precautions explained to patient in his wife and driving restrictions explained per [facility] state law.  Neurology will follow up with the patient tomorrow 10/08/2024.    Interval History:  Patient seen and examined.  NAD.  Denies any seizures or syncopal episodes overnight.  Denies any chest pain, shortness on breath, nausea, vomiting, or any other associated symptoms. Undergoing LHC today with Cards. Neurology evaluated and Keppra started for seizures. Fall/seizure precautions in place     Review of Systems   Constitutional: Negative.    HENT: Negative.     Respiratory: Negative.     Cardiovascular: Negative.    Gastrointestinal: Negative.    Genitourinary: Negative.    Musculoskeletal: Negative.    Neurological: Negative.    Psychiatric/Behavioral: Negative.       Objective:     Vital Signs (Most Recent):  Temp: 98.8 °F (37.1 °C) (10/07/24 1128)  Pulse: 63 (10/07/24 1530)  Resp: 19 (10/07/24 1530)  BP: (!) 149/93 (10/07/24 1530)  SpO2: 99 % (10/07/24 1530) Vital Signs (24h Range):  Temp:  [97.9 °F (36.6 °C)-98.8 °F (37.1 °C)] 98.8 °F  (37.1 °C)  Pulse:  [63-85] 63  Resp:  [11-29] 19  SpO2:  [91 %-100 %] 99 %  BP: (118-156)/() 149/93     Weight: 99.4 kg (219 lb 2.2 oz)  Body mass index is 32.36 kg/m².    Intake/Output Summary (Last 24 hours) at 10/7/2024 1537  Last data filed at 10/7/2024 1500  Gross per 24 hour   Intake --   Output 1300 ml   Net -1300 ml         Physical Exam  Constitutional:       General: He is not in acute distress.     Appearance: Normal appearance.   HENT:      Head: Normocephalic and atraumatic.      Mouth/Throat:      Mouth: Mucous membranes are moist.   Eyes:      Extraocular Movements: Extraocular movements intact.   Cardiovascular:      Rate and Rhythm: Normal rate and regular rhythm.      Pulses: Normal pulses.   Pulmonary:      Effort: Pulmonary effort is normal.   Abdominal:      General: Abdomen is flat.   Musculoskeletal:      Right lower leg: No edema.      Left lower leg: No edema.   Skin:     General: Skin is warm.   Neurological:      General: No focal deficit present.      Mental Status: He is alert and oriented to person, place, and time. Mental status is at baseline.      GCS: GCS eye subscore is 4. GCS verbal subscore is 5. GCS motor subscore is 6.      Cranial Nerves: Cranial nerves 2-12 are intact.      Motor: Motor function is intact.      Comments: Neurologically intact, no neuro deficits, 5/5 muscle strength in both upper and lower extremity.   Psychiatric:         Mood and Affect: Mood normal.         Behavior: Behavior normal.         Thought Content: Thought content normal.             Significant Labs: All pertinent labs within the past 24 hours have been reviewed.    Significant Imaging: I have reviewed all pertinent imaging results/findings within the past 24 hours.  Imaging Results              CTA Chest Abdomen Pelvis (Final result)  Result time 10/06/24 01:01:08      Final result by Elton Odonnell MD (10/06/24 01:01:08)                   Impression:      1. No evidence of aortic  aneurysm or dissection.  2. No acute intrathoracic or intra-abdominal abnormalities identified.      Electronically signed by: Elton Odonnell MD  Date:    10/06/2024  Time:    01:01               Narrative:    EXAMINATION:  CTA CHEST ABDOMEN PELVIS    CLINICAL HISTORY:  Aortic dissection suspected;trauma with question of seizure, elevated troponin;    TECHNIQUE:  CTA chest abdomen and pelvis was obtained following administration of 100 cc Omnipaque 350 IV contrast.  Sagittal and coronal reformats were obtained.    COMPARISON:  CT abdomen and pelvis from yesterday 10/04/2024.    FINDINGS:  No evidence of aortic aneurysm or dissection.  Branch vessels of the aortic arch and abdominal aortic branch vessels are widely patent.  Bilateral iliacs are patent.  Incidental note is made of retroaortic coursing left renal vein.    Heart is normal in size with no pericardial effusion.  No evidence of PE.  No significant abnormalities are seen along the esophageal course.  Major airways are patent.  Lungs are clear and symmetrically expanded.  No evidence of consolidation, pneumothorax, or pleural effusion.    No significant hepatic abnormalities are identified.  There is no intra-or extrahepatic biliary ductal dilatation.  The gallbladder is unremarkable.  The stomach, pancreas, spleen, and adrenal glands are unremarkable.    Kidneys enhance normally with no evidence of hydronephrosis.  No abnormalities are seen along the ureteral courses.  Urinary bladder and prostate are unremarkable.    Appendix is visualized and is unremarkable.  The visualized loops of small and large bowel show no evidence of obstruction or inflammation.  No free air or free fluid.    No acute osseous abnormality identified. Subcutaneous soft tissues show no significant abnormalities.                                       X-Ray Shoulder Trauma Right (Final result)  Result time 10/05/24 22:34:44      Final result by Elton Odonnell MD (10/05/24 22:34:44)                    Impression:      No acute osseous abnormality identified.      Electronically signed by: Elton Odonnell MD  Date:    10/05/2024  Time:    22:34               Narrative:    EXAMINATION:  XR SHOULDER TRAUMA 3 VIEW RIGHT    CLINICAL HISTORY:  Pain in unspecified shoulder    TECHNIQUE:  Three views of the right shoulder were performed.    COMPARISON:  None    FINDINGS:  No evidence of acute displaced fracture, dislocation, or osseous destructive process.                                       X-Ray Hip 2 or 3 views Left with Pelvis when performed (Final result)  Result time 10/05/24 22:35:42      Final result by Elton Odonnell MD (10/05/24 22:35:42)                   Impression:      No acute osseous abnormality identified.      Electronically signed by: Elton Odonnell MD  Date:    10/05/2024  Time:    22:35               Narrative:    EXAMINATION:  XR HIP WITH PELVIS WHEN PERFORMED 2 OR 3 VIEWS LEFT    CLINICAL HISTORY:  Pain in unspecified hip    TECHNIQUE:  AP view of the pelvis and frog leg lateral view of the left hip were performed.    COMPARISON:  None    FINDINGS:  No evidence of acute displaced fracture, dislocation, or osseous destructive process.  Hip joint spaces appear fairly well maintained.                                       X-Ray Chest 1 View (Final result)  Result time 10/05/24 21:45:19      Final result by Elton Odonnell MD (10/05/24 21:45:19)                   Impression:      No acute cardiopulmonary process identified.      Electronically signed by: Elton Odonnell MD  Date:    10/05/2024  Time:    21:45               Narrative:    EXAMINATION:  XR CHEST 1 VIEW    CLINICAL HISTORY:  Injury, unspecified, initial encounter    TECHNIQUE:  Single frontal view of the chest was performed.    COMPARISON:  10/04/2024.    FINDINGS:  Cardiac silhouette is normal in size.  Lungs are symmetrically expanded.  No evidence of focal consolidative process, pneumothorax, or significant  pleural effusion.  No acute osseous abnormality identified.                                       CT Head Without Contrast (Final result)  Result time 10/05/24 21:33:28      Final result by Elton Odonnell MD (10/05/24 21:33:28)                   Impression:      No acute intracranial abnormalities identified.    No acute facial fractures identified.      Electronically signed by: Elton Odonnell MD  Date:    10/05/2024  Time:    21:33               Narrative:    EXAMINATION:  CT HEAD WITHOUT CONTRAST; CT MAXILLOFACIAL WITHOUT CONTRAST    CLINICAL HISTORY:  Mental status change, unknown cause;; Facial trauma, blunt;    TECHNIQUE:  Low dose axial images were obtained through the head and maxillofacial region.  Coronal and sagittal reformations were also performed. Contrast was not administered.    COMPARISON:  CT head from February 2015.    FINDINGS:  No evidence of acute/recent major vascular distribution cerebral infarction, intraparenchymal hemorrhage, or intra-axial space occupying lesion. The ventricular system is normal in size and with cavum septum pellucidum noted.  No effacement of the skull-base cisterns. No abnormal extra-axial fluid collections or blood products.    No acute facial fractures are identified.  Orbits show no acute abnormalities.  Visualized paranasal sinuses and mastoid air cells are clear. The calvarium shows no significant abnormality.  Visualized upper cervical spine shows no acute abnormalities.                                       CT Maxillofacial Without Contrast (Final result)  Result time 10/05/24 21:33:28      Final result by Elton Odonnell MD (10/05/24 21:33:28)                   Impression:      No acute intracranial abnormalities identified.    No acute facial fractures identified.      Electronically signed by: Elton Odonnell MD  Date:    10/05/2024  Time:    21:33               Narrative:    EXAMINATION:  CT HEAD WITHOUT CONTRAST; CT MAXILLOFACIAL WITHOUT  CONTRAST    CLINICAL HISTORY:  Mental status change, unknown cause;; Facial trauma, blunt;    TECHNIQUE:  Low dose axial images were obtained through the head and maxillofacial region.  Coronal and sagittal reformations were also performed. Contrast was not administered.    COMPARISON:  CT head from February 2015.    FINDINGS:  No evidence of acute/recent major vascular distribution cerebral infarction, intraparenchymal hemorrhage, or intra-axial space occupying lesion. The ventricular system is normal in size and with cavum septum pellucidum noted.  No effacement of the skull-base cisterns. No abnormal extra-axial fluid collections or blood products.    No acute facial fractures are identified.  Orbits show no acute abnormalities.  Visualized paranasal sinuses and mastoid air cells are clear. The calvarium shows no significant abnormality.  Visualized upper cervical spine shows no acute abnormalities.                                        Assessment/Plan:      * LOC (loss of consciousness)  Patient presents after being found confused in the bathroom floor by her his wife.  Wife states that patient was the floor for 45 minutes.  Per wife, patient has a history of seizure about 10 years ago but not on any antiseizure medications.  Evaluated by Neurology.  Etiology unknown however suspect seizure.    Plan:  -Patient loaded with Keppra 2 g and started on 750 mg b.i.d. for maintenance  -MRI brain with and without contrast epilepsy protocol ordered  -EEG ordered  -P.r.n. Ativan 2 mg IV for seizure lasting more than 2 minutes  -Seizure protocols and restrictions explained to patient  -Hold precautions seizure precautions in place  -Neurology will follow up tomorrow (10/08/2024) for further evaluation.      Class 1 obesity due to excess calories without serious comorbidity in adult  Body mass index is 32.36 kg/m². Morbid obesity complicates all aspects of disease management from diagnostic modalities to treatment. Weight  loss encouraged and health benefits explained to patient.      Elevated troponin  Patient had trops checked due to compliant of back pain concerning of atypical anginal symptoms. Patient reports good exercise capacity and no prior episodes of exertional chest pain  Trops peaked    Plan  Echocardiogram with normal systolic function EF 55-60%  Cardiology consulted:  Patient underwent LHC with normal CORS.  No further inpatient cardiac testing required.  Patient can stop aspirin/Plavix.     Acute midline thoracic back pain  Patient reports acute midline back pain predominantly between the shoulder blades radiating to lower neck  Likely musculoskeletal in etiology   Imaging does not reveal any fractures  Plan:  PRN tylenol    Primary hypertension  Patients blood pressure range in the last 24 hours was: BP  Min: 118/58  Max: 168/77.The patient's inpatient anti-hypertensive regimen is listed below:  Current Antihypertensives  amLODIPine tablet 5 mg, Daily, Oral  verapamiL injection, As needed (PRN),   nitroGLYCERIN in D5W 200 mcg/mL vial, As needed (PRN), Amlodipine 5mg daily    Plan  - BP is uncontrolled, restart home amlodipine    Mixed hyperlipidemia  Continue statin      VTE Risk Mitigation (From admission, onward)           Ordered     IP VTE HIGH RISK PATIENT  Once         10/06/24 0217     Place sequential compression device  Until discontinued         10/06/24 0217                    Discharge Planning   ADORE:      Code Status: Full Code   Is the patient medically ready for discharge?:     Reason for patient still in hospital (select all that apply): Patient trending condition and Consult recommendations  Discharge Plan A: Home with family (Follow-ups)                  Thelma Salcedo PA-C  Department of Hospital Medicine   [facility]

## 2024-10-07 NOTE — NURSING
Patient to MRI per wheelchair with transport and spouse. Patient awake, alert, oriented without c/o discomfort at this time. No apparent distress noted.

## 2024-10-08 ENCOUNTER — TELEPHONE (OUTPATIENT)
Dept: INTERNAL MEDICINE | Facility: CLINIC | Age: 34
End: 2024-10-08
Payer: OTHER GOVERNMENT

## 2024-10-08 VITALS
TEMPERATURE: 99 F | HEART RATE: 73 BPM | RESPIRATION RATE: 18 BRPM | SYSTOLIC BLOOD PRESSURE: 139 MMHG | OXYGEN SATURATION: 96 % | DIASTOLIC BLOOD PRESSURE: 87 MMHG | BODY MASS INDEX: 32.46 KG/M2 | WEIGHT: 219.13 LBS | HEIGHT: 69 IN

## 2024-10-08 PROBLEM — M54.6 ACUTE MIDLINE THORACIC BACK PAIN: Status: RESOLVED | Noted: 2024-10-06 | Resolved: 2024-10-08

## 2024-10-08 PROBLEM — R79.89 ELEVATED TROPONIN: Status: RESOLVED | Noted: 2024-10-06 | Resolved: 2024-10-08

## 2024-10-08 LAB
ANION GAP SERPL CALC-SCNC: 8 MMOL/L (ref 8–16)
BASOPHILS # BLD AUTO: 0.03 K/UL (ref 0–0.2)
BASOPHILS NFR BLD: 0.7 % (ref 0–1.9)
BUN SERPL-MCNC: 10 MG/DL (ref 6–20)
CALCIUM SERPL-MCNC: 8.9 MG/DL (ref 8.7–10.5)
CHLORIDE SERPL-SCNC: 108 MMOL/L (ref 95–110)
CO2 SERPL-SCNC: 23 MMOL/L (ref 23–29)
CREAT SERPL-MCNC: 1 MG/DL (ref 0.5–1.4)
DIFFERENTIAL METHOD BLD: ABNORMAL
EOSINOPHIL # BLD AUTO: 0.2 K/UL (ref 0–0.5)
EOSINOPHIL NFR BLD: 4.5 % (ref 0–8)
ERYTHROCYTE [DISTWIDTH] IN BLOOD BY AUTOMATED COUNT: 13 % (ref 11.5–14.5)
EST. GFR  (NO RACE VARIABLE): >60 ML/MIN/1.73 M^2
GLUCOSE SERPL-MCNC: 83 MG/DL (ref 70–110)
HCT VFR BLD AUTO: 45.6 % (ref 40–54)
HGB BLD-MCNC: 15 G/DL (ref 14–18)
IMM GRANULOCYTES # BLD AUTO: 0.01 K/UL (ref 0–0.04)
IMM GRANULOCYTES NFR BLD AUTO: 0.2 % (ref 0–0.5)
LYMPHOCYTES # BLD AUTO: 1.5 K/UL (ref 1–4.8)
LYMPHOCYTES NFR BLD: 34.7 % (ref 18–48)
MCH RBC QN AUTO: 29.8 PG (ref 27–31)
MCHC RBC AUTO-ENTMCNC: 32.9 G/DL (ref 32–36)
MCV RBC AUTO: 91 FL (ref 82–98)
MONOCYTES # BLD AUTO: 0.3 K/UL (ref 0.3–1)
MONOCYTES NFR BLD: 7.4 % (ref 4–15)
NEUTROPHILS # BLD AUTO: 2.2 K/UL (ref 1.8–7.7)
NEUTROPHILS NFR BLD: 52.5 % (ref 38–73)
NRBC BLD-RTO: 0 /100 WBC
PLATELET # BLD AUTO: 265 K/UL (ref 150–450)
PMV BLD AUTO: 8.8 FL (ref 9.2–12.9)
POTASSIUM SERPL-SCNC: 3.7 MMOL/L (ref 3.5–5.1)
RBC # BLD AUTO: 5.04 M/UL (ref 4.6–6.2)
SODIUM SERPL-SCNC: 139 MMOL/L (ref 136–145)
WBC # BLD AUTO: 4.18 K/UL (ref 3.9–12.7)

## 2024-10-08 PROCEDURE — 95819 EEG AWAKE AND ASLEEP: CPT | Mod: 26,,, | Performed by: INTERNAL MEDICINE

## 2024-10-08 PROCEDURE — 95819 EEG AWAKE AND ASLEEP: CPT

## 2024-10-08 PROCEDURE — 36415 COLL VENOUS BLD VENIPUNCTURE: CPT | Performed by: INTERNAL MEDICINE

## 2024-10-08 PROCEDURE — 25000003 PHARM REV CODE 250: Performed by: INTERNAL MEDICINE

## 2024-10-08 PROCEDURE — 63600175 PHARM REV CODE 636 W HCPCS: Performed by: INTERNAL MEDICINE

## 2024-10-08 PROCEDURE — 99233 SBSQ HOSP IP/OBS HIGH 50: CPT | Mod: ,,, | Performed by: INTERNAL MEDICINE

## 2024-10-08 PROCEDURE — 80048 BASIC METABOLIC PNL TOTAL CA: CPT | Performed by: INTERNAL MEDICINE

## 2024-10-08 PROCEDURE — 85025 COMPLETE CBC W/AUTO DIFF WBC: CPT | Performed by: INTERNAL MEDICINE

## 2024-10-08 RX ORDER — AMLODIPINE BESYLATE 5 MG/1
5 TABLET ORAL DAILY
Qty: 30 TABLET | Refills: 2 | Status: SHIPPED | OUTPATIENT
Start: 2024-10-08 | End: 2025-01-06

## 2024-10-08 RX ORDER — LEVETIRACETAM 750 MG/1
750 TABLET ORAL EVERY 12 HOURS
Qty: 60 TABLET | Refills: 2 | Status: SHIPPED | OUTPATIENT
Start: 2024-10-08 | End: 2025-01-06

## 2024-10-08 RX ADMIN — LEVETIRACETAM 750 MG: 100 INJECTION, SOLUTION INTRAVENOUS at 09:10

## 2024-10-08 RX ADMIN — POLYETHYLENE GLYCOL 3350 17 G: 17 POWDER, FOR SOLUTION ORAL at 09:10

## 2024-10-08 RX ADMIN — AMLODIPINE BESYLATE 5 MG: 5 TABLET ORAL at 09:10

## 2024-10-08 RX ADMIN — ATORVASTATIN CALCIUM 20 MG: 10 TABLET, FILM COATED ORAL at 09:10

## 2024-10-08 NOTE — PLAN OF CARE
Case Management Final Discharge Note      Discharge Disposition: Home    New DME ordered / company name: None    Relevant SDOH / Transition of Care Barriers:  None    Primary Caretaker and contact information: Patient is independent; Melba Atkinson (Spouse)  [phone]        Scheduled followup appointment: Patient's PCP clinic with contact him to schedule follow up appointment; in-basket message sent to Neurology Clinic to schedule follow up appointment    Referrals placed: None    Transportation: Private vehicle/family taking patient home        Patient and family educated on discharge services and updated on DC plan.  Patient to discharge home; he is independent but lives at home with his spouse who is able to provide support as needed.  CM unable to schedule PCP follow up; PCP clinic's SSC sent a message to pt's PCP to contact him to schedule hospital follow up.  Bedside RN notified, patient clear to discharge from Case Management Perspective.    10/08/24 1359   Final Note   Assessment Type Final Discharge Note   Anticipated Discharge Disposition Home   Hospital Resources/Appts/Education Provided Provided patient/caregiver with written discharge plan information;Appointments scheduled and added to AVS   Post-Acute Status   Coverage  -  [employer] -   Discharge Delays None known at this time

## 2024-10-08 NOTE — TELEPHONE ENCOUNTER
Called patient there was no answer, left message to call [phone] and schedule appointment for 10/14 at 2:40PM .

## 2024-10-08 NOTE — PROGRESS NOTES
[facility] [place]  Cardiology  Progress Note    Patient Name: Pradeep Ro  MRN: [id number]  Admission Date: 10/5/2024  Hospital Length of Stay: 1 days  Code Status: Full Code   Attending Physician: Jose Juan Bautista DO   Primary Care Physician: Mauro Gilmore MD  Expected Discharge Date:   Principal Problem:LOC (loss of consciousness)    Subjective:     Interval Hx: no cp/sob, no seizures.  Cath yesterday normal.  No radial access site complications.    Tele: SR 70s (personally reviewed and interpreted)      Past Medical History:   Diagnosis Date    Hyperlipidemia, unspecified 02/21/2024    JUAN RAMON (obstructive sleep apnea) 02/24/2024    Primary hypertension 10/6/2024       Past Surgical History:   Procedure Laterality Date    ANGIOGRAM, CORONARY, WITH LEFT HEART CATHETERIZATION Left 10/7/2024    Procedure: Angiogram, Coronary, with Left Heart Cath;  Surgeon: Konstantin Peres MD;  Location: [facility] CATH LAB;  Service: Cardiology;  Laterality: Left;  not before 9am, R rad access       Review of patient's allergies indicates:  No Known Allergies    No current facility-administered medications on file prior to encounter.     Current Outpatient Medications on File Prior to Encounter   Medication Sig    acetaminophen (TYLENOL) 500 MG tablet Take 1-2 tablets (500-1,000 mg total) by mouth 3 (three) times daily as needed for Pain.    amLODIPine (NORVASC) 5 MG tablet Take 1 tablet (5 mg total) by mouth once daily.    atorvastatin (LIPITOR) 20 MG tablet Take with food/milk.Take or use exactly as directed.Obtain advice for OTCs.Do not take if pregnant.Avoid grapefruit and grapefruit juice.    gabapentin (NEURONTIN) 300 MG capsule Take 1 capsule (300 mg total) by mouth every evening. In 1 wk, if no relief, increase to twice daily.In another wk, may increase to 3 times.    meloxicam (MOBIC) 15 MG tablet Take 1 tablet (15 mg total) by mouth once daily.    orphenadrine (NORFLEX) 100 mg tablet Take 1 tablet (100 mg total) by mouth  every 12 (twelve) hours as needed.     Family History       Problem Relation (Age of Onset)    Heart disease Father, Paternal Grandfather    Hyperlipidemia Father, Paternal Grandfather          Tobacco Use    Smoking status: Never    Smokeless tobacco: Never   Substance and Sexual Activity    Alcohol use: Yes     Alcohol/week: 6.0 standard drinks of alcohol     Types: 3 Shots of liquor, 3 Drinks containing 0.5 oz of alcohol per week    Drug use: No    Sexual activity: Yes     Partners: Female     Birth control/protection: OCP     Review of Systems   Gastrointestinal:  Negative for melena.   Genitourinary:  Negative for hematuria.     Objective:     Vital Signs (Most Recent):  Temp: 98.6 °F (37 °C) (10/08/24 0755)  Pulse: 80 (10/08/24 0755)  Resp: 20 (10/08/24 0755)  BP: 119/62 (10/08/24 0755)  SpO2: 98 % (10/08/24 0755) Vital Signs (24h Range):  Temp:  [97.6 °F (36.4 °C)-98.8 °F (37.1 °C)] 98.6 °F (37 °C)  Pulse:  [63-85] 80  Resp:  [11-29] 20  SpO2:  [91 %-100 %] 98 %  BP: (107-166)/() 119/62     Weight: 99.4 kg (219 lb 2.2 oz)  Body mass index is 32.36 kg/m².    SpO2: 98 %         Intake/Output Summary (Last 24 hours) at 10/8/2024 0802  Last data filed at 10/7/2024 2009  Gross per 24 hour   Intake 1580 ml   Output 2400 ml   Net -820 ml       Lines/Drains/Airways       Peripheral Intravenous Line  Duration                  Peripheral IV - Single Lumen 10/05/24 2016 18 G Anterior;Distal;Left Upper Arm 2 days         Peripheral IV - Single Lumen 10/07/24 1023 20 G Anterior;Distal;Right Upper Arm <1 day                     Physical Exam  Constitutional:       General: He is not in acute distress.     Appearance: He is well-developed. He is obese. He is not ill-appearing, toxic-appearing or diaphoretic.   HENT:      Head: Normocephalic and atraumatic.   Eyes:      General: No scleral icterus.     Extraocular Movements: Extraocular movements intact.      Conjunctiva/sclera: Conjunctivae normal.      Pupils: Pupils  are equal, round, and reactive to light.   Neck:      Thyroid: No thyromegaly.      Vascular: No JVD.      Trachea: No tracheal deviation.   Cardiovascular:      Rate and Rhythm: Normal rate and regular rhythm.      Heart sounds: S1 normal and S2 normal. No murmur heard.     No friction rub. No gallop.      Comments: R rad access site c/d/i  Pulmonary:      Effort: Pulmonary effort is normal. No respiratory distress.      Breath sounds: Normal breath sounds. No stridor. No wheezing, rhonchi or rales.   Chest:      Chest wall: No tenderness.   Abdominal:      General: There is no distension.      Palpations: Abdomen is soft.   Musculoskeletal:         General: No swelling or tenderness. Normal range of motion.      Cervical back: Normal range of motion and neck supple. No rigidity.      Right lower leg: No edema.      Left lower leg: No edema.   Skin:     General: Skin is warm and dry.      Coloration: Skin is not jaundiced.   Neurological:      General: No focal deficit present.      Mental Status: He is alert and oriented to person, place, and time.      Cranial Nerves: No cranial nerve deficit.   Psychiatric:         Mood and Affect: Mood normal.         Behavior: Behavior normal.          Current Medications:   amLODIPine  5 mg Oral Daily    atorvastatin  20 mg Oral Daily    gabapentin  300 mg Oral QHS    levETIRAcetam (Keppra) IV (PEDS and ADULTS)  750 mg Intravenous Q12H    polyethylene glycol  17 g Oral Daily         Current Facility-Administered Medications:     acetaminophen, 650 mg, Oral, Q4H PRN    acetaminophen, 650 mg, Oral, Q4H PRN    aluminum-magnesium hydroxide-simethicone, 30 mL, Oral, QID PRN    atropine, 0.5 mg, Intravenous, PRN    bisacodyL, 10 mg, Rectal, Daily PRN    dextrose 10%, 12.5 g, Intravenous, PRN    dextrose 10%, 25 g, Intravenous, PRN    glucagon (human recombinant), 1 mg, Intramuscular, PRN    glucose, 16 g, Oral, PRN    glucose, 24 g, Oral, PRN    HYDROcodone-acetaminophen, 1 tablet,  Oral, Q6H PRN    HYDROcodone-acetaminophen, 1 tablet, Oral, Q4H PRN    lorazepam, 2 mg, Intravenous, Daily PRN    magnesium oxide, 800 mg, Oral, PRN    magnesium oxide, 800 mg, Oral, PRN    morphine, 2 mg, Intravenous, Q10 Min PRN    naloxone, 0.02 mg, Intravenous, PRN    ondansetron, 8 mg, Oral, Q8H PRN    ondansetron, 4 mg, Intravenous, Q6H PRN    potassium bicarbonate, 35 mEq, Oral, PRN    potassium bicarbonate, 50 mEq, Oral, PRN    potassium bicarbonate, 60 mEq, Oral, PRN    potassium, sodium phosphates, 2 packet, Oral, PRN    potassium, sodium phosphates, 2 packet, Oral, PRN    prochlorperazine, 5 mg, Intravenous, Q6H PRN    sodium chloride 0.9%, 250 mL, Intravenous, PRN    sodium chloride 0.9%, 10 mL, Intravenous, Q12H PRN    sodium chloride 0.9%, 10 mL, Intravenous, PRN    Laboratory (all labs reviewed):  CBC:  Recent Labs   Lab 10/04/24  1028 10/05/24  2049 10/06/24  0508 10/07/24  0538 10/08/24  0636   WBC 3.73 L 13.67 H 10.52 5.87 4.18   Hemoglobin 15.8 14.4 14.5 14.0 15.0   Hematocrit 48.3 42.7 44.6 43.8 45.6   Platelets 277 230 270 259 265       CHEMISTRIES:  Recent Labs   Lab 10/04/24  1028 10/05/24  2049 10/06/24  0508 10/07/24  0538 10/08/24  0636   Glucose 82 99 104 88 83   Sodium 140 142 141 140 139   Potassium 3.8 3.0 L 3.7 3.9 3.7   BUN 10 10 8 9 10   Creatinine 1.1 1.1 1.2 1.2 1.0   eGFR >60 >60 >60 >60 >60   Calcium 9.1 7.4 L 9.0 8.8 8.9       CARDIAC BIOMARKERS:  Recent Labs   Lab 10/05/24  2049 10/05/24  2308 10/06/24  0130 10/06/24  0508 10/06/24  0932    H  --   --   --   --    Troponin I 0.053 H 0.147 H 0.142 H 0.111 H 0.091 H       COAGS:        LIPIDS/LFTS:  Recent Labs   Lab 02/26/24  0830 10/04/24  1028 10/05/24  2049 10/06/24  0508   Cholesterol 229 H  --   --  208 H   Triglycerides 87  --   --  62   HDL 40  --   --  39 L   LDL Cholesterol 171.6 H  --   --  156.6   Non-HDL Cholesterol 189  --   --  169   AST 23 20 18  --    ALT 23 21 20  --        BNP:        TSH:  Recent Labs    Lab 02/26/24  0830   TSH 0.900       Free T4:  Recent Labs   Lab 02/26/24  0830   Free T4 0.82       Diagnostic Results:  ECG (personally reviewed and interpreted tracing(s)):  10/5/24 2046 SR 87    Chest X-Ray (personally reviewed and interpreted image(s)): 10/5/24 NAD    CTA Chest/Abd 10/6/24   1. No evidence of aortic aneurysm or dissection.  2. No acute intrathoracic or intra-abdominal abnormalities identified.    Cath 10/7/24 (images personally reviewed and interpreted)  LVEDP: 3mmHg  LVEF: 55% by echo  Dominance: Right  LM: normal  LAD: normal  LCx: normal  RCA: normal  Hemostasis:  R Radial band  Impression:  Syncope with elev trop, ?seizure.  Normal cors.  Normal LV fxn by echo.  R rad vasband.  Plan:  Stop ASA/Plavix.  Cont med rx/neuro workup.  No further inpat cardiac testing.    Echo: 10/6/24 (images personally reviewed and interpreted)    Left Ventricle: The left ventricle is normal in size. Mildly increased wall thickness. There is mild concentric hypertrophy. There is normal systolic function with a visually estimated ejection fraction of 55 - 60%. There is normal diastolic function.    Right Ventricle: Normal right ventricular cavity size. Systolic function is normal.    Pulmonary Artery: The estimated pulmonary artery systolic pressure is 16 mmHg.        Assessment and Plan:     * LOC (loss of consciousness)  The patient presents after being found confused on the bathroom floor, presumed syncope and possible seizure.    The patient has a history of seizure, last seizure being 10 years ago.    EKGs normal.    Echocardiogram normal  Cath normal (elev trop noted).  Neuro eval ongoing.    Elevated troponin  Troponin 0.05-> 0.1.    No overt anginal symptoms.    EKG normal.    Echo/Cath normal.  No Further cardiac testing planned.      Primary hypertension  Cont med rx    Class 1 obesity due to excess calories without serious comorbidity in adult  Body mass index is 32.36 kg/m². Morbid obesity  complicates all aspects of disease management from diagnostic modalities to treatment. Weight loss encouraged and health benefits explained to patient.         Mixed hyperlipidemia  Agree with statin rx  Check lipids/LFT 6 months (Apr 2025)        VTE Risk Mitigation (From admission, onward)           Ordered     IP VTE HIGH RISK PATIENT  Once         10/06/24 0217     Place sequential compression device  Until discontinued         10/06/24 0217                  Cardiology will sign off, pls call with questions.    Konstantin Peres MD  Cardiology  [facility]

## 2024-10-08 NOTE — TELEPHONE ENCOUNTER
----- Message from Gudelia sent at 10/8/2024  1:59 PM CDT -----  Contact: Pradeep   The nurse from [facility] would like to schedule a hospital follow up appt for Pradeep COLIN was not able to schedule

## 2024-10-08 NOTE — PLAN OF CARE
Problem: Adult Inpatient Plan of Care  Goal: Plan of Care Review  Outcome: Progressing  Flowsheets (Taken 10/8/2024 0505)  Plan of Care Reviewed With: patient  Goal: Absence of Hospital-Acquired Illness or Injury  Outcome: Progressing  Intervention: Identify and Manage Fall Risk  Flowsheets (Taken 10/8/2024 0505)  Safety Promotion/Fall Prevention:   assistive device/personal item within reach   bed alarm set   Fall Risk reviewed with patient/family   nonskid shoes/socks when out of bed   room near unit station   side rails raised x 2  Intervention: Prevent Skin Injury  Flowsheets (Taken 10/8/2024 0505)  Body Position: position changed independently  Skin Protection: incontinence pads utilized  Device Skin Pressure Protection:   adhesive use limited   absorbent pad utilized/changed   positioning supports utilized  Intervention: Prevent and Manage VTE (Venous Thromboembolism) Risk  Flowsheets (Taken 10/8/2024 0505)  VTE Prevention/Management:   bleeding risk assessed   ambulation promoted  Intervention: Prevent Infection  Flowsheets (Taken 10/8/2024 0505)  Infection Prevention:   single patient room provided   rest/sleep promoted     Patient is AAOx4. On room air. Tele maintained. No falls or injuries reported during shift, safety precautions maintained.

## 2024-10-08 NOTE — ASSESSMENT & PLAN NOTE
Troponin 0.05-> 0.1.    No overt anginal symptoms.    EKG normal.    Echo/Cath normal.  No Further cardiac testing planned.

## 2024-10-08 NOTE — HOSPITAL COURSE
Interval Hx: no cp/sob, no seizures.  Cath yesterday normal.  No radial access site complications.    Tele: SR 70s (personally reviewed and interpreted)

## 2024-10-08 NOTE — NURSING
[facility]  Nurses Note -- 4 Eyes      10/7/2024       Skin assessed on: Q Shift      [x] No Pressure Injuries Present    []Prevention Measures Documented    [] Yes LDA  for Pressure Injury Previously documented     [] Yes New Pressure Injury Discovered   [] LDA for New Pressure Injury Added      Attending RN:  Shon Cutler RN     Second RN:  Sabine Amin RN

## 2024-10-08 NOTE — PROGRESS NOTES
[facility] [place] [facility]metry  Neurology Consult Note    Patient Name: Pradeep Ro  Age: 34 y.o.  MRN: [id number]  Admission Date: 10/5/2024  Reason for consult:  Seizure v/s syncope    CC:  Found down    HPI:   Pradeep Ro is a 34 y.o. year old male with significant past medical history of hypertension, JUAN RAMON, hyperlipidemia presenting to the ER yesterday after being found down.  History obtained from patient and his wife on the phone as well as chart review.    Patient states that he got up from his bed and went to the bathroom.  He only remembers sitting on the toilet and the next thing he remembers is being on the floor.  Wife states that she did not see the beginning of the event but when she got to the bathroom, patient had lost consciousness while she was trying to enter the bathroom.  Noted blood on the floor coming from his mouth as well as tongue bite and noted as well.  Wife says that patient likely bit his tongue but also hit his face to the bathtub bursting his lips open.  While on the floor, she noticed that when he started regaining consciousness, he had a glazed staring episode for a few seconds and then he was slowly coming back to consciousness but seemed confused and was repeating questions.  She called EMS and patient was almost returning back to baseline.  While in the ambulance, patient had a another episode of staring episode but he quickly broke out of it followed by confusion without any administration of benzos.    In the ER, /91 otherwise vitals normal.  Patient was noted to be slightly confused and mild oral trauma.  Initial blood work concerning for WBC 13.67, troponin 0.053, potassium 3, calcium 7.4, .  CT head was obtained which was unremarkable.  Interestingly patient had presented to the ER 1 day prior on 10/04 for nonspecific left-sided flank pain at which time his calcium was 9.1.  Given the troponin leak, cardiology was consulted as well plan for cardiac angiogram for today.   Neurology was consulted seizure evaluation.    Patient states that he has had 1 episode of LOC 10 years back at which time there was a concern for seizure.  He was however not placed on seizure medication at that time given solitary unwitnessed event.  Denies any family history of epilepsy on father's side, unsure of his maternal side.  Denies febrile seizures.    10/08:  No acute overnight events.  MRI completed.      Histories:  Allergies:  Patient has no known allergies.    Current Medications:    Current Facility-Administered Medications   Medication Dose Route Frequency Provider Last Rate Last Admin    acetaminophen tablet 650 mg  650 mg Oral Q4H PRN Konstantin Peres MD        acetaminophen tablet 650 mg  650 mg Oral Q4H PRN Konstantin Peres MD        aluminum-magnesium hydroxide-simethicone 200-200-20 mg/5 mL suspension 30 mL  30 mL Oral QID PRN Konstantin Peres MD        amLODIPine tablet 5 mg  5 mg Oral Daily Konstantin Peres MD   5 mg at 10/08/24 0949    atorvastatin tablet 20 mg  20 mg Oral Daily Konstantin Peres MD   20 mg at 10/08/24 0949    atropine injection 0.5 mg  0.5 mg Intravenous PRN Konstantin Peres MD        bisacodyL suppository 10 mg  10 mg Rectal Daily PRN Konstantin Peres MD        dextrose 10% bolus 125 mL 125 mL  12.5 g Intravenous PRN Konstantin Peres MD        dextrose 10% bolus 250 mL 250 mL  25 g Intravenous PRN Konstantin Peres MD        gabapentin capsule 300 mg  300 mg Oral QHS Konstantin Peres MD   300 mg at 10/07/24 2004    glucagon (human recombinant) injection 1 mg  1 mg Intramuscular PRN Konstantin Peres MD        glucose chewable tablet 16 g  16 g Oral PRN Konstantin Peres MD        glucose chewable tablet 24 g  24 g Oral PRN Konstantin Peres MD        HYDROcodone-acetaminophen 5-325 mg per tablet 1 tablet  1 tablet Oral Q6H PRN Konstantin Peres MD   1 tablet at 10/06/24 2205    HYDROcodone-acetaminophen 5-325 mg per  tablet 1 tablet  1 tablet Oral Q4H PRN Konstantin Peres MD        levETIRAcetam injection 750 mg  750 mg Intravenous Q12H Konstantin Peres MD   750 mg at 10/08/24 0953    LORazepam injection 2 mg  2 mg Intravenous Daily PRN Thelma Salcedo PA-C        magnesium oxide tablet 800 mg  800 mg Oral PRN Konstantin Peres MD        magnesium oxide tablet 800 mg  800 mg Oral PRN [place], Konstantin CALVO MD        naloxone 0.4 mg/mL injection 0.02 mg  0.02 mg Intravenous PRN Konstantin Peres MD        ondansetron injection 4 mg  4 mg Intravenous Q6H PRN Konstantin Peres MD   4 mg at 10/06/24 2205    polyethylene glycol packet 17 g  17 g Oral Daily [place] MD   17 g at 10/08/24 0950    potassium bicarbonate disintegrating tablet 35 mEq  35 mEq Oral PRN [place] MD        potassium bicarbonate disintegrating tablet 50 mEq  50 mEq Oral PRN [place] MD        potassium bicarbonate disintegrating tablet 60 mEq  60 mEq Oral PRN [place] MD        potassium, sodium phosphates 280-160-250 mg packet 2 packet  2 packet Oral PRN [place] MD        potassium, sodium phosphates 280-160-250 mg packet 2 packet  2 packet Oral PRN [place] MD        prochlorperazine injection Soln 5 mg  5 mg Intravenous Q6H PRN Konstantin Peres MD   5 mg at 10/06/24 0247    sodium chloride 0.9% bolus 250 mL 250 mL  250 mL Intravenous PRN Konstantin Peres MD        sodium chloride 0.9% flush 10 mL  10 mL Intravenous Q12H PRN Konstantin Peres MD        sodium chloride 0.9% flush 10 mL  10 mL Intravenous PRN Konstantin Peres MD           Medical:   Past Medical History:   Diagnosis Date    Hyperlipidemia, unspecified 02/21/2024    JUAN RAMON (obstructive sleep apnea) 02/24/2024    Primary hypertension 10/6/2024        Surgeries:   Past Surgical History:   Procedure Laterality Date    ANGIOGRAM, CORONARY, WITH LEFT HEART CATHETERIZATION Left 10/7/2024    Procedure:  "Angiogram, Coronary, with Left Heart Cath;  Surgeon: Konstantin Peres MD;  Location: [facility] CATH LAB;  Service: Cardiology;  Laterality: Left;  not before 9am, R rad access        Family:   Family History   Problem Relation Name Age of Onset    Heart disease Father      Hyperlipidemia Father      Heart disease Paternal Grandfather      Hyperlipidemia Paternal Grandfather         Tobacco Use    Smoking status: Never    Smokeless tobacco: Never   Substance and Sexual Activity    Alcohol use: Yes     Alcohol/week: 6.0 standard drinks of alcohol     Types: 3 Shots of liquor, 3 Drinks containing 0.5 oz of alcohol per week    Drug use: No    Sexual activity: Yes     Partners: Female     Birth control/protection: OCP         Physical Exam:     Physical Examination  /62   Pulse 80   Temp 98.6 °F (37 °C)   Resp 20   Ht 5' 9" (1.753 m)   Wt 99.4 kg (219 lb 2.2 oz)   SpO2 98%   BMI 32.36 kg/m²   Body mass index is 32.36 kg/m².    Neurological Exam  Mental Status:   Alert and oriented to name, date, location  Recent/remote memory, registration, attention span/concentration, fund of knowledge intact by history.    CN:   II, III, IV, VI: PERRL, EOMI, no nystagmus, fundus not visualized due to inadequate dilation.V: intact to fine touch, temperature, pain.VII: symmetrical facial movement, nice smile, no drooping.VIII: grossly intact to hearing XII: tongue midline    Motor:   5/5 in all 4 extremities, no drift                Reflexes:   No Clonus, down going toes b/l.    Sensation: on both UEs and LEs    Intact to all modalities tested    Coordination:    Tremor: Absent.    Gait:    Not tested      Significant Labs:   Recent Lab Results         10/08/24  0636        Anion Gap 8       Baso # 0.03       Basophil % 0.7       BUN 10       Calcium 8.9       Chloride 108       CO2 23       Creatinine 1.0       Differential Method Automated       eGFR >60       Eos # 0.2       Eos % 4.5       Glucose 83       Gran # (ANC) " 2.2       Gran % 52.5       Hematocrit 45.6       Hemoglobin 15.0       Immature Grans (Abs) 0.01  Comment: Mild elevation in immature granulocytes is non specific and   can be seen in a variety of conditions including stress response,   acute inflammation, trauma and pregnancy. Correlation with other   laboratory and clinical findings is essential.         Immature Granulocytes 0.2       Lymph # 1.5       Lymph % 34.7       MCH 29.8       MCHC 32.9       MCV 91       Mono # 0.3       Mono % 7.4       MPV 8.8       nRBC 0       Platelet Count 265       Potassium 3.7       RBC 5.04       RDW 13.0       Sodium 139       WBC 4.18               Significant Imaging:   Images were personally reviewed and interpreted:   CT head without contrast: NAICA    MRI brain w wo contrast:  Epilepsy protocol NAICA    EEG:  Normal awake and asleep    Assessment and Plan:   34 y.o. year old male with significant past medical history of hypertension, JUAN RAMON, hyperlipidemia presenting to the ER yesterday after being found down.  Description from the wife concerning for clusters of episodes of focal seizure with impaired awareness.  Likely had secondary generalization which was unwitnessed.  Patient has had one prior episode with unclear description however not been placed on ASM at that time.    Etiology is unclear at this time.  Epilepsy workup unremarkable.  Calcium level 7.4 on the day of presentation, however was normal day prior and back to normal level day after without any intervention, likely lab error.  Given the solitary past episode and concern for clustering of events this time without any known significant trigger, we will place patient on antiseizure medication    Plan:   - discharged on 750 mg b.i.d. for maintenance  - p.r.n. Ativan 2 mg IV for seizure lasting for more than 2 minutes  - driving restriction explained to patient and wife for 6 months from last seizure per [facility] State law.   - kindly correct  metabolic/infectious derangements per Hospital Medicine.  Unclear if hypocalcemia needs to be worked up  - went over other safety precautions, first aid for epilepsy and instruction to return to ER.  - follow up in Neurology Clinic as outpatient    Thank you for your consult. I will sign off. Please contact us if you have any additional questions.    Time spent on this encounter: 30 minutes. This includes face to face time and non-face to face time preparing to see the patient (eg, review of tests), obtaining and/or reviewing separately obtained history, documenting clinical information in the electronic or other health record, independently interpreting results and communicating results to the patient/family/caregiver, or care coordinator.     Delicia Parks MD  Neurology  [facility]

## 2024-10-08 NOTE — NURSING
PER handoff received from Shon, Pt laying in bed awake. NAD noted. No C/O pain.  Fall and safety precaution maintained. Bed alarm activated and audible.Bed loacked in lowest position, with side rails up x2. Call bell and personal items within reach.

## 2024-10-08 NOTE — PROGRESS NOTES
AVS virtually reviewed with patient in its entirety with emphasis on diet, medications, follow-up appointments and reasons to return to the ED or contact the [facility] Line. Patient also encouraged to utilize their patient portal. Ease and convenience of use reiterated. Education complete and patient voiced understanding. All questions answered. Discharge teaching complete. Encouraged to complete patient survey.  Active with patient portal.   Messaged physician about continuing gabapentin per pt. Request.  Physician ok with patient continuing medication.  Pt. Requested letter to return to work.  Letter written and provided to patient.

## 2024-10-08 NOTE — PROCEDURES
EEG Extended Monitoring up to one hour    Date/Time: 10/8/2024 12:45 PM    Performed by: Delicia Parks MD  Authorized by: Konstantin Peres MD        ELECTROENCEPHALOGRAM REPORT    DATE OF SERVICE: 10/08/2024  EEG NUMBER: OW   LOCATION OF SERVICE: [facility]   Electroencephalographic (EEG) recording is with electrodes placed according to the International 10-20 placement system.  Thirty two (32) channels of digital signal (sampling rate of 512/sec) including T1 and T2 was simultaneously recorded from the scalp and may include  EKG, EMG, and/or eye monitors.  Recording band pass was 0.1 to 512 hz.  Digital video recording of the patient is simultaneously recorded with the EEG.  The patient is instructed report clinical symptoms which may occur during the recording session.  EEG and video recording is stored and archived in digital format. Activation procedures which include photic stimulation, hyperventilation and instructing patients to perform simple task are done in selected patients.    The EEG is displayed on a monitor screen and can be reviewed using different montages.  Computer assisted analysis is employed to detect spike and electrographic seizure activity.   The entire record is submitted for computer analysis.  The entire recording is visually reviewed and the times identified by computer analysis as being spikes or seizures are reviewed again.  Compresses spectral analysis (CSA) is also performed on the activity recorded from each individual channel.  This is displayed as a power display of frequencies from 0 to 30 Hz over time.   The CSA is reviewed looking for asymmetries in power between homologous areas of the scalp and then compared with the original EEG recording.     [employer] software was also utilized in the review of this study.  This software suite analyzes the EEG recording in multiple domains.  Coherence and rhythmicity is computed to identify EEG sections which may  contain organized seizures.  Each channel undergoes analysis to detect presence of spike and sharp waves which have special and morphological characteristic of epileptic activity.  The routine EEG recording is converted from spacial into frequency domain.  This is then displayed comparing homologous areas to identify areas of significant asymmetry.  Algorithm to identify non-cortically generated artifact is used to separate eye movement, EMG and other artifact from the EEG    EEG FINDINGS  During the maximally alert state, a 9-10 Hz posterior dominant rhythm was seen which was symmetrical, well-regulated and briskly attenuated to eye opening. In the more anterior head regions, symmetric frontocentral beta frequencies predominated. As drowsiness occurred, the posterior dominant rhythm attenuated, slow rolling eye movements appeared, and symmetrical vertex sharp transients were seen. Post were notes over the occipital regions bilaterally. Stage N2 sleep was reached and was characterized proc by high amplitude K-complexes and 12-15 Hz symmetrical and synchronous frontocentral sleep spindles.    No epileptiform findings were noted, no electrographic seizures were seen, and no clinical events were reported.    Activation Procedures were not performed.      IMPRESSION:  Normal awake and asleep    CLINICAL CORRELATION:  This is a normal EEG in awake and asleep states.  There were no epileptiform findings, electrographic seizures, or no clinical events recorded. An EEG without epileptiform findings does not exclude the possibility of epilepsy. If the clinical suspicion of epilepsy is high, a repeat EEG after sleep depreivation, following a seizure, or a prolonged EEG may increase the frequency of detecting epileptiform discharges.      Delicia Parks MD  Neurology  [facility] [place]

## 2024-10-08 NOTE — NURSING
Patient is not having a BM for 2 days already, offered PRN Bisacodyl suppository but patient refused despite explaining to him the importance of the medication.   Plan of care ongoing.

## 2024-10-08 NOTE — NURSING
Patient returned to room from MRI via wheelchair. Patient awake, alert, oriented without c/o discomfort. Tele monitoring in progress. No apparent distress noted at this time.

## 2024-10-08 NOTE — SUBJECTIVE & OBJECTIVE
Past Medical History:   Diagnosis Date    Hyperlipidemia, unspecified 02/21/2024    JUAN RAMON (obstructive sleep apnea) 02/24/2024    Primary hypertension 10/6/2024       Past Surgical History:   Procedure Laterality Date    ANGIOGRAM, CORONARY, WITH LEFT HEART CATHETERIZATION Left 10/7/2024    Procedure: Angiogram, Coronary, with Left Heart Cath;  Surgeon: Konstantin Peres MD;  Location: [facility] CATH LAB;  Service: Cardiology;  Laterality: Left;  not before 9am, R rad access       Review of patient's allergies indicates:  No Known Allergies    No current facility-administered medications on file prior to encounter.     Current Outpatient Medications on File Prior to Encounter   Medication Sig    acetaminophen (TYLENOL) 500 MG tablet Take 1-2 tablets (500-1,000 mg total) by mouth 3 (three) times daily as needed for Pain.    amLODIPine (NORVASC) 5 MG tablet Take 1 tablet (5 mg total) by mouth once daily.    atorvastatin (LIPITOR) 20 MG tablet Take with food/milk.Take or use exactly as directed.Obtain advice for OTCs.Do not take if pregnant.Avoid grapefruit and grapefruit juice.    gabapentin (NEURONTIN) 300 MG capsule Take 1 capsule (300 mg total) by mouth every evening. In 1 wk, if no relief, increase to twice daily.In another wk, may increase to 3 times.    meloxicam (MOBIC) 15 MG tablet Take 1 tablet (15 mg total) by mouth once daily.    orphenadrine (NORFLEX) 100 mg tablet Take 1 tablet (100 mg total) by mouth every 12 (twelve) hours as needed.     Family History       Problem Relation (Age of Onset)    Heart disease Father, Paternal Grandfather    Hyperlipidemia Father, Paternal Grandfather          Tobacco Use    Smoking status: Never    Smokeless tobacco: Never   Substance and Sexual Activity    Alcohol use: Yes     Alcohol/week: 6.0 standard drinks of alcohol     Types: 3 Shots of liquor, 3 Drinks containing 0.5 oz of alcohol per week    Drug use: No    Sexual activity: Yes     Partners: Female     Birth  control/protection: OCP     Review of Systems   Gastrointestinal:  Negative for melena.   Genitourinary:  Negative for hematuria.     Objective:     Vital Signs (Most Recent):  Temp: 98.6 °F (37 °C) (10/08/24 0755)  Pulse: 80 (10/08/24 0755)  Resp: 20 (10/08/24 0755)  BP: 119/62 (10/08/24 0755)  SpO2: 98 % (10/08/24 0755) Vital Signs (24h Range):  Temp:  [97.6 °F (36.4 °C)-98.8 °F (37.1 °C)] 98.6 °F (37 °C)  Pulse:  [63-85] 80  Resp:  [11-29] 20  SpO2:  [91 %-100 %] 98 %  BP: (107-166)/() 119/62     Weight: 99.4 kg (219 lb 2.2 oz)  Body mass index is 32.36 kg/m².    SpO2: 98 %         Intake/Output Summary (Last 24 hours) at 10/8/2024 0802  Last data filed at 10/7/2024 2009  Gross per 24 hour   Intake 1580 ml   Output 2400 ml   Net -820 ml       Lines/Drains/Airways       Peripheral Intravenous Line  Duration                  Peripheral IV - Single Lumen 10/05/24 2016 18 G Anterior;Distal;Left Upper Arm 2 days         Peripheral IV - Single Lumen 10/07/24 1023 20 G Anterior;Distal;Right Upper Arm <1 day                     Physical Exam  Constitutional:       General: He is not in acute distress.     Appearance: He is well-developed. He is obese. He is not ill-appearing, toxic-appearing or diaphoretic.   HENT:      Head: Normocephalic and atraumatic.   Eyes:      General: No scleral icterus.     Extraocular Movements: Extraocular movements intact.      Conjunctiva/sclera: Conjunctivae normal.      Pupils: Pupils are equal, round, and reactive to light.   Neck:      Thyroid: No thyromegaly.      Vascular: No JVD.      Trachea: No tracheal deviation.   Cardiovascular:      Rate and Rhythm: Normal rate and regular rhythm.      Heart sounds: S1 normal and S2 normal. No murmur heard.     No friction rub. No gallop.      Comments: R rad access site c/d/i  Pulmonary:      Effort: Pulmonary effort is normal. No respiratory distress.      Breath sounds: Normal breath sounds. No stridor. No wheezing, rhonchi or rales.    Chest:      Chest wall: No tenderness.   Abdominal:      General: There is no distension.      Palpations: Abdomen is soft.   Musculoskeletal:         General: No swelling or tenderness. Normal range of motion.      Cervical back: Normal range of motion and neck supple. No rigidity.      Right lower leg: No edema.      Left lower leg: No edema.   Skin:     General: Skin is warm and dry.      Coloration: Skin is not jaundiced.   Neurological:      General: No focal deficit present.      Mental Status: He is alert and oriented to person, place, and time.      Cranial Nerves: No cranial nerve deficit.   Psychiatric:         Mood and Affect: Mood normal.         Behavior: Behavior normal.          Current Medications:   amLODIPine  5 mg Oral Daily    atorvastatin  20 mg Oral Daily    gabapentin  300 mg Oral QHS    levETIRAcetam (Keppra) IV (PEDS and ADULTS)  750 mg Intravenous Q12H    polyethylene glycol  17 g Oral Daily         Current Facility-Administered Medications:     acetaminophen, 650 mg, Oral, Q4H PRN    acetaminophen, 650 mg, Oral, Q4H PRN    aluminum-magnesium hydroxide-simethicone, 30 mL, Oral, QID PRN    atropine, 0.5 mg, Intravenous, PRN    bisacodyL, 10 mg, Rectal, Daily PRN    dextrose 10%, 12.5 g, Intravenous, PRN    dextrose 10%, 25 g, Intravenous, PRN    glucagon (human recombinant), 1 mg, Intramuscular, PRN    glucose, 16 g, Oral, PRN    glucose, 24 g, Oral, PRN    HYDROcodone-acetaminophen, 1 tablet, Oral, Q6H PRN    HYDROcodone-acetaminophen, 1 tablet, Oral, Q4H PRN    lorazepam, 2 mg, Intravenous, Daily PRN    magnesium oxide, 800 mg, Oral, PRN    magnesium oxide, 800 mg, Oral, PRN    morphine, 2 mg, Intravenous, Q10 Min PRN    naloxone, 0.02 mg, Intravenous, PRN    ondansetron, 8 mg, Oral, Q8H PRN    ondansetron, 4 mg, Intravenous, Q6H PRN    potassium bicarbonate, 35 mEq, Oral, PRN    potassium bicarbonate, 50 mEq, Oral, PRN    potassium bicarbonate, 60 mEq, Oral, PRN    potassium, sodium  phosphates, 2 packet, Oral, PRN    potassium, sodium phosphates, 2 packet, Oral, PRN    prochlorperazine, 5 mg, Intravenous, Q6H PRN    sodium chloride 0.9%, 250 mL, Intravenous, PRN    sodium chloride 0.9%, 10 mL, Intravenous, Q12H PRN    sodium chloride 0.9%, 10 mL, Intravenous, PRN    Laboratory (all labs reviewed):  CBC:  Recent Labs   Lab 10/04/24  1028 10/05/24  2049 10/06/24  0508 10/07/24  0538 10/08/24  0636   WBC 3.73 L 13.67 H 10.52 5.87 4.18   Hemoglobin 15.8 14.4 14.5 14.0 15.0   Hematocrit 48.3 42.7 44.6 43.8 45.6   Platelets 277 230 270 259 265       CHEMISTRIES:  Recent Labs   Lab 10/04/24  1028 10/05/24  2049 10/06/24  0508 10/07/24  0538 10/08/24  0636   Glucose 82 99 104 88 83   Sodium 140 142 141 140 139   Potassium 3.8 3.0 L 3.7 3.9 3.7   BUN 10 10 8 9 10   Creatinine 1.1 1.1 1.2 1.2 1.0   eGFR >60 >60 >60 >60 >60   Calcium 9.1 7.4 L 9.0 8.8 8.9       CARDIAC BIOMARKERS:  Recent Labs   Lab 10/05/24  2049 10/05/24  2308 10/06/24  0130 10/06/24  0508 10/06/24  0932    H  --   --   --   --    Troponin I 0.053 H 0.147 H 0.142 H 0.111 H 0.091 H       COAGS:        LIPIDS/LFTS:  Recent Labs   Lab 02/26/24 0830 10/04/24  1028 10/05/24  2049 10/06/24  0508   Cholesterol 229 H  --   --  208 H   Triglycerides 87  --   --  62   HDL 40  --   --  39 L   LDL Cholesterol 171.6 H  --   --  156.6   Non-HDL Cholesterol 189  --   --  169   AST 23 20 18  --    ALT 23 21 20  --        BNP:        TSH:  Recent Labs   Lab 02/26/24 0830   TSH 0.900       Free T4:  Recent Labs   Lab 02/26/24  0830   Free T4 0.82       Diagnostic Results:  ECG (personally reviewed and interpreted tracing(s)):  10/5/24 2046 SR 87    Chest X-Ray (personally reviewed and interpreted image(s)): 10/5/24 NAD    CTA Chest/Abd 10/6/24   1. No evidence of aortic aneurysm or dissection.  2. No acute intrathoracic or intra-abdominal abnormalities identified.    Cath 10/7/24 (images personally reviewed and interpreted)  LVEDP: 3mmHg  LVEF:  55% by echo  Dominance: Right  LM: normal  LAD: normal  LCx: normal  RCA: normal  Hemostasis:  R Radial band  Impression:  Syncope with elev trop, ?seizure.  Normal cors.  Normal LV fxn by echo.  R rad terrance.  Plan:  Stop ASA/Plavix.  Cont med rx/neuro workup.  No further inpat cardiac testing.    Echo: 10/6/24 (images personally reviewed and interpreted)    Left Ventricle: The left ventricle is normal in size. Mildly increased wall thickness. There is mild concentric hypertrophy. There is normal systolic function with a visually estimated ejection fraction of 55 - 60%. There is normal diastolic function.    Right Ventricle: Normal right ventricular cavity size. Systolic function is normal.    Pulmonary Artery: The estimated pulmonary artery systolic pressure is 16 mmHg.

## 2024-10-08 NOTE — NURSING
[facility]  Nurses Note -- 4 Eyes      10/8/2024       Skin assessed on: Q Shift      [x] No Pressure Injuries Present    []Prevention Measures Documented    [] Yes LDA  for Pressure Injury Previously documented     [] Yes New Pressure Injury Discovered   [] LDA for New Pressure Injury Added      Attending RN:  Florinda Oneil LPN     Second RN:  Vinita Denis RN

## 2024-10-08 NOTE — DISCHARGE SUMMARY
[facility] Medicine  Discharge Summary      Patient Name: Pradeep Ro  MRN: [id number]  [facility]: [id number]  Patient Class: IP- Inpatient  Admission Date: 10/5/2024  Hospital Length of Stay: 1 days  Discharge Date and Time:  10/08/2024 1:22 PM  Attending Physician: Jose Juan Bautista DO   Discharging Provider: Hany Stephens Jr, NP  Primary Care Provider: Mauro Gilmore MD    Primary Care Team: HANY STEPHENS    HPI:   34 y.o. male with PMH of Hypertension and back pain presents to the ED for evaluation of altered mental status and fall.    Patient reports he went to the bathroom to have a bowel movement then does not remember what happened.  His wife found him a couple hours later when she came home on the bathroom floor with blood coming from his mouth and nose.    The patient was confused when he woke up but able to stand up and walk.  He arrived to the ED confused. Subsequently patient retunred to his baseline but afterwards reported back pain with radiates to the middle of his shoulder blades, feels like heaviness and radiates to his shoulders.  Patient attempted Tylenol 1,000 mg and Meloxicam at 6:25 am this morning as treatment/medication with slight relief. No other alleviating or exacerbating factors.Patient does not have family hx of MI but reports maybe that his father had heart issues. He does not have a presonal hx of similar chest pain with exertion and he is able to lift weight or go up and down stairs without chest pain.   Denies history of kidney stones. Denies tobacco use. Denies chest pain, SOB, leg swelling, abdominal pain, dysuria, frequency, or other associated symptoms.    In the ED he was found to have leucocytosis, hypokalemia, acidosis, hypocalcimia and troponin leak.  CT Angio negative for PE. His Hip, Shoulder and CXR were negative for acute pathology  CT Head and maxillofacial bones did not identify any fractures    Patient is admitted for ACS rule out      Procedure(s)  (LRB):  Angiogram, Coronary, with Left Heart Cath (Left)      Hospital Course:   Pradeep Ro placed in observation for further ACS versus seizure workup.  In the ED patient is slightly leukocytosis WBC 13.67, potassium 3.0, calcium 7.4, initial troponin 0.147 which down trended to 0.091, CTA chest abdomen pelvis with no evidence of aortic aneurysm or dissection or intra abdominal abnormality.  Chest x-ray of hip, shoulder, and chest with no acute pathology.  CT head and maxillofacial bones did not identify any fractures.  Cardiology was consulted and patient underwent LHC with normal CORS.  Cardiology included syncope and elevated troponin likely due to seizures and recommended no further inpatient cardiac testing.  Neurology was consulted for seizures vs syncope as etiology is unclear.  Epilepsy workup initiated with EEG and MRI brain epilepsy protocol.  Patient was loaded with Keppra 2 g and started on 750 mg b.i.d. for maintenance.  P.r.n. Ativan 2 mg IV order for seizures lasting more than 2 minutes.  Seizure precautions explained to patient in his wife and driving restrictions explained per [facility] state law.  Calcium was normal on October 4, 6, 7, and 8.  Hypocalcemia on Oct 5 presumed lab error.  MRI and EEG unremarkable.  Prescription for keppra 750 mg BID and refill of amlodipine sent to pharmacy.  All findings and plan discussed with patient, all questions answered, verbalizes understanding.  Discharged home in stable condition.      Goals of Care Treatment Preferences:  Code Status: Full Code      SDOH Screening:  The patient was screened for utility difficulties, food insecurity, transport difficulties, housing insecurity, and interpersonal safety and there were no concerns identified this admission.     Consults:   Consults (From admission, onward)          Status Ordering Provider     Inpatient consult to Neurology  Once        Provider:  Skyla Chauhan MD    Completed SHOWERS, GARRISON      Inpatient consult to Cardiology  Once        Provider:  Konstantin Peres MD    Completed CRISTIAN TUCKER            No new Assessment & Plan notes have been filed under this hospital service since the last note was generated.  Service: Hospital Medicine    Final Active Diagnoses:    Diagnosis Date Noted POA    PRINCIPAL PROBLEM:  LOC (loss of consciousness) [R40.20] 10/06/2024 Yes    Primary hypertension [I10] 10/06/2024 Yes    Class 1 obesity due to excess calories without serious comorbidity in adult [E66.811, E66.09] 10/06/2024 Yes    Mixed hyperlipidemia [E78.2] 02/21/2024 Yes      Problems Resolved During this Admission:    Diagnosis Date Noted Date Resolved POA    Acute midline thoracic back pain [M54.6] 10/06/2024 10/08/2024 Yes    Elevated troponin [R79.89] 10/06/2024 10/08/2024 Yes       Discharged Condition: stable    Disposition: Home or Self Care    Follow Up:   Follow-up Information       Muaro Gilmore MD .    Specialty: Internal Medicine  Contact information:  [address] [facility] [address]  [phone]               Delicia Parks MD Follow up.    Specialty: Neurology  Contact information:  [address] [facility] [address]   [facility] [id number] [phone]                           Patient Instructions:      Diet Cardiac     EKG 12-lead   Standing Status: Future Standing Exp. Date: 10/06/25     Activity as tolerated       Significant Diagnostic Studies: Labs: CMP   Recent Labs   Lab 10/07/24  0538 10/08/24  0636    139   K 3.9 3.7    108   CO2 27 23   GLU 88 83   BUN 9 10   CREATININE 1.2 1.0   CALCIUM 8.8 8.9   ANIONGAP 10 8    and CBC   Recent Labs   Lab 10/07/24  0538 10/08/24  0636   WBC 5.87 4.18   HGB 14.0 15.0   HCT 43.8 45.6    265       Pending Diagnostic Studies:       None           Medications:  Reconciled Home Medications:      Medication List        START taking these medications      levETIRAcetam 500 mg/5 mL injection  Commonly known as: Keppra  Inject 7.5  mLs (750 mg total) into the vein every 12 (twelve) hours.            CONTINUE taking these medications      acetaminophen 500 MG tablet  Commonly known as: TYLENOL  Take 1-2 tablets (500-1,000 mg total) by mouth 3 (three) times daily as needed for Pain.     amLODIPine 5 MG tablet  Commonly known as: NORVASC  Take 1 tablet (5 mg total) by mouth once daily.     atorvastatin 20 MG tablet  Commonly known as: LIPITOR  Take with food/milk.Take or use exactly as directed.Obtain advice for OTCs.Do not take if pregnant.Avoid grapefruit and grapefruit juice.     meloxicam 15 MG tablet  Commonly known as: MOBIC  Take 1 tablet (15 mg total) by mouth once daily.     orphenadrine 100 mg tablet  Commonly known as: NORFLEX  Take 1 tablet (100 mg total) by mouth every 12 (twelve) hours as needed.            STOP taking these medications      gabapentin 300 MG capsule  Commonly known as: NEURONTIN              Indwelling Lines/Drains at time of discharge:   Lines/Drains/Airways       None                   Time spent on the discharge of patient: 40 minutes         Hany Stephens Jr NP  Department of [facility] Medicine  [facility]

## 2024-10-11 ENCOUNTER — OFFICE VISIT (OUTPATIENT)
Dept: INTERNAL MEDICINE | Facility: CLINIC | Age: 34
End: 2024-10-11
Payer: OTHER GOVERNMENT

## 2024-10-11 VITALS
SYSTOLIC BLOOD PRESSURE: 122 MMHG | WEIGHT: 207 LBS | OXYGEN SATURATION: 96 % | BODY MASS INDEX: 30.66 KG/M2 | HEART RATE: 79 BPM | HEIGHT: 69 IN | DIASTOLIC BLOOD PRESSURE: 84 MMHG

## 2024-10-11 DIAGNOSIS — R56.9 SEIZURE-LIKE ACTIVITY: Primary | ICD-10-CM

## 2024-10-11 DIAGNOSIS — Z09 ENCOUNTER FOR EXAMINATION FOLLOWING TREATMENT AT HOSPITAL: ICD-10-CM

## 2024-10-11 DIAGNOSIS — R40.20 LOSS OF CONSCIOUSNESS: ICD-10-CM

## 2024-10-11 PROCEDURE — 99999 PR PBB SHADOW E&M-EST. PATIENT-LVL III: CPT | Mod: PBBFAC,,, | Performed by: FAMILY MEDICINE

## 2024-10-11 PROCEDURE — 99213 OFFICE O/P EST LOW 20 MIN: CPT | Mod: PBBFAC | Performed by: FAMILY MEDICINE

## 2024-10-11 PROCEDURE — 99214 OFFICE O/P EST MOD 30 MIN: CPT | Mod: S$PBB,,, | Performed by: FAMILY MEDICINE

## 2024-10-11 NOTE — LETTER
October 11, 2024      [facility]ran[facility] P[facility]  [address]  [facility] [address]  Phone: [phone]  Fax: [phone]       Patient: Pradeep Ro   YOB: 1990  Date of Visit: 10/11/2024    To Whom It May Concern:    Pradeep Ro  was at [facility] on 10/11/2024. He was recently treated for severe back pain as well as a head injury with concussion. He is undergoing post-injury work-up with a Neurology specialist that is currently pending. Due to the nature of his injury and risk for re-injury or exacerbation, I respectfully request that he be placed on temporary medical profile that would excuse from participation in physical fitness testing. This profile would need to include exemption from strenuous activity including, but not limited to running more than 100 meters/300 feet, lifting more than 50lbs, repetitive bending at the waist or neck. These limitations should be applied as relevant to the various portions of the physical fitness test. The duration of this exemption is expected to be temporary pending the clearance by Neurology to return to normal duty and activity. This is anticipated to be within the next 60 to 90 days. These limitations are not expected to be detrimental to long-term readiness and mobility requirements. If extension or curtailment of the exemption is needed, this would be at the discretion of the specialist and follow-up through my office. If you have any questions or concerns, or if I can be of further assistance, please do not hesitate to contact me.    Very Respectfully,        Mauro Gilmore MD, FAAFP  Family Medicine Physician  [facility]

## 2024-10-11 NOTE — PROGRESS NOTES
Transitional Care Note  Subjective:       Patient ID: Pradeep Ro is a 34 y.o. male.  Chief Complaint: Hospital Follow Up    Family and/or Caretaker present at visit?  Yes.  Diagnostic tests reviewed/disposition: I have reviewed all completed as well as pending diagnostic tests at the time of discharge.  Disease/illness education: No specific deficiencies in understanding of illness and hospitalization  Home health/community services discussion/referrals: Patient does not have home health established from hospital visit.  They do not need home health.  If needed, we will set up home health for the patient.   Establishment or re-establishment of referral orders for community resources: No other necessary community resources.   Discussion with other health care providers: No discussion with other health care providers necessary.     Pt was admitted at [facility] from 10/5-10/8. Seen in ED for AMS and fall. He was found down by his wife in the bathroom bleeding from mouth and nose. He was acutely confused but able to cooperate with commands. Once AMS had resolved, reported pain and sensation of heaviness.     Initial ED w/u showed leukocytosis of 13.67, hypokalemia at 3.0, hypocalcemia at 7.4, and a bump in troponin. CTA Chest/Abd/Pelv w/o acute abnormality. CXR, Hip XR, Shoulder XR w/o acute abnormality. CT Head and MF bones w/o trauma. Pt had LHC which was unremarkable. Condition determined to be non-cardiac in etiology. Neuro consulted for epilepsy concern. EEG and MRI brain were unremarkable. He was loaded on Keppra and started on 750mg BID outpatient dosing.     Neuro outpt f/u pending.  HPI  Review of Systems   Constitutional:  Negative for activity change and unexpected weight change.   HENT:  Negative for hearing loss, rhinorrhea and trouble swallowing.    Eyes:  Negative for discharge and visual disturbance.   Respiratory:  Negative for chest tightness and wheezing.    Cardiovascular:  Negative for chest  pain and palpitations.   Gastrointestinal:  Negative for blood in stool, constipation, diarrhea and vomiting.   Endocrine: Negative for polydipsia and polyuria.   Genitourinary:  Negative for difficulty urinating, hematuria and urgency.   Musculoskeletal:  Negative for arthralgias, joint swelling and neck pain.   Neurological:  Negative for weakness and headaches.   Psychiatric/Behavioral:  Negative for confusion and dysphoric mood.        Objective:      Physical Exam  Vitals reviewed.   Constitutional:       General: He is not in acute distress.     Appearance: Normal appearance. He is not ill-appearing.   HENT:      Head: Normocephalic and atraumatic.      Right Ear: External ear normal.      Left Ear: External ear normal.      Nose: Nose normal.   Cardiovascular:      Rate and Rhythm: Normal rate and regular rhythm.      Pulses: Normal pulses.      Heart sounds: Normal heart sounds. No murmur heard.     No friction rub. No gallop.   Pulmonary:      Effort: No respiratory distress.      Breath sounds: Normal breath sounds. No stridor. No wheezing, rhonchi or rales.   Abdominal:      General: Abdomen is flat. Bowel sounds are normal.      Palpations: Abdomen is soft.   Musculoskeletal:      Cervical back: Normal range of motion.   Neurological:      General: No focal deficit present.      Mental Status: He is alert and oriented to person, place, and time. Mental status is at baseline.      Cranial Nerves: No cranial nerve deficit, dysarthria or facial asymmetry.      Sensory: Sensation is intact. No sensory deficit.      Motor: Motor function is intact. No weakness.      Coordination: Coordination is intact. Coordination normal. Finger-Nose-Finger Test and Heel to Shin Test normal. Rapid alternating movements normal.      Deep Tendon Reflexes: Reflexes normal.   Psychiatric:         Mood and Affect: Mood normal.         Behavior: Behavior normal.         Thought Content: Thought content normal.         Judgment:  Judgment normal.         Assessment:       1. Seizure-like activity    2. Loss of consciousness        Plan:       1. Encounter for examination following treatment at hospital  Reviewed hospital course, including imaging, treatments, consults, discharge medications  Referrals and prescriptions are up-to-date     2. Seizure-like activity (Primary)  Difficult to definitively say this is diagnosis of epilepsy w/o confirmed witnessed or documented epileptic activity  Pt was found down and had confusion post-head injury - could be post-ictal but could also be sequela of concussive head injury and LOC  Ref to neurology for diagnosis or r/o  - Ambulatory referral/consult to Neurology; Future    3. Loss of consciousness  As above      Mauro Gilmore MD, FAAFP  Family Medicine Physician  [facility]  10/11/2024       This note was produced using voice recognition technology. Some typographical or syntax errors may be present, despite best efforts with proofreading.

## 2024-10-13 ENCOUNTER — PATIENT MESSAGE (OUTPATIENT)
Dept: PHYSICAL MEDICINE AND REHAB | Facility: CLINIC | Age: 34
End: 2024-10-13
Payer: OTHER GOVERNMENT

## 2024-10-18 ENCOUNTER — OFFICE VISIT (OUTPATIENT)
Dept: PHYSICAL MEDICINE AND REHAB | Facility: CLINIC | Age: 34
End: 2024-10-18
Payer: OTHER GOVERNMENT

## 2024-10-18 DIAGNOSIS — G89.29 CHRONIC NECK PAIN: ICD-10-CM

## 2024-10-18 DIAGNOSIS — M47.812 SPONDYLOSIS OF CERVICAL REGION WITHOUT MYELOPATHY OR RADICULOPATHY: ICD-10-CM

## 2024-10-18 DIAGNOSIS — E66.811 OBESITY (BMI 30.0-34.9): ICD-10-CM

## 2024-10-18 DIAGNOSIS — M47.26 OSTEOARTHRITIS OF SPINE WITH RADICULOPATHY, LUMBAR REGION: ICD-10-CM

## 2024-10-18 DIAGNOSIS — M54.2 CHRONIC NECK PAIN: ICD-10-CM

## 2024-10-18 DIAGNOSIS — M54.41 CHRONIC BILATERAL LOW BACK PAIN WITH BILATERAL SCIATICA: Primary | ICD-10-CM

## 2024-10-18 DIAGNOSIS — M54.42 CHRONIC BILATERAL LOW BACK PAIN WITH BILATERAL SCIATICA: Primary | ICD-10-CM

## 2024-10-18 DIAGNOSIS — G89.29 CHRONIC BILATERAL LOW BACK PAIN WITH BILATERAL SCIATICA: Primary | ICD-10-CM

## 2024-10-18 RX ORDER — IBUPROFEN 600 MG/1
600 TABLET ORAL 3 TIMES DAILY PRN
Qty: 90 TABLET | Refills: 2 | Status: SHIPPED | OUTPATIENT
Start: 2024-10-18

## 2024-10-18 RX ORDER — GABAPENTIN 300 MG/1
300 CAPSULE ORAL 2 TIMES DAILY
Qty: 60 CAPSULE | Refills: 0 | Status: SHIPPED | OUTPATIENT
Start: 2024-10-18 | End: 2024-11-17

## 2024-10-18 NOTE — PROGRESS NOTES
Subjective:       Patient ID: Pradeep Ro is a 34 y.o. male.    Chief Complaint: No chief complaint on file.    A telemedicine Virtual Visit is being done today.    The patient location is:  Office  The chief complaint leading to consultation is:  Back pain  Visit type: Audiovisual  Total time spent with patient:  40 min  Each patient to whom he or she provides medical services by telemedicine is:  (1) informed of the relationship between the physician and patient and the respective role of any other health care provider with respect to management of the patient; and (2) notified that he or she may decline to receive medical services by telemedicine and may withdraw from such care at any time.            HPI    Mr. Ro is a 33-year-old male with past medical history of hyperlipidemia, obesity, JUAN RAMON.  He was referred to the Physical Medicine Clinic presented on 2/26/2024 for chronic low back pain with bilateral radicular symptoms.  X-rays of the lumbar spine and MRI of the lumbar spine were ordered due to nerve root tension signs and possible upper motor neuron signs.  Was started on meloxicam, gabapentin and p.r.n. Tylenol.      X-rays of the lumbar spine were unremarkable.  MRI of the lumbar spine small central disc protrusion at L5-S1 with mild canal stenosis and no significant neural foraminal stenosis.  [place] the [place] [facility] [place] with altered mental status after a fall with loss of consciousness.  He had an extensive workup.  CT scan of the head was unremarkable for bleed.  CT scan of the neck showed no fractures but degenerative changes.  Neurology was consulted.  He was diagnosed with seizure disorder.  He was loaded and maintained on Keppra.  He has been stable after discharge.  He went back to work recently.    He is coming to the clinic for follow-up.  His low back pain has been stable.  It is a constant aching and soreness in the lower lumbar spine and across his back.  Has  occasional shooting pain to both lower extremities to the mid posterior thighs with throbbing sensations.  His pain is aggravated by prolonged sitting, heavy lifting and bending.  It is better with stretching and lying flat on his back.  His maximum pain is 7-8/10 and minimum 4/10.  Today it is 4/10.  He denies any lower extremity weakness.  He denies any leg numbness.  He denies any bowel or bladder incontinence.  He denies any leg claudications.      He started complaining of neck pain couple months ago.  He reports going to the emergency department at [facility].  He was given open a lidocaine shot in his neck close correlation rasta.  His neck pain has been recently under good control.  He continues to have shooting pain from his right neck and shoulder to the right wrist.  His pain is aggravated by activity.  He is 10/10 and minimum 6/10.  Today it is 6/10.  He reports mild upper extremity weakness.  He denies any impaired gait or hand coordination.      He is currently on:   Meloxicam 15 mg p.o. p.r.n., sometimes 3 tablets per day   He took ibuprofen for few days after dental procedures   Gabapentin 300 mg capsules twice per day    Past Medical History:   Diagnosis Date    Hyperlipidemia, unspecified 02/21/2024    JUAN RAMON (obstructive sleep apnea) 02/24/2024    Primary hypertension 10/6/2024        Review of patient's allergies indicates:  No Known Allergies     Review of Systems   Constitutional:  Negative for chills and fever.   Eyes:  Negative for visual disturbance.   Respiratory:  Negative for shortness of breath.    Cardiovascular:  Negative for chest pain.   Gastrointestinal:  Negative for blood in stool, nausea and vomiting.   Genitourinary:  Negative for difficulty urinating.   Musculoskeletal:  Positive for back pain. Negative for arthralgias, gait problem and neck pain.   Neurological:  Negative for dizziness, weakness and headaches.   Psychiatric/Behavioral:  Positive for sleep disturbance. Negative for  behavioral problems.              Objective:      Physical Exam  Vitals reviewed.   Constitutional:       General: He is not in acute distress.     Appearance: He is well-developed.   HENT:      Head: Normocephalic and atraumatic.   Musculoskeletal:      Cervical back: Normal range of motion. No tenderness.      Comments: BUE:  ROM:   RUE: full.   LUE: full.  Strength:    RUE: 5/5 at shoulder abduction, 5 elbow flexion, 5 elbow extension, 5 hand .   LUE: 5/5 at shoulder abduction, 5 elbow flexion, 5 elbow extension, 5 hand .  Sensation to pinprick:   RUE: intact.   LUE: intact.  DTR:    RUE: +2 biceps, +2 triceps.   LUE:  +2 biceps, +2 triceps.  Gimenez:   RUE: -ve.   LUE: -ve.    BLE:  ROM:   RLE: full.   LLE: full.  Knee crepitus:   RLE: -ve.   LLE: -ve.   Strength:    RLE: 5/5 at hip flexion, 5 knee extension, 5 ankle DF, 5 PF, 5 EHL.   LLE: 5/5 at hip flexion, 5 knee extension, 5 ankle DF, 5 PF, 5 EHL.  Sensation to pinprick:     RLE: intact.      LLE: intact.   DTR:     RLE: +1 knee, +2 ankle.    LLE: +2 knee, +2 ankle.  Clonus:    Rt ankle: -ve.    Lt ankle: +ve few beats.  SLR:      RLE: +ve at 60 degrees.      LLE: +ve at 60 degrees.   INDY:     RLE: -ve.      LLE: -ve.  -ve tenderness over lumbar spine.  No leg length discrepancy.    Directional Preference:  Spine flexion: 70 degrees , mild pain in back.  Spine extension: 10 degrees, mod pain in back.  Lateral bending: no pain to Right, no pain to Left.      Heel walking: WNL.  Toe walking: WNL.  Gait: WNL     Skin:     General: Skin is warm.   Neurological:      Mental Status: He is alert.   Psychiatric:         Behavior: Behavior normal.         Assessment:       1. Chronic bilateral low back pain with bilateral sciatica    2. Osteoarthritis of spine with radiculopathy, lumbar region    3. Chronic neck pain    4. Spondylosis of cervical region without myelopathy or radiculopathy    5. Obesity (BMI 30.0-34.9)        Plan:         - Discontinue  meloxicam (MOBIC) 15 MG tablet; Take 1 tablet (15 mg total) by mouth once daily.  - Start  ibuprofen (ADVIL,MOTRIN) 600 MG tablet; Take 1 tablet (600 mg total) by mouth 3 (three) times daily as needed for Pain.  - Continue gabapentin (NEURONTIN) 300 MG capsule; Take 1 capsule (300 mg total) by mouth 2 (two) times daily. In 1 wk, if no relief, increase to twice daily.In another wk, may increase to 3 times.  - Start acetaminophen (TYLENOL) 500 MG tablet; Take 1-2 tablets (500-1,000 mg total) by mouth 3 (three) times daily as needed for Pain.  - Regular home exercise program was encouraged.  - Follow up in about 4 months (around 2/18/2025).        This note was partly generated with [employer] voice recognition software. I apologize for any possible typographical errors.

## 2024-11-05 ENCOUNTER — PATIENT MESSAGE (OUTPATIENT)
Dept: PHYSICAL MEDICINE AND REHAB | Facility: CLINIC | Age: 34
End: 2024-11-05
Payer: OTHER GOVERNMENT

## 2024-11-21 ENCOUNTER — OFFICE VISIT (OUTPATIENT)
Dept: NEUROLOGY | Facility: CLINIC | Age: 34
End: 2024-11-21
Payer: OTHER GOVERNMENT

## 2024-11-21 VITALS
SYSTOLIC BLOOD PRESSURE: 145 MMHG | HEIGHT: 69 IN | WEIGHT: 205 LBS | DIASTOLIC BLOOD PRESSURE: 87 MMHG | HEART RATE: 77 BPM | BODY MASS INDEX: 30.36 KG/M2

## 2024-11-21 DIAGNOSIS — R56.9 SEIZURE-LIKE ACTIVITY: Primary | ICD-10-CM

## 2024-11-21 PROCEDURE — 99214 OFFICE O/P EST MOD 30 MIN: CPT | Mod: PBBFAC,PN | Performed by: STUDENT IN AN ORGANIZED HEALTH CARE EDUCATION/TRAINING PROGRAM

## 2024-11-21 PROCEDURE — 99999 PR PBB SHADOW E&M-EST. PATIENT-LVL IV: CPT | Mod: PBBFAC,,, | Performed by: STUDENT IN AN ORGANIZED HEALTH CARE EDUCATION/TRAINING PROGRAM

## 2024-11-21 PROCEDURE — 99205 OFFICE O/P NEW HI 60 MIN: CPT | Mod: S$PBB,,, | Performed by: STUDENT IN AN ORGANIZED HEALTH CARE EDUCATION/TRAINING PROGRAM

## 2024-11-21 NOTE — PROGRESS NOTES
GENERAL NEUROLOGY VISIT   Date: 11/21/24  Patient Name: Pradeep Ro   MRN: [id number]   PCP: Mauro Gilmore  Referring Provider: Mauro Gilmore MD    History:    Patient is a 34 y.o.  male who was referred for seizure evaluation.   Past medical history of hyperlipidemia, hypertension, low back pain, JUAN RAMON.     On 10/05/2024, patient admitted to the hospital  Per neurologists Dr. Nesbitt notes      Patient states that he got up from his bed and went to the bathroom.  He only remembers sitting on the toilet and the next thing he remembers is being on the floor.  Wife states that she did not see the beginning of the event but when she got to the bathroom, patient had lost consciousness while she was trying to enter the bathroom.  Noted blood on the floor coming from his mouth as well as tongue bite and noted as well.  Wife says that patient likely bit his tongue but also hit his face to the bathtub bursting his lips open.  While on the floor, she noticed that when he started regaining consciousness, he had a glazed staring episode for a few seconds and then he was slowly coming back to consciousness but seemed confused and was repeating questions.  She called EMS and patient was almost returning back to baseline.  While in the ambulance, patient had a another episode of staring episode but he quickly broke out of it followed by confusion without any administration of benzos.     In the ER, /91 otherwise vitals normal.  Patient was noted to be slightly confused and mild oral trauma.  Initial blood work concerning for WBC 13.67, troponin 0.053, potassium 3, calcium 7.4, .  CT head was obtained which was unremarkable.  Interestingly patient had presented to the ER 1 day prior on 10/04 for nonspecific left-sided flank pain at which time his calcium was 9.1.  Cardiology followed for the leak troponin , no cardiac concern.    MRI with seizure protocol done with no findings, EEG done with no seizure or epileptiform  discharges    Place on Keppra 750 mg b.i.d. and discharged home.     Today visit   Another event reported on 11/9/2024 deemed visit to the emergency room, wife reported he woke up in the morning confused, unable to answer questions appropriately, answer with ha?,what ? And I am ok.  Reported his eyes were shifted to 1 side when she look at his face, then he slept for around 1-1/2 hour and woke up was still confused, went out with his wife to  the truck from the  then they drove to the hospital.  Wife reported while they are in the car, he turned towards the window, make a shout sound, and she notice his left was flexed and stiff, when he turned towards her there was bleeding from his mouth, apparently he bit his tongue, no incontinence reported, after that he was confused.  Patient reported he remembers vividly talking turn in the morning, remember they went out but he does not know why they did, remember he woke up after few hours in the emergency room.  CBC with a normal, glucose 126, troponin  normal, phosphorus 2, acetaminophen and salicylates liver with a normal, C-reactive protein normal, Keppra level 8.8.  Keppra increased to 1 g b.i.d. he discharged home.  No events since then.  Reported some mood changes when he feels can triggered easily, no aggression reported, likely related the Keppra    Reported before both episodes he was extremely tired then next day the event occurred.   Reported tongue bite on both times, no incontinent reported.      no recent head injury, no new medication, denies alcohol or illicit drug, no recent infection, no family history of seizure.    Past Medical History:   Diagnosis Date    Hyperlipidemia, unspecified 02/21/2024    JUAN RAMON (obstructive sleep apnea) 02/24/2024    Primary hypertension 10/6/2024       Past Surgical History:   Procedure Laterality Date    ANGIOGRAM, CORONARY, WITH LEFT HEART CATHETERIZATION Left 10/7/2024    Procedure: Angiogram, Coronary, with  Left Heart Cath;  Surgeon: Konstantin Peres MD;  Location: [facility] CATH LAB;  Service: Cardiology;  Laterality: Left;  not before 9am, R rad access       Social History     Socioeconomic History    Marital status:    Tobacco Use    Smoking status: Never    Smokeless tobacco: Never   Substance and Sexual Activity    Alcohol use: Yes     Alcohol/week: 6.0 standard drinks of alcohol     Types: 3 Shots of liquor, 3 Drinks containing 0.5 oz of alcohol per week    Drug use: No    Sexual activity: Yes     Partners: Female     Birth control/protection: OCP     Social Drivers of Health     Financial Resource Strain: Low Risk  (10/6/2024)    Overall Financial Resource Strain (CARDIA)     Difficulty of Paying Living Expenses: Not hard at all   Food Insecurity: No Food Insecurity (10/6/2024)    Hunger Vital Sign     Worried About Running Out of Food in the Last Year: Never true     Ran Out of Food in the Last Year: Never true   Transportation Needs: No Transportation Needs (10/6/2024)    TRANSPORTATION NEEDS     Transportation : No   Physical Activity: Insufficiently Active (2/21/2024)    Exercise Vital Sign     Days of Exercise per Week: 1 day     Minutes of Exercise per Session: 10 min   Stress: No Stress Concern Present (10/6/2024)    [employer] [place] [employer] - Occupational Stress Questionnaire     Feeling of Stress : Not at all   Housing Stability: Low Risk  (10/6/2024)    Housing Stability Vital Sign     Unable to Pay for Housing in the Last Year: No     Homeless in the Last Year: No       Review of patient's allergies indicates:  No Known Allergies    Current Outpatient Medications on File Prior to Visit   Medication Sig Dispense Refill    acetaminophen (TYLENOL) 500 MG tablet Take 1-2 tablets (500-1,000 mg total) by mouth 3 (three) times daily as needed for Pain.  0    amLODIPine (NORVASC) 5 MG tablet Take 1 tablet (5 mg total) by mouth once daily. 30 tablet 2    ibuprofen (ADVIL,MOTRIN) 600 MG  "tablet Take 1 tablet (600 mg total) by mouth 3 (three) times daily as needed for Pain. 90 tablet 2    levETIRAcetam (KEPPRA) 750 MG Tab 1 tablet (750 mg total) every 12 (twelve) hours. (Patient taking differently: 1,000 mg every 12 (twelve) hours.) 60 tablet 2    orphenadrine (NORFLEX) 100 mg tablet Take 1 tablet (100 mg total) by mouth every 12 (twelve) hours as needed. 12 tablet 0    atorvastatin (LIPITOR) 20 MG tablet Take with food/milk.Take or use exactly as directed.Obtain advice for OTCs.Do not take if pregnant.Avoid grapefruit and grapefruit juice.      gabapentin (NEURONTIN) 300 MG capsule Take 1 capsule (300 mg total) by mouth 2 (two) times daily. In 1 wk, if no relief, increase to twice daily.In another wk, may increase to 3 times. 60 capsule 0    meloxicam (MOBIC) 15 MG tablet Take 1 tablet (15 mg total) by mouth once daily. 30 tablet 2     No current facility-administered medications on file prior to visit.        Family history:  Family History   Problem Relation Name Age of Onset    Heart disease Father      Hyperlipidemia Father      Heart disease Paternal Grandfather      Hyperlipidemia Paternal Grandfather         Review Of Systems     Constitutional Negative for fevers, chills, weigh loss   HEENT Negative for hearing loss, dysphagia, sore throat, diplopia   Respiratory Negative for shortness of breath, cough    Cardiovascular Negative for chest pain, palpitations    Gastrointestinal Negative for constipation, diarrhea, early satiety    Skin Negative for rashes    Musculoskeletal Negative for joint pains, myalgias.   Neurological See Above    Psychological Negative for sleep disturbances.    Heme/Lymph Negative for easy bruising, easy bleeding    Endocrine Negative for polyuria, polydypsia     Physical Exam:     Physical Examination    Vitals: BP (!) 145/87 (BP Location: Left arm, Patient Position: Sitting)   Pulse 77   Ht 5' 9" (1.753 m)   Wt 93 kg (205 lb)   BMI 30.27 kg/m² "       NEURO    Neurological Exam  Mental Status:  Alert and oriented to person, place and time, recent and remote memory intact, normal attention span and fund of knowledge; Speech is spontaneous and fluent     Cranial Nerve exam:  II: Visual fields full to confrontation; Pupils equal round and reactive about 3mm  III, IV, VI: Extraoccular movements intact with no nystagmus  V: Sensation in V1, V2, V3 intact to light-touch bilaterally,  VII:  No facial weakness,  VIII: Hearing grossly intact  IX,X: palate elevation symmetric   XI: SCM & Trapezius normal,  XII: tongue midline, normal morphology, tongue movement normal     Motor Exam: No involuntary movement. Normal tone and bulk in all 4 extremities  Strength: 5/5 throughout   Reflexes: 2+ throughout    Sensory Exam: Intact touch, pain and vibration in all 4 limbs    Cerebellar Sign: Normal Finger-to-nose,   bilaterally.  Gait:  Normal steady physiologic gait with normal arm swinging on both side     Interval/Previous Work-up:   Reviewed      Assessment and Plan:   Pradeep Ro is a 34 y.o. male presenting seizure-like activity evaluation.  Patient had 2 episode with 1 month apart, first one found down with confusion in the bathroom, patient vividly remembering, admitted to the hospital, MRI with seizure protocol and EEG with no findings, started on Keppra 750 mg b.i.d., 1 month later he had another episode when he wakes up confused for few hours then he had led side tonic-clonic description with upper extremity flexion, tongue bite postictal description symptoms.  Patient remember those episodes vividly.  Went to the emergency room and Keppra increased to 1 g b.i.d. no EEG done.  Symptoms suggest and provoked seizure giving the description.     Problem List Items Addressed This Visit    None  Visit Diagnoses       Seizure-like activity    -  Primary    Relevant Orders    EEG,w/awake & asleep record              -- Etiology unclear, discuss with the patient and  wife option of repeating EEG to see if he can catch any abnormality.        -- discussed switching Keppra giving the patient feel making feel angry, we discussed hold on until get the EEG done, can switch either to Vimpat or lamotrigine.        -- discuss the option of hospital admission for prolonged EEG for characterization.         -- if patient had another episode, advised visit to the emergency room, would recommend a stat EEG at the time of the event.    Discussed with patient and wife  regarding driving laws in [place] after seizures. Patient must refrain from driving for 6 months after having a seizure, and must be cleared by a neurologist to legally resume driving. Additionally, advised patient to avoid bathing or swimming alone, climbing ladders or standing near precipice where one could fall, operating heavy machinery and to not supervise children or engage in other activities where losing consciousness or motor control would risk harm to self or others.        During a seizure:  - Ensure the person is in a safe place, remove hard or sharp objects from the area  - Turn the person on their side  - Never force anything into their mouth  - Keep on eye on the time, if the jerking/shaking/tensing of the muscles lasts > 5 minutes, call 911.     After a seizure:  - Allow them to lie quietly, gently call them to see if they wake up  - If there is injury or if they are not waking up within 5 minutes, call 911     Safety:  - Take showers, not baths  - Take care when around bodies of water (swimming pools, lakes, etc) and wear protective equipment. Do not swim alone  - Use equipment with automatic shutoff safety features  - Do not climb ladders or use heavy machinery until seizure-free for 6 months  - Use the back burners when cooking  - If you have children, change diapers on the floor and avoid holding them while taking stairs  - Do not drive until seizure-free for 6 months        Triggers:  - Be sure to take you  medication as scheduled without missed doses  - Be sure to get 8 hours of sleep each night  - Certain medications to avoid:          - Benadryl (diphenhydramine)          - Tramadol (Ultram)          - Certain antibiotics in the fluoroquinolone class (levaquin or cipro)          - Wellbutrin           - Chantix          - Alcohol          - Other illegal drugs or stimulant medications  - Herbal supplements to avoid that can lower the seizure threshold:              - Asafoetida, Borage, Ephedra, Ergot, Eucalyptus, Evening primrose, Ginkgo biloba, Ginseng, Pennyroyal, Cj, Shankpushpi, Star anise, Star fruit, [place], and Yohimbine         RTC 4-5 months    Time spent on this encounter:  50 minutes. This includes face to face time and non-face to face time preparing to see the patient (eg, review of tests), obtaining and/or reviewing separately obtained history, documenting clinical information in the electronic or other health record, independently interpreting results and communicating results to the patient/family/caregiver, or care coordinator.       A dictation device was used to produce this document. Use of such devices sometimes results in grammatical errors or replacement of words that sound similarly.     Shola Pickens MD, M.B.Ch.B  Neurology, Vascular neurology  [facility]

## 2024-12-04 ENCOUNTER — HOSPITAL ENCOUNTER (OUTPATIENT)
Dept: NEUROLOGY | Facility: HOSPITAL | Age: 34
Discharge: HOME | End: 2024-12-04
Attending: STUDENT IN AN ORGANIZED HEALTH CARE EDUCATION/TRAINING PROGRAM
Payer: OTHER GOVERNMENT

## 2024-12-04 DIAGNOSIS — R56.9 SEIZURE-LIKE ACTIVITY: ICD-10-CM

## 2024-12-04 PROCEDURE — 95819 EEG AWAKE AND ASLEEP: CPT | Mod: 26,,, | Performed by: INTERNAL MEDICINE

## 2024-12-16 ENCOUNTER — PATIENT MESSAGE (OUTPATIENT)
Dept: NEUROLOGY | Facility: CLINIC | Age: 34
End: 2024-12-16
Payer: OTHER GOVERNMENT

## 2024-12-16 ENCOUNTER — TELEPHONE (OUTPATIENT)
Dept: NEUROLOGY | Facility: CLINIC | Age: 34
End: 2024-12-16
Payer: OTHER GOVERNMENT

## 2024-12-23 ENCOUNTER — PATIENT MESSAGE (OUTPATIENT)
Dept: INTERNAL MEDICINE | Facility: CLINIC | Age: 34
End: 2024-12-23
Payer: OTHER GOVERNMENT

## 2024-12-24 NOTE — PROCEDURES
ELECTROENCEPHALOGRAM REPORT    DATE OF SERVICE:  12/04/2024  EEG NUMBER: OW   REQUESTED BY: Shola Pickens MD   LOCATION OF SERVICE: [facility]    METHODOLOGY   Electroencephalographic (EEG) recording is with electrodes placed according to the International 10-20 placement system.  Thirty two (32) channels of digital signal (sampling rate of 512/sec) including T1 and T2 was simultaneously recorded from the scalp and may include  EKG, EMG, and/or eye monitors.  Recording band pass was 0.1 to 512 hz.  Digital video recording of the patient is simultaneously recorded with the EEG.  The patient is instructed report clinical symptoms which may occur during the recording session.  EEG and video recording is stored and archived in digital format. Activation procedures which include photic stimulation, hyperventilation and instructing patients to perform simple task are done in selected patients.    The EEG is displayed on a monitor screen and can be reviewed using different montages.  Computer assisted analysis is employed to detect spike and electrographic seizure activity.   The entire record is submitted for computer analysis.  The entire recording is visually reviewed and the times identified by computer analysis as being spikes or seizures are reviewed again.  Compresses spectral analysis (CSA) is also performed on the activity recorded from each individual channel.  This is displayed as a power display of frequencies from 0 to 30 Hz over time.   The CSA is reviewed looking for asymmetries in power between homologous areas of the scalp and then compared with the original EEG recording.     TextÃ¡do software was also utilized in the review of this study.  This software suite analyzes the EEG recording in multiple domains.  Coherence and rhythmicity is computed to identify EEG sections which may contain organized seizures.  Each channel undergoes analysis to detect presence of spike and sharp waves which have special and  morphological characteristic of epileptic activity.  The routine EEG recording is converted from spacial into frequency domain.  This is then displayed comparing homologous areas to identify areas of significant asymmetry.  Algorithm to identify non-cortically generated artifact is used to separate eye movement, EMG and other artifact from the EEG    EEG FINDINGS  During the maximally alert state, a 11-12 Hz posterior dominant rhythm was seen which was symmetrical, well-regulated and briskly attenuated to eye opening. In the more anterior head regions, symmetric frontocentral beta frequencies predominated. As drowsiness occurred, the posterior dominant rhythm attenuated, slow rolling eye movements appeared, and symmetrical vertex sharp transients were seen. Stage N2 sleep was reached and was characterized proc by high amplitude K-complexes and 12-15 Hz symmetrical and synchronous frontocentral sleep spindles.    No epileptiform findings were noted, no electrographic seizures were seen, and no clinical events were reported.      Activation Procedures   Hyperventilation - no change in cortical rhythms   Photic Stimulation     - occipital driving - absent    - Pathological discharges produced - NO      IMPRESSION:  Normal awake and asleep    CLINICAL CORRELATION:  This is a normal EEG in awake and asleep states.  There were no epileptiform findings, electrographic seizures, or no clinical events recorded. An EEG without epileptiform findings does not exclude the possibility of epilepsy. If the clinical suspicion of epilepsy is high, a repeat EEG after sleep depreivation, following a seizure, or a prolonged EEG may increase the frequency of detecting epileptiform discharges.      Delicia Parks MD  Neurology  [facility]

## 2024-12-30 PROBLEM — R56.9 SEIZURE-LIKE ACTIVITY: Status: ACTIVE | Noted: 2024-12-30

## 2025-01-06 ENCOUNTER — TELEPHONE (OUTPATIENT)
Dept: NEUROLOGY | Facility: CLINIC | Age: 35
End: 2025-01-06

## 2025-01-06 NOTE — TELEPHONE ENCOUNTER
I spoke with Ms. Ro and faxed over patients EEG results due to authorizing provider stating to patient that they can not see results per spouse.  Patient to take results to pcp for results.   Verbalized understanding      ----- Message from Margie sent at 1/6/2025  8:48 AM CST -----  .Type:  Test Results    Who Called: Melba - wife     Name of Test (Lab/Mammo/Etc): EEG    Date of Test: 12/4/24    Ordering Provider:  Dr. Pickens     Where the test was performed: [facility], Neurology    Would the patient rather a call back or a response via [facility]? Call Back     Best Call Back Number: [phone]    Additional Information:   Stated Dr. Pickens and his staff informed them that he can't release any results. Ask that someone give her a call     For Clinical Team:Has the provider reviewed the results? Not sure

## 2025-01-07 ENCOUNTER — TELEPHONE (OUTPATIENT)
Dept: INTERNAL MEDICINE | Facility: CLINIC | Age: 35
End: 2025-01-07

## 2025-02-06 ENCOUNTER — OFFICE VISIT (OUTPATIENT)
Dept: INTERNAL MEDICINE | Facility: CLINIC | Age: 35
End: 2025-02-06
Payer: OTHER GOVERNMENT

## 2025-02-06 VITALS
HEIGHT: 69 IN | WEIGHT: 211.19 LBS | DIASTOLIC BLOOD PRESSURE: 88 MMHG | HEART RATE: 72 BPM | BODY MASS INDEX: 31.28 KG/M2 | OXYGEN SATURATION: 98 % | SYSTOLIC BLOOD PRESSURE: 118 MMHG

## 2025-02-06 DIAGNOSIS — G40.009 PARTIAL IDIOPATHIC EPILEPSY WITH SEIZURES OF LOCALIZED ONSET, NOT INTRACTABLE, WITHOUT STATUS EPILEPTICUS: Primary | ICD-10-CM

## 2025-02-06 DIAGNOSIS — I10 PRIMARY HYPERTENSION: ICD-10-CM

## 2025-02-06 PROBLEM — Z77.29 CONTACT WITH AND (SUSPECTED) EXPOSURE TO OTHER HAZARDOUS SUBSTANCES: Status: ACTIVE | Noted: 2025-02-06

## 2025-02-06 PROBLEM — Z77.29 EXPOSURE TO POTENTIALLY HAZARDOUS SUBSTANCE: Status: ACTIVE | Noted: 2025-02-06

## 2025-02-06 PROCEDURE — 99999 PR PBB SHADOW E&M-EST. PATIENT-LVL IV: CPT | Mod: PBBFAC,,, | Performed by: FAMILY MEDICINE

## 2025-02-06 PROCEDURE — 99214 OFFICE O/P EST MOD 30 MIN: CPT | Mod: PBBFAC | Performed by: FAMILY MEDICINE

## 2025-02-06 RX ORDER — LEVETIRACETAM 1000 MG/1
1000 TABLET ORAL 2 TIMES DAILY
COMMUNITY
Start: 2024-11-09 | End: 2025-02-06 | Stop reason: SDUPTHER

## 2025-02-06 RX ORDER — AMLODIPINE BESYLATE 5 MG/1
5 TABLET ORAL DAILY
Qty: 90 TABLET | Refills: 3 | Status: SHIPPED | OUTPATIENT
Start: 2025-02-06 | End: 2026-02-01

## 2025-02-06 RX ORDER — DIAZEPAM 5 MG/1
5 TABLET ORAL ONCE AS NEEDED
Qty: 10 TABLET | Refills: 0 | Status: SHIPPED | OUTPATIENT
Start: 2025-02-06

## 2025-02-06 RX ORDER — LEVETIRACETAM 1000 MG/1
1000 TABLET ORAL 2 TIMES DAILY
Qty: 180 TABLET | Refills: 1 | Status: SHIPPED | OUTPATIENT
Start: 2025-02-06 | End: 2025-08-05

## 2025-02-06 NOTE — PROGRESS NOTES
"Subjective:     Patient ID: Pradeep Ro is a 34 y.o. male.   Chief Complaint: Follow-up    HPI:  History of Present Illness    CHIEF COMPLAINT:  Patient presents today for follow up after having another seizure    He experienced a seizure during the week of snow (1/23/25) after missing Keppra for two days. Prodromal symptoms included persistent smiling and only responding "I'm fine" to questions. The seizure manifested with mild shaking and facial pulling rather than aggressive convulsions. Post-ictal confusion lasted approximately 2 hours. He reports ongoing muscle soreness since the event, with initial difficulty in putting on work vest independently and continues to experience limitations with overhead reaching and extension.    He reports poor sleep patterns since seizure onset. His schedule is irregular with daytime fatigue around 1 PM despite energy drink consumption, followed by 2-hour post-work naps. He is unable to fall asleep until after midnight. A sleep study last year revealed the need for CPAP, but he reports difficulty using it due to poor sleep quality.    He takes Keppra for seizures and amlodipine. He has valium nasal spray available for prolonged seizures.    He reports a persistent blood-like odor from his mouth, particularly noticeable during sleep and often persisting after oral hygiene. He was recently treated at [facility] on December 27th for nasal cellulitis, presenting with redness, warmth, and purulence. He completed treatment with Keflex and topical medication. While feeling improved, he notes his nose still appears unusual.         Review of Systems   Constitutional:  Positive for activity change. Negative for chills and fever.   HENT:  Negative for nasal congestion, ear pain and sore throat.    Eyes:  Negative for visual disturbance.   Respiratory:  Negative for cough and shortness of breath.    Cardiovascular:  Negative for chest pain.   Gastrointestinal:  Negative for " "constipation, diarrhea, nausea and vomiting.   Genitourinary:  Negative for dysuria and frequency.   Musculoskeletal:  Positive for myalgias.   Integumentary:  Negative for rash.   Neurological:  Negative for seizures, speech difficulty, weakness, headaches and coordination difficulties.   Psychiatric/Behavioral:  Negative for suicidal ideas.    All other systems reviewed and are negative.         Objective:      Vitals:    02/06/25 1403   BP: 118/88   BP Location: Left arm   Patient Position: Sitting   Pulse: 72   SpO2: 98%   Weight: 95.8 kg (211 lb 3.2 oz)   Height: 5' 9" (1.753 m)      Physical Exam  Vitals reviewed.   Constitutional:       General: He is not in acute distress.     Appearance: Normal appearance. He is not ill-appearing.   HENT:      Head: Normocephalic and atraumatic.      Right Ear: External ear normal.      Left Ear: External ear normal.      Nose: Nose normal.   Cardiovascular:      Rate and Rhythm: Normal rate and regular rhythm.      Pulses: Normal pulses.      Heart sounds: Normal heart sounds. No murmur heard.     No friction rub. No gallop.   Pulmonary:      Effort: Pulmonary effort is normal. No respiratory distress.      Breath sounds: Normal breath sounds. No stridor. No wheezing, rhonchi or rales.   Abdominal:      General: Abdomen is flat. Bowel sounds are normal.      Palpations: Abdomen is soft.   Musculoskeletal:      Cervical back: Normal range of motion.   Neurological:      General: No focal deficit present.      Mental Status: He is alert and oriented to person, place, and time. Mental status is at baseline.   Psychiatric:         Mood and Affect: Mood normal.         Behavior: Behavior normal.         Thought Content: Thought content normal.         Judgment: Judgment normal.           Assessment/Plan:             ICD-10-CM ICD-9-CM   1. Partial idiopathic epilepsy with seizures of localized onset, not intractable, without status epilepticus  G40.009 345.50   2. Primary " hypertension  I10 401.9     Orders Placed This Encounter   Procedures    MRI Brain Epilepsy W W/O Contrast    Ambulatory referral/consult to Sleep Disorders       Assessment & Plan    - Assessed recent seizure episode, occurring after missing medication for 2 days  - Evaluated post-ictal symptoms, lasting approximately 2 hours  - Considered potential sleep disorder contributing to seizure threshold  - Will order repeat MRI to check for changes since initial scan 4-5 months ago  - Planned to avoid prescribing stronger sleep aids due to potential interactions with Keppra and seizure threshold concerns  - Provided Valium for breaking potential seizure cycles, limiting to 1 dose in 24-36 hours before seeking higher level of care  - Reviewed recent cellulitis of nose treated at urgent care, monitoring for recurrence    1. Partial idiopathic epilepsy with seizures of localized onset, not intractable, without status epilepticus (Primary)  Refill Keppra to ensure no lapse in rx  Ordering new MRI Brain WWO  Patient has upcoming follow-up with epileptologist  Ref to Sleep medicine for repeat sleep study, intend to check for sleep disorders beyond JUAN RAMON  Order PO version of valium to use PRN, can be used in lieu of nasal spray if patient is able to take and swallow pills normally - IN valium to still be used for status eplepticus  - levETIRAcetam (KEPPRA) 1000 MG tablet; Take 1 tablet (1,000 mg total) by mouth 2 (two) times daily.  Dispense: 180 tablet; Refill: 1  - MRI Brain Epilepsy W W/O Contrast; Future  - Ambulatory referral/consult to Sleep Disorders; Future  - diazePAM (VALIUM) 5 MG tablet; Take 1 tablet (5 mg total) by mouth 1 (one) time if needed (Clusters of seizure activirty). Take 1 tablet by mouth if having multiple seizure-like episodes in succession. Contact  For questions about appropriate use.  Dispense: 10 tablet; Refill: 0    2. Primary hypertension  Refill amlodipine  Stable and controlled  - amLODIPine  (NORVASC) 5 MG tablet; Take 1 tablet (5 mg total) by mouth once daily.  Dispense: 90 tablet; Refill: 3          Follow up in about 4 weeks (around 3/6/2025).     Mauro Gilmore MD, [facility]  Family Medicine Physician  [facility]  02/11/2025      This note was generated with the assistance of ambient listening technology. Verbal consent was obtained by the patient and accompanying visitor(s) for the recording of patient appointment to facilitate this note. I attest to having reviewed and edited the generated note for accuracy, though some syntax or spelling errors may persist. Please contact the author of this note for any clarification.

## 2025-02-18 ENCOUNTER — HOSPITAL ENCOUNTER (OUTPATIENT)
Dept: RADIOLOGY | Facility: HOSPITAL | Age: 35
Discharge: HOME | End: 2025-02-18
Attending: FAMILY MEDICINE
Payer: OTHER GOVERNMENT

## 2025-02-18 DIAGNOSIS — G40.009 PARTIAL IDIOPATHIC EPILEPSY WITH SEIZURES OF LOCALIZED ONSET, NOT INTRACTABLE, WITHOUT STATUS EPILEPTICUS: ICD-10-CM

## 2025-02-18 PROCEDURE — 25500020 PHARM REV CODE 255: Performed by: FAMILY MEDICINE

## 2025-02-18 PROCEDURE — 70553 MRI BRAIN STEM W/O & W/DYE: CPT | Mod: TC

## 2025-02-18 PROCEDURE — A9585 GADOBUTROL INJECTION: HCPCS | Performed by: FAMILY MEDICINE

## 2025-02-18 RX ORDER — GADOBUTROL 604.72 MG/ML
10 INJECTION INTRAVENOUS
Status: COMPLETED | OUTPATIENT
Start: 2025-02-18 | End: 2025-02-18

## 2025-02-18 RX ADMIN — GADOBUTROL 10 ML: 604.72 INJECTION INTRAVENOUS at 08:02

## 2025-03-06 ENCOUNTER — HOSPITAL ENCOUNTER (EMERGENCY)
Facility: HOSPITAL | Age: 35
Discharge: HOME | End: 2025-03-07
Attending: EMERGENCY MEDICINE
Payer: OTHER GOVERNMENT

## 2025-03-06 ENCOUNTER — NURSE TRIAGE (OUTPATIENT)
Dept: ADMINISTRATIVE | Facility: CLINIC | Age: 35
End: 2025-03-06
Payer: OTHER GOVERNMENT

## 2025-03-06 DIAGNOSIS — R51.9 ACUTE NONINTRACTABLE HEADACHE, UNSPECIFIED HEADACHE TYPE: Primary | ICD-10-CM

## 2025-03-06 PROCEDURE — 99285 EMERGENCY DEPT VISIT HI MDM: CPT | Mod: 25

## 2025-03-06 PROCEDURE — 87635 SARS-COV-2 COVID-19 AMP PRB: CPT | Performed by: PHYSICIAN ASSISTANT

## 2025-03-06 RX ORDER — DEXAMETHASONE SODIUM PHOSPHATE 4 MG/ML
12 INJECTION, SOLUTION INTRA-ARTICULAR; INTRALESIONAL; INTRAMUSCULAR; INTRAVENOUS; SOFT TISSUE
Status: COMPLETED | OUTPATIENT
Start: 2025-03-06 | End: 2025-03-07

## 2025-03-06 RX ORDER — KETOROLAC TROMETHAMINE 30 MG/ML
15 INJECTION, SOLUTION INTRAMUSCULAR; INTRAVENOUS
Status: COMPLETED | OUTPATIENT
Start: 2025-03-06 | End: 2025-03-07

## 2025-03-06 RX ORDER — ACETAMINOPHEN 500 MG
1000 TABLET ORAL
Status: DISCONTINUED | OUTPATIENT
Start: 2025-03-06 | End: 2025-03-06

## 2025-03-06 NOTE — Clinical Note
"Pradeep Kessler" Jayjay was seen and treated in our emergency department on 3/6/2025.  He may return to work on 03/08/2025.       If you have any questions or concerns, please don't hesitate to call.       CAMILO    "

## 2025-03-06 NOTE — Clinical Note
"Pradeep Kessler" Jayjay was seen and treated in our emergency department on 3/6/2025.  He may return to work on 03/08/2025.       If you have any questions or concerns, please don't hesitate to call.       RN    "

## 2025-03-07 ENCOUNTER — TELEPHONE (OUTPATIENT)
Dept: INTERNAL MEDICINE | Facility: CLINIC | Age: 35
End: 2025-03-07
Payer: OTHER GOVERNMENT

## 2025-03-07 VITALS
TEMPERATURE: 98 F | DIASTOLIC BLOOD PRESSURE: 91 MMHG | WEIGHT: 210 LBS | RESPIRATION RATE: 20 BRPM | BODY MASS INDEX: 31.1 KG/M2 | HEART RATE: 66 BPM | HEIGHT: 69 IN | SYSTOLIC BLOOD PRESSURE: 153 MMHG | OXYGEN SATURATION: 96 %

## 2025-03-07 LAB
CTP QC/QA: YES
CTP QC/QA: YES
POC MOLECULAR INFLUENZA A AGN: NEGATIVE
POC MOLECULAR INFLUENZA B AGN: NEGATIVE
SARS-COV-2 RDRP RESP QL NAA+PROBE: NEGATIVE

## 2025-03-07 PROCEDURE — 96375 TX/PRO/DX INJ NEW DRUG ADDON: CPT

## 2025-03-07 PROCEDURE — 87502 INFLUENZA DNA AMP PROBE: CPT

## 2025-03-07 PROCEDURE — 63600175 PHARM REV CODE 636 W HCPCS: Mod: JZ,TB | Performed by: PHYSICIAN ASSISTANT

## 2025-03-07 PROCEDURE — 96374 THER/PROPH/DIAG INJ IV PUSH: CPT

## 2025-03-07 PROCEDURE — 96361 HYDRATE IV INFUSION ADD-ON: CPT

## 2025-03-07 RX ORDER — CETIRIZINE HYDROCHLORIDE 10 MG/1
10 TABLET ORAL DAILY
Qty: 14 TABLET | Refills: 0 | Status: SHIPPED | OUTPATIENT
Start: 2025-03-07 | End: 2025-03-21

## 2025-03-07 RX ORDER — DIPHENHYDRAMINE HYDROCHLORIDE 50 MG/ML
25 INJECTION, SOLUTION INTRAMUSCULAR; INTRAVENOUS
Status: COMPLETED | OUTPATIENT
Start: 2025-03-07 | End: 2025-03-07

## 2025-03-07 RX ORDER — PROCHLORPERAZINE EDISYLATE 5 MG/ML
10 INJECTION INTRAMUSCULAR; INTRAVENOUS ONCE
Status: COMPLETED | OUTPATIENT
Start: 2025-03-07 | End: 2025-03-07

## 2025-03-07 RX ORDER — AZELASTINE 1 MG/ML
1 SPRAY, METERED NASAL 2 TIMES DAILY
Qty: 30 ML | Refills: 0 | Status: SHIPPED | OUTPATIENT
Start: 2025-03-07 | End: 2026-03-07

## 2025-03-07 RX ADMIN — KETOROLAC TROMETHAMINE 15 MG: 30 INJECTION, SOLUTION INTRAMUSCULAR; INTRAVENOUS at 12:03

## 2025-03-07 RX ADMIN — DEXAMETHASONE SODIUM PHOSPHATE 12 MG: 4 INJECTION INTRA-ARTICULAR; INTRALESIONAL; INTRAMUSCULAR; INTRAVENOUS; SOFT TISSUE at 12:03

## 2025-03-07 RX ADMIN — SODIUM CHLORIDE, POTASSIUM CHLORIDE, SODIUM LACTATE AND CALCIUM CHLORIDE 1000 ML: 600; 310; 30; 20 INJECTION, SOLUTION INTRAVENOUS at 12:03

## 2025-03-07 RX ADMIN — PROCHLORPERAZINE EDISYLATE 10 MG: 5 INJECTION INTRAMUSCULAR; INTRAVENOUS at 01:03

## 2025-03-07 RX ADMIN — DIPHENHYDRAMINE HYDROCHLORIDE 25 MG: 50 INJECTION INTRAMUSCULAR; INTRAVENOUS at 01:03

## 2025-03-07 NOTE — ED PROVIDER NOTES
"Encounter Date: 3/6/2025       History     Chief Complaint   Patient presents with    Headache     Pt states that he has had a headache X this morning, pt states he has HTN, pt denies missing his BP meds. Pt states that his head started to hurt and then he checked his BP and it was elevated.      33yo M presents to ED with wife with chief complaint right-sided headache since yesterday evening.    Began with congestion, rhinorrhea, frontal headache, myalgias on Monday, mild but improved on Tuesday.  Yesterday evening began with mild right-sided headache, woke this morning without headache.  Since around 9:00 a.m. again with right-sided headache, has worsened throughout the day today.  Headache is constant, pressure type discomfort, severe. Decreased appetite and intake throughout the day today.  Denies history of similar headaches.  There is associated photophobia.  Headache is worsened with movement, i.e. rolling over in bed.  Took Tylenol sinus this morning with some improvement of symptoms.  Took Tylenol this evening without improvement.  No fever or chills.  No cough.  Does admit to occasional sinus issues.  No otalgia.  Near-complete resolution of previous congestion and rhinorrhea.  No odynophagia.  No chest pain.  No shortness of breath.  No abdominal pain.  No history of CVA.  No head trauma.  Arm or leg weakness, slurred speech, facial droop, unsteady gait, aphasia.    Wife states typically he is "out of it", not conversational in the days prior to his seizures---she states today's presentation not like previous pre seizure symptoms.  Compliant with antiepileptics.  He is transitioning off of Keppra, he is increasing his dose of lacosamide.      PMH:  JUAN RAMON  HLD  HTN  Vit D def  Epilepsy  Chronic low back pain      EEG 3/5/25:  Interpretation:  Normal waking and sleep EEG.      Review of patient's allergies indicates:  No Known Allergies  Past Medical History:   Diagnosis Date    Hyperlipidemia, unspecified " 02/21/2024    JUAN RAMON (obstructive sleep apnea) 02/24/2024    Primary hypertension 10/6/2024    Seizure-like activity 12/30/2024     Past Surgical History:   Procedure Laterality Date    ANGIOGRAM, CORONARY, WITH LEFT HEART CATHETERIZATION Left 10/7/2024    Procedure: Angiogram, Coronary, with Left Heart Cath;  Surgeon: oKnstantin Peres MD;  Location[facility] CATH LAB;  Service: Cardiology;  Laterality: Left;  not before 9am, R rad access     Family History   Problem Relation Name Age of Onset    Heart disease Father      Hyperlipidemia Father      Heart disease Paternal Grandfather      Hyperlipidemia Paternal Grandfather       Social History[1]  Review of Systems   Constitutional:  Negative for chills and fever.   HENT:  Positive for congestion and rhinorrhea.    Eyes:  Positive for photophobia. Negative for visual disturbance.   Respiratory:  Negative for cough.    Gastrointestinal:  Negative for abdominal pain, nausea and vomiting.   Musculoskeletal:  Positive for myalgias.   Neurological:  Positive for headaches. Negative for dizziness, seizures, syncope, facial asymmetry, speech difficulty, weakness and numbness.       Physical Exam     Initial Vitals [03/06/25 2127]   BP Pulse Resp Temp SpO2   (!) 164/101 76 18 98.2 °F (36.8 °C) 98 %      MAP       --         Physical Exam    Nursing note and vitals reviewed.  Constitutional: He appears well-developed and well-nourished. He is not diaphoretic. No distress.   Uncomfortable appearing, nontoxic   HENT:   Head: Normocephalic and atraumatic.   Nasal congestion, erythematous nasal mucosa, turbinate edema bilaterally   Eyes: Pupils are equal, round, and reactive to light.   Neck: Neck supple.   Normal range of motion.  Cardiovascular:  Normal rate and regular rhythm.           Pulmonary/Chest: No respiratory distress.   Musculoskeletal:         General: No tenderness. Normal range of motion.      Cervical back: Normal range of motion and neck supple.     Neurological:  He is alert and oriented to person, place, and time. He has normal strength.   Grossly symmetric upper and lower extremity strength.  Ambulates with normal, steady gait.   Skin: Skin is warm.   Psychiatric: Thought content normal.   Mildly anxious         ED Course   Procedures  Labs Reviewed   POCT INFLUENZA A/B MOLECULAR       Result Value    POC Molecular Influenza A Ag Negative      POC Molecular Influenza B Ag Negative       Acceptable Yes     SARS-COV-2 RDRP GENE    POC Rapid COVID Negative       Acceptable Yes            Imaging Results              CT Head Without Contrast (Final result)  Result time 03/06/25 23:21:03      Final result by Elton Odonnell MD (03/06/25 23:21:03)                   Impression:      No acute intracranial abnormalities identified.      Electronically signed by: Elton Odonnell MD  Date:    03/06/2025  Time:    23:21               Narrative:    EXAMINATION:  CT HEAD WITHOUT CONTRAST    CLINICAL HISTORY:  Headache, chronic, new features or increased frequency;    TECHNIQUE:  Low dose axial images were obtained through the head.  Coronal and sagittal reformations were also performed. Contrast was not administered.    COMPARISON:  Recent MRI brain from 02/18/2025.    FINDINGS:  No evidence of acute/recent major vascular distribution cerebral infarction, intraparenchymal hemorrhage, or intra-axial space occupying lesion. The ventricular system is normal in size with cavum septum pellucidum noted.  No effacement of the skull-base cisterns. No abnormal extra-axial fluid collections or blood products. Visualized paranasal sinuses and mastoid air cells are clear. The calvarium shows no significant abnormality.                                       Medications   ketorolac injection 15 mg (15 mg Intravenous Given 3/7/25 0013)   lactated ringers bolus 1,000 mL (0 mLs Intravenous Stopped 3/7/25 0154)   dexAMETHasone injection 12 mg (12 mg Intravenous Given  3/7/25 0018)   prochlorperazine injection Soln 10 mg (10 mg Intravenous Given 3/7/25 0106)   diphenhydrAMINE injection 25 mg (25 mg Intravenous Given 3/7/25 0106)     Medical Decision Making  Differential diagnosis:  Sinus type headache, tension headache, viral URI, dehydration, migraine, headache disorder, CVA    Amount and/or Complexity of Data Reviewed  External Data Reviewed: notes.  Labs: ordered. Decision-making details documented in ED Course.  Radiology: ordered and independent interpretation performed. Decision-making details documented in ED Course.    Risk  OTC drugs.  Prescription drug management.               ED Course as of 03/07/25 1920   Thu Mar 06, 2025   2325 CT Head Without Contrast  No acute intracranial abnormality [SM]   Fri Mar 07, 2025   0038 Feels a bit better but still with headache; after discussion, wife states he has tolerated Benadryl in the past without issues.  Will trial Compazine/Benadryl---discussed possibility of lowered seizure threshold, but given my suspicion for migraine or sinus type headache I do think it would benefit from Compazine/Benadryl.  Patient and wife are comfortable with plan.    Symptoms improved following treatments in the ED. he has an appointment with Neurology next week, this is fortuitous.  Overall suspicion of sinus type headache or postviral headache given his preceding URI symptoms.  Discussed possibility of migraine or headache secondary to lacosamide.  Advised to discuss with Neurology.  Advised continued ibuprofen/Tylenol for discomfort, started on daily antihistamine, discussed interim return precautions and red flags.  He and wife are comfortable with plan.  [SM]      ED Course User Index  [SM] Vipul Bob, CAMILO                           Clinical Impression:  Final diagnoses:  [R51.9] Acute nonintractable headache, unspecified headache type (Primary)          ED Disposition Condition    Discharge Stable          ED Prescriptions        Medication Sig Dispense Start Date End Date Auth. Provider    cetirizine (ZYRTEC) 10 MG tablet Take 1 tablet (10 mg total) by mouth once daily. for 14 days 14 tablet 3/7/2025 3/21/2025 Vipul Bob PA-C    azelastine (ASTELIN) 137 mcg (0.1 %) nasal spray 1 spray (137 mcg total) by Nasal route 2 (two) times daily. 30 mL 3/7/2025 3/7/2026 Vipul Bob PA-C          Follow-up Information       Follow up With Specialties Details Why Contact Info    Neurologist   For reevaluation                [1]   Social History  Tobacco Use    Smoking status: Never    Smokeless tobacco: Never   Substance Use Topics    Alcohol use: Not Currently     Alcohol/week: 6.0 standard drinks of alcohol     Types: 3 Shots of liquor, 3 Drinks containing 0.5 oz of alcohol per week    Drug use: No        Vipul Bob PA-C  03/07/25 1921

## 2025-03-07 NOTE — ED TRIAGE NOTES
Received pt from triage, awake, coherent, not in respiratory distress, with complaints of right sided headache and frontal area, aggravated by activity which started last night. Pt have history of seizure. Pt took tylenol prior to ER visit now. Pain score of 8/10 as verbalized by pt.

## 2025-03-07 NOTE — TELEPHONE ENCOUNTER
----- Message from Jackelin sent at 3/7/2025  4:21 PM CST -----  Contact: [phone] .[email or web address] Patient is calling for Medical Advice regarding:pts wife is calling How long has patient had these symptoms:she states they had a call from the office Pharmacy name and phone#:[facility] back [facility]:call back Comments:Checking to see hoe the pts HFU went and if he is needing another appt please advise Please advise patient replies from provider may take up to 48 hours.

## 2025-03-07 NOTE — DISCHARGE INSTRUCTIONS
Tylenol, ibuprofen as needed for headache.  Begin taking Zyrtec once daily.  Astelin for nasal congestion.  Make sure you are drinking plenty of fluids if you are not eating as much.    Discuss recent headache, symptoms, ED visit with neurologist at next week's visit.    Return to this ED if persistent or worsening headache despite current plan, if you begin with vomiting, if you feel lightheaded or dizzy, if any signs of impending seizure, if any other problems occur.

## 2025-03-07 NOTE — TELEPHONE ENCOUNTER
Patient c/o BP of 145/103. Also has a headache. Advised per protocol to be seen within 2-3 days. An appointment was offered but the patient declined. Advised the patient to call back with any further questions or if symptoms worsen.      Reason for Disposition   Systolic BP  >= 160 OR Diastolic >= 100    Additional Information   Negative: Difficult to awaken or acting confused (e.g., disoriented, slurred speech)   Negative: SEVERE difficulty breathing (e.g., struggling for each breath, speaks in single words)   Negative: [1] Weakness of the face, arm or leg on one side of the body AND [2] new-onset   Negative: [1] Numbness (i.e., loss of sensation) of the face, arm or leg on one side of the body AND [2] new-onset   Negative: [1] Chest pain lasts > 5 minutes AND [2] history of heart disease (i.e., heart attack, bypass surgery, angina, angioplasty, CHF)   Negative: [1] Chest pain AND [2] took nitroglycerin AND [3] pain was not relieved   Negative: Sounds like a life-threatening emergency to the triager   Negative: [1] Systolic BP  >= 160 OR Diastolic >= 100 AND [2] cardiac (e.g., breathing difficulty, chest pain) or neurologic symptoms (e.g., new-onset blurred or double vision, unsteady gait)   Negative: [1] Systolic BP  >= 200 OR Diastolic >= 120 AND [2] having NO cardiac or neurologic symptoms   Negative: [1] Systolic BP  >= 180 OR Diastolic >= 110 AND [2] missed most recent dose of blood pressure medication   Negative: Systolic BP  >= 180 OR Diastolic >= 110   Negative: Ran out of BP medications    Protocols used: [facility]

## 2025-03-11 ENCOUNTER — OFFICE VISIT (OUTPATIENT)
Dept: SLEEP MEDICINE | Facility: CLINIC | Age: 35
End: 2025-03-11
Payer: OTHER GOVERNMENT

## 2025-03-11 ENCOUNTER — PATIENT MESSAGE (OUTPATIENT)
Dept: SLEEP MEDICINE | Facility: CLINIC | Age: 35
End: 2025-03-11

## 2025-03-11 DIAGNOSIS — G47.33 OBSTRUCTIVE SLEEP APNEA: Primary | ICD-10-CM

## 2025-03-11 DIAGNOSIS — G40.009 PARTIAL IDIOPATHIC EPILEPSY WITH SEIZURES OF LOCALIZED ONSET, NOT INTRACTABLE, WITHOUT STATUS EPILEPTICUS: ICD-10-CM

## 2025-03-11 DIAGNOSIS — G47.19 OTHER HYPERSOMNIA: ICD-10-CM

## 2025-03-11 PROCEDURE — 98002 SYNCH AUDIO-VIDEO NEW MOD 45: CPT | Mod: 95,,, | Performed by: NURSE PRACTITIONER

## 2025-03-11 NOTE — PROGRESS NOTES
The patient location is: [place]  The chief complaint leading to consultation is: trouble with sleep    Visit type: audiovisual    30 minutes of total time spent on the encounter, which includes face to face time and non-face to face time preparing to see the patient (eg, review of tests), Obtaining and/or reviewing separately obtained history, Documenting clinical information in the electronic or other health record, Independently interpreting results (not separately reported) and communicating results to the patient/family/caregiver, or Care coordination (not separately reported).     Each patient to whom he or she provides medical services by telemedicine is:  (1) informed of the relationship between the physician and patient and the respective role of any other health care provider with respect to management of the patient; and (2) notified that he or she may decline to receive medical services by telemedicine and may withdraw from such care at any time.      Referred by Mauro Gilmore MD     NEW PATIENT VISIT    Pradeep Ro  is a pleasant 34 y.o. male who presents in the Fall of 2024 for sleep evaluation.    See assessment below for further history.    Past Medical History:   Diagnosis Date    Hyperlipidemia, unspecified 02/21/2024    JUAN RAMON (obstructive sleep apnea) 02/24/2024    Primary hypertension 10/6/2024    Seizure-like activity 12/30/2024   Problem List[1]Current Medications[2]    There were no vitals filed for this visit.  Physical Exam:    GEN:   Well-appearing  Psych:  Appropriate affect, demonstrates insight  SKIN:  No rash on the face or bridge of the nose      LABS:   Lab Results   Component Value Date    CO2 32 02/19/2025         Echo  Result Date: 10/6/2024    Left Ventricle: The left ventricle is normal in size. Mildly increased   wall thickness. There is mild concentric hypertrophy. There is normal   systolic function with a visually estimated ejection fraction of 55 - 60%.   There is normal  "diastolic function.    Right Ventricle: Normal right ventricular cavity size. Systolic   function is normal.    Pulmonary Artery: The estimated pulmonary artery systolic pressure is   16 mmHg.           No results found for: "FERRITIN"    RECORDS REVIEWED:      ASSESSMENT    Sig PMH:  PROBLEM DESCRIPTION/ Sx on Presentation  STATUS PLAN     Sx JUAN RAMON   Presentation:     Had 3 seizures in the past year; still unexplained etiology.  Neurologist recommended sleep study to evaluate.    Diagnosed in 2024, on CPAP - but doesn't really use it.  Reports that initially it was ok, but the nasal pillows were causing his nostrils/nose to feel sore and painful.    Download: 17/329 x 3h24m: 5-15 (6.9/9.2/9.8), leak 2.8/12.1/20.2, PS 3, AHI 0.7      PAP history   Dx Study PSG 2.23.24 - AHI 11.3, KYMBERLY 9.9, RAHI 16.4   Mask Nasal pillows   DME    [employer]    CPAP age 2024   PAP altn    Benefits    PROBS          new to me       PAP PLAN   E min 5 cwp    I max 15 cwp   PS/epr 3   RAMP    Other    Altn.    Press  chg      Residual predicted AHI within optimal range.    Will send Rx for new supplies and recommended pt get scheduled for a mask fitting and get restarted using device.    Recommend f/u in 2-3 months to assess his progress.           Daytime symptoms       Does not feel refreshed when waking up in the morning. Does feel excessively sleepy during the day and/or periods of rest.     ESS 13/24 on intake      2/1/2024     3:11 PM   EPWORTH SLEEPINESS SCALE   Sitting and reading 2   Watching TV 1   Sitting, inactive in a public place (e.g. a theatre or a meeting) 1   As a passenger in a car for an hour without a break 3   Lying down to rest in the afternoon when circumstances permit 3   Sitting and talking to someone 0   Sitting quietly after a lunch without alcohol 3   In a car, while stopped for a few minutes in traffic 0   Total score 13         new   -will reassess sleepiness after JUAN RAMON is adequately controlled     Insomnia   "     no - difficulty with sleep onset    no - difficulty with sleep maintenance    SLEEP SCHEDULE   Duration    Wind- down    Envmnt    CBTi    Meds prior    Meds now    Bed Time 10-11PM   Lights out    Latency quickly   Arousals 0   Back to sleep N/a   Stim. ctrl    Wake time 545-6AM   Caffeine    Naps 2-3x/week, 2-3 hrs after work, unrefreshing   Nocturia 0   Work                  new   -will reassess after optimizing treatment of JUAN RAMON       Nocturia     x 0 per sleep period    new          RTC:  will arrange RTC depending on results of sleep testing and in 2-3 months            [1]   Patient Active Problem List  Diagnosis    Erectile dysfunction    Mixed hyperlipidemia    Low back pain, unspecified    JUAN RAMON (obstructive sleep apnea)    Essential hypertension    Loss of consciousness    Class 1 obesity due to excess calories without serious comorbidity in adult    Seizure-like activity    Exposure to potentially hazardous substance    Contact with and (suspected) exposure to other hazardous substances   [2]   Current Outpatient Medications:     acetaminophen (TYLENOL) 500 MG tablet, Take 1-2 tablets (500-1,000 mg total) by mouth 3 (three) times daily as needed for Pain., Disp: , Rfl: 0    amLODIPine (NORVASC) 5 MG tablet, Take 1 tablet (5 mg total) by mouth once daily., Disp: 90 tablet, Rfl: 3    atorvastatin (LIPITOR) 20 MG tablet, Take with food/milk.Take or use exactly as directed.Obtain advice for OTCs.Do not take if pregnant.Avoid grapefruit and grapefruit juice., Disp: , Rfl:     azelastine (ASTELIN) 137 mcg (0.1 %) nasal spray, 1 spray (137 mcg total) by Nasal route 2 (two) times daily., Disp: 30 mL, Rfl: 0    cetirizine (ZYRTEC) 10 MG tablet, Take 1 tablet (10 mg total) by mouth once daily. for 14 days, Disp: 14 tablet, Rfl: 0    diazePAM (VALIUM) 5 MG tablet, Take 1 tablet (5 mg total) by mouth 1 (one) time if needed (Clusters of seizure activirty). Take 1 tablet by mouth if having multiple seizure-like  episodes in succession. Contact DrOziel For questions about appropriate use., Disp: 10 tablet, Rfl: 0    ibuprofen (ADVIL,MOTRIN) 600 MG tablet, Take 1 tablet (600 mg total) by mouth 3 (three) times daily as needed for Pain., Disp: 90 tablet, Rfl: 2    levETIRAcetam (KEPPRA) 1000 MG tablet, Take 1 tablet (1,000 mg total) by mouth 2 (two) times daily., Disp: 180 tablet, Rfl: 1

## 2025-03-20 ENCOUNTER — OFFICE VISIT (OUTPATIENT)
Dept: INTERNAL MEDICINE | Facility: CLINIC | Age: 35
End: 2025-03-20
Payer: OTHER GOVERNMENT

## 2025-03-20 VITALS
WEIGHT: 212.5 LBS | BODY MASS INDEX: 31.47 KG/M2 | HEIGHT: 69 IN | DIASTOLIC BLOOD PRESSURE: 94 MMHG | HEART RATE: 88 BPM | SYSTOLIC BLOOD PRESSURE: 138 MMHG | OXYGEN SATURATION: 98 %

## 2025-03-20 DIAGNOSIS — E55.9 VITAMIN D DEFICIENCY: ICD-10-CM

## 2025-03-20 DIAGNOSIS — G47.33 OSA (OBSTRUCTIVE SLEEP APNEA): ICD-10-CM

## 2025-03-20 DIAGNOSIS — I10 PRIMARY HYPERTENSION: ICD-10-CM

## 2025-03-20 DIAGNOSIS — G40.009 PARTIAL IDIOPATHIC EPILEPSY WITH SEIZURES OF LOCALIZED ONSET, NOT INTRACTABLE, WITHOUT STATUS EPILEPTICUS: Primary | ICD-10-CM

## 2025-03-20 DIAGNOSIS — I10 ESSENTIAL HYPERTENSION: ICD-10-CM

## 2025-03-20 PROCEDURE — 99999 PR PBB SHADOW E&M-EST. PATIENT-LVL III: CPT | Mod: PBBFAC,,, | Performed by: FAMILY MEDICINE

## 2025-03-20 PROCEDURE — 99214 OFFICE O/P EST MOD 30 MIN: CPT | Mod: S$PBB,,, | Performed by: FAMILY MEDICINE

## 2025-03-20 PROCEDURE — 99213 OFFICE O/P EST LOW 20 MIN: CPT | Mod: PBBFAC | Performed by: FAMILY MEDICINE

## 2025-03-20 RX ORDER — AMLODIPINE BESYLATE 5 MG/1
5 TABLET ORAL DAILY
Qty: 90 TABLET | Refills: 3 | Status: SHIPPED | OUTPATIENT
Start: 2025-03-20 | End: 2026-03-15

## 2025-03-20 RX ORDER — LACOSAMIDE 200 MG/1
1 TABLET ORAL 2 TIMES DAILY
COMMUNITY
Start: 2025-03-19 | End: 2025-09-15

## 2025-03-20 RX ORDER — ERGOCALCIFEROL 1.25 MG/1
50000 CAPSULE ORAL
Qty: 12 CAPSULE | Refills: 0 | Status: SHIPPED | OUTPATIENT
Start: 2025-03-20 | End: 2025-06-18

## 2025-03-20 NOTE — PROGRESS NOTES
Subjective:     Patient ID: Pradeep Ro is a 34 y.o. male.   Chief Complaint: Follow-up (6 Week Follow Up)    HPI:  History of Present Illness    CHIEF COMPLAINT:  Patient presents today for follow-up regarding seizure medication management    He attempted to wean off Keppra due to side effects. Following a weaning schedule, Keppra was gradually reduced from 1000mg to 750mg, then to 500mg, and finally to 250mg while transitioning to Vimpat 200mg. On the first day of Vimpat monotherapy, he experienced pre-seizure symptoms including confusion. He is currently stable on a combination regimen of Keppra 500mg and Vimpat 200mg.    He recently visited the ER with elevated blood pressure of 164/101 after missing his morning blood pressure medication. He is working on lifestyle modifications, including gradual reduction of energy drink consumption to decrease caffeine intake.    He recently completed a sleep study with a different sleep specialist and is awaiting new sleep aid equipment, specifically a new mask. He plans to resume use of the sleep aid once the new supplies arrive.    He has completed four doses of Ergocalciferol 25,000 units weekly as prescribed for vitamin D deficiency.         Review of Systems   Constitutional:  Positive for activity change. Negative for chills and fever.   HENT:  Negative for nasal congestion, ear pain and sore throat.    Eyes:  Negative for visual disturbance.   Respiratory:  Negative for cough, chest tightness and shortness of breath.    Cardiovascular:  Negative for chest pain.   Gastrointestinal:  Negative for constipation, diarrhea, nausea and vomiting.   Genitourinary:  Negative for dysuria and frequency.   Integumentary:  Negative for rash.   Neurological:  Positive for seizures, light-headedness and headaches.   Psychiatric/Behavioral:  Negative for suicidal ideas.    All other systems reviewed and are negative.         Objective:      Vitals:    03/20/25 1332   BP: (!) 138/94  "  BP Location: Left arm   Patient Position: Sitting   Pulse: 88   SpO2: 98%   Weight: 96.4 kg (212 lb 8.4 oz)   Height: 5' 9" (1.753 m)      Physical Exam  Constitutional:       General: He is not in acute distress.     Appearance: Normal appearance. He is not ill-appearing.   HENT:      Head: Normocephalic and atraumatic.   Cardiovascular:      Rate and Rhythm: Normal rate.   Pulmonary:      Effort: Pulmonary effort is normal. No respiratory distress.   Neurological:      Mental Status: He is alert and oriented to person, place, and time. Mental status is at baseline.   Psychiatric:         Mood and Affect: Mood normal.         Behavior: Behavior normal.         Thought Content: Thought content normal.         Judgment: Judgment normal.           Assessment/Plan:             ICD-10-CM ICD-9-CM   1. Partial idiopathic epilepsy with seizures of localized onset, not intractable, without status epilepticus  G40.009 345.50   2. JUAN RAMON (obstructive sleep apnea)  G47.33 327.23   3. Essential hypertension  I10 401.9   4. Primary hypertension  I10 401.9   5. Vitamin D deficiency  E55.9 268.9     Orders Placed This Encounter   Procedures    Vitamin D       Assessment & Plan    - Assessed seizure medication regimen: Continued Keppra 500 mg and Vimpat 200 mg for seizure control. Noted near-seizure experience when initially transitioning to Vimpat monotherapy. Acknowledged neurologist's recommendation to maintain current medication regimen.  - Assessed blood pressure management, considering increase in Amlodipine dosage if consistently elevated readings. Increase Amlodipine to 1.5 tablets (7.5 mg) if blood pressure consistently reads above 140/90 at home under normal conditions; if blood pressure remains elevated after 1 week on 7.5 mg Amlodipine, increase to 2 tablets (10 mg).  - Evaluated vitamin D supplementation, recognizing need for long-term treatment before reassessment. Continued Ergocalciferol (Vitamin D) 50,000 IU " weekly.  - Discussed relationship between headaches and elevated blood pressure, noting they can be both cause and effect of each other.    1. Partial idiopathic epilepsy with seizures of localized onset, not intractable, without status epilepticus (Primary)  As above  Continue Keppra-Vimpat dual therapy as rx'd  Continue to f/u neurology    2. JUAN RAMON (obstructive sleep apnea)  Awaiting new supplies    3. Essential hypertension  Not great today  Check at home with plan to increase dose as documented above  - amLODIPine (NORVASC) 5 MG tablet; Take 1 tablet (5 mg total) by mouth once daily.  Dispense: 90 tablet; Refill: 3    4. Vitamin D deficiency  Refill Vitamin D  Lab ordered for progress check  - Vitamin D; Future  - ergocalciferol (VITAMIN D2) 50,000 unit Cap; Take 1 capsule (50,000 Units total) by mouth every 7 days.  Dispense: 12 capsule; Refill: 0      FOLLOW-UP:  Follow up after neurology appointment in 3 months.       Mauro Gilmore MD, [facility]  Family Medicine Physician  [facility]  03/21/2025      This note was generated with the assistance of ambient listening technology. Verbal consent was obtained by the patient and accompanying visitor(s) for the recording of patient appointment to facilitate this note. I attest to having reviewed and edited the generated note for accuracy, though some syntax or spelling errors may persist. Please contact the author of this note for any clarification.

## 2025-04-15 ENCOUNTER — E-VISIT (OUTPATIENT)
Dept: INTERNAL MEDICINE | Facility: CLINIC | Age: 35
End: 2025-04-15
Payer: OTHER GOVERNMENT

## 2025-04-15 DIAGNOSIS — N52.9 ERECTILE DYSFUNCTION, UNSPECIFIED ERECTILE DYSFUNCTION TYPE: Primary | ICD-10-CM

## 2025-04-15 RX ORDER — SILDENAFIL 50 MG/1
50 TABLET, FILM COATED ORAL DAILY PRN
Qty: 18 TABLET | Refills: 3 | Status: SHIPPED | OUTPATIENT
Start: 2025-04-15

## 2025-04-15 NOTE — PROGRESS NOTES
Patient ID: Pradeep Ro is a 34 y.o. male.    Chief Complaint: General Illness (Entered automatically based on patient selection in [facility].)    The patient initiated a request through [employer] on 4/15/2025 for evaluation and management with a chief complaint of General Illness (Entered automatically based on patient selection in [employer].)     I evaluated the questionnaire submission on 4/15/2025.    [facility]    4/15/2025  9:40 AM CDT - Filed by Patient   What do you need help with? Erectile Dysfunction   Do you agree to participate in an E-Visit? Yes   If you have any of the symptoms below, please do not complete an E-Visit. Instead, go to your local emergency room: I acknowledge   I would like to address: Medication for Sexual Dysfunction   Have you ever had an erection? Yes   Have you ever been able to keep an erection through to ejaculation? Yes   In the past month, have you woken up with an erection? Yes   In the past month, have your morning erections happened less often? Yes   Are you able to keep an erection for some sexual activities, but not others? Yes   Do you have any of the following symptoms? Low sex drive;  Unable to keep erection;  Unable to get erection   Before your sexual symptoms started, did you have any of the following? Increased stress   Do you want to address a new or existing medication? I would like to start a new medication that I do not already take   What is the name of the medication that you would like to start? Viagra   Have you taken a similar medication in the past? Yes   What was the name of the similar medication? Viagra   Why are you no longer on that medication? No specific reason    What medical condition is the  medication intended to treat? Erectile dysfunction   Provide any additional information you feel is important. Sometimes I am able to keep an erection through sexual activity but a lot if the times I have lost it sometimes at the  beginning or custodial through.   Please attach any relevant images or files    Are you able to take your vital signs? No         No diagnosis found.     No orders of the defined types were placed in this encounter.           No follow-ups on file.      E-Visit Time Tracking:                         Satisfactory

## 2025-06-17 ENCOUNTER — PATIENT MESSAGE (OUTPATIENT)
Dept: ADMINISTRATIVE | Facility: HOSPITAL | Age: 35
End: 2025-06-17
Payer: OTHER GOVERNMENT

## 2025-06-24 ENCOUNTER — OFFICE VISIT (OUTPATIENT)
Dept: URGENT CARE | Facility: CLINIC | Age: 35
End: 2025-06-24
Payer: OTHER GOVERNMENT

## 2025-06-24 VITALS
SYSTOLIC BLOOD PRESSURE: 120 MMHG | DIASTOLIC BLOOD PRESSURE: 85 MMHG | RESPIRATION RATE: 18 BRPM | HEART RATE: 88 BPM | BODY MASS INDEX: 30.96 KG/M2 | WEIGHT: 209 LBS | OXYGEN SATURATION: 98 % | TEMPERATURE: 99 F | HEIGHT: 69 IN

## 2025-06-24 DIAGNOSIS — R30.0 DYSURIA: Primary | ICD-10-CM

## 2025-06-24 DIAGNOSIS — Z20.2 POSSIBLE EXPOSURE TO STD: ICD-10-CM

## 2025-06-24 LAB
BILIRUBIN, UA POC OHS: ABNORMAL
BLOOD, UA POC OHS: NEGATIVE
CLARITY, UA POC OHS: CLEAR
COLOR, UA POC OHS: YELLOW
GLUCOSE, UA POC OHS: NEGATIVE
KETONES, UA POC OHS: 40
LEUKOCYTES, UA POC OHS: NEGATIVE
NITRITE, UA POC OHS: NEGATIVE
PH, UA POC OHS: 6
PROTEIN, UA POC OHS: 30
SPECIFIC GRAVITY, UA POC OHS: 1.02
UROBILINOGEN, UA POC OHS: 1

## 2025-06-24 PROCEDURE — 87563 M. GENITALIUM AMP PROBE: CPT | Performed by: FAMILY MEDICINE

## 2025-06-24 PROCEDURE — 80074 ACUTE HEPATITIS PANEL: CPT | Performed by: FAMILY MEDICINE

## 2025-06-24 PROCEDURE — 86593 SYPHILIS TEST NON-TREP QUANT: CPT | Performed by: FAMILY MEDICINE

## 2025-06-24 PROCEDURE — 87491 CHLMYD TRACH DNA AMP PROBE: CPT | Performed by: FAMILY MEDICINE

## 2025-06-24 PROCEDURE — 87389 HIV-1 AG W/HIV-1&-2 AB AG IA: CPT | Performed by: FAMILY MEDICINE

## 2025-06-24 PROCEDURE — 87661 TRICHOMONAS VAGINALIS AMPLIF: CPT | Performed by: FAMILY MEDICINE

## 2025-06-24 PROCEDURE — 81003 URINALYSIS AUTO W/O SCOPE: CPT | Mod: QW,S$GLB,, | Performed by: FAMILY MEDICINE

## 2025-06-24 PROCEDURE — 99214 OFFICE O/P EST MOD 30 MIN: CPT | Mod: S$GLB,,, | Performed by: FAMILY MEDICINE

## 2025-06-24 RX ORDER — LEVETIRACETAM 500 MG/1
500 TABLET ORAL 2 TIMES DAILY
COMMUNITY
Start: 2025-03-19

## 2025-06-24 NOTE — PROGRESS NOTES
"Subjective:      Patient ID: Pradeep Ro is a 35 y.o. male.    Vitals:  height is 5' 9" (1.753 m) and weight is 94.8 kg (209 lb). His oral temperature is 98.7 °F (37.1 °C). His blood pressure is 120/85 and his pulse is 88. His respiration is 18 and oxygen saturation is 98%.     Chief Complaint: Exposure to STD    Patient has had 1 week of burning while urinating. He also states to have a new partner in last month.    Exposure to STD  The patient's pertinent negatives include no genital itching, genital lesions, pelvic pain, penile discharge, penile pain, priapism, scrotal swelling or testicular pain. This is a new problem. The current episode started in the past 7 days. Pertinent negatives include no abdominal pain, anorexia, chest pain, fever, flank pain, nausea, painful intercourse, rash or shortness of breath.       Constitution: Negative for fever.   Cardiovascular:  Negative for chest pain.   Respiratory:  Negative for shortness of breath.    Gastrointestinal:  Negative for abdominal pain and nausea.   Genitourinary:  Negative for flank pain, penile discharge, penile pain, scrotal swelling, testicular pain and pelvic pain.   Skin:  Negative for rash.      Objective:     Physical Exam   Constitutional: He is oriented to person, place, and time. He appears well-developed.  Non-toxic appearance. He does not appear ill. No distress.   HENT:   Head: Normocephalic and atraumatic.   Ears:   Right Ear: Tympanic membrane, external ear and ear canal normal. no impacted cerumen  Left Ear: Tympanic membrane, external ear and ear canal normal. no impacted cerumen  Nose: Nose normal. No nasal deformity or congestion. No epistaxis.   Mouth/Throat: Oropharynx is clear and moist and mucous membranes are normal. No oropharyngeal exudate or posterior oropharyngeal erythema.   Eyes: Conjunctivae and lids are normal.   Neck: Trachea normal and phonation normal. Neck supple.   Cardiovascular: Normal rate, regular rhythm and " normal heart sounds.   No murmur heard.  Pulmonary/Chest: Effort normal and breath sounds normal. No stridor. No respiratory distress. He has no wheezes. He has no rhonchi. He has no rales.   Abdominal: Normal appearance and bowel sounds are normal. He exhibits no distension. Soft. There is no abdominal tenderness. There is no left CVA tenderness and no right CVA tenderness.   Genitourinary:    Testes and penis normal.     Musculoskeletal: Normal range of motion.         General: Normal range of motion.   Neurological: He is alert, oriented to person, place, and time and at baseline. He has normal reflexes.   Skin: Skin is warm, dry, intact and not diaphoretic.   Psychiatric: His speech is normal and behavior is normal. Judgment and thought content normal.   Nursing note and vitals reviewed.      Assessment:     1. Dysuria    2. Possible exposure to STD        Plan:   Discussed exam findings/results/diagnosis/plan with patient. Advised to f/u with PCP within 2-5 days. ER precautions given if symptoms get any worse. All questions answered. Patient verbally understood and agreed with treatment plan.  Educational materials and instructions regarding the visit diagnosis and management provided.     Dysuria  -     POCT Urinalysis(Instrument)    Possible exposure to STD  -     C. trachomatis/N. gonorrhoeae by AMP DNA  -     Cancel: Mycoplasma genitalium Molecular Detection, PCR Urine  -     HIV 1/2 Ag/Ab (4th Gen)  -     Treponema Pallidium Antibodies IgG, IgM  -     HEPATITIS PANEL, ACUTE  -     Trichomonas vaginalis, RNA, Qual  -     Mycoplasma genitalium Molecular Detection, PCR Urine

## 2025-06-25 LAB
HAV IGM SERPL QL IA: NORMAL
HBV CORE IGM SERPL QL IA: NORMAL
HBV SURFACE AG SERPL QL IA: NORMAL
HCV AB SERPL QL IA: NORMAL
HIV 1+2 AB+HIV1 P24 AG SERPL QL IA: NORMAL
T PALLIDUM IGG+IGM SER QL: NORMAL

## 2025-06-27 LAB
MYCOPLASMA GENITALIUM RESULT: NEGATIVE
SPECIMEN SOURCE: NORMAL
SPECIMEN SOURCE: NORMAL
T VAGINALIS RRNA SPEC QL NAA+PROBE: NEGATIVE

## 2025-06-28 ENCOUNTER — RESULTS FOLLOW-UP (OUTPATIENT)
Dept: URGENT CARE | Facility: CLINIC | Age: 35
End: 2025-06-28

## 2025-06-30 ENCOUNTER — PATIENT OUTREACH (OUTPATIENT)
Dept: ADMINISTRATIVE | Facility: HOSPITAL | Age: 35
End: 2025-06-30
Payer: OTHER GOVERNMENT

## 2025-06-30 VITALS — DIASTOLIC BLOOD PRESSURE: 85 MMHG | SYSTOLIC BLOOD PRESSURE: 120 MMHG

## 2025-07-25 ENCOUNTER — TELEPHONE (OUTPATIENT)
Dept: NEUROLOGY | Facility: CLINIC | Age: 35
End: 2025-07-25
Payer: OTHER GOVERNMENT

## 2025-08-26 ENCOUNTER — OFFICE VISIT (OUTPATIENT)
Dept: NEUROLOGY | Facility: CLINIC | Age: 35
End: 2025-08-26
Payer: OTHER GOVERNMENT

## 2025-08-26 VITALS
SYSTOLIC BLOOD PRESSURE: 136 MMHG | HEART RATE: 91 BPM | WEIGHT: 199.06 LBS | BODY MASS INDEX: 29.48 KG/M2 | HEIGHT: 69 IN | DIASTOLIC BLOOD PRESSURE: 89 MMHG

## 2025-08-26 DIAGNOSIS — G40.909 NONINTRACTABLE EPILEPSY WITHOUT STATUS EPILEPTICUS, UNSPECIFIED EPILEPSY TYPE: Primary | ICD-10-CM

## 2025-08-26 PROCEDURE — 99999 PR PBB SHADOW E&M-EST. PATIENT-LVL III: CPT | Mod: PBBFAC,,,

## 2025-08-26 PROCEDURE — 99213 OFFICE O/P EST LOW 20 MIN: CPT | Mod: PBBFAC

## 2025-09-04 DIAGNOSIS — G40.909 NONINTRACTABLE EPILEPSY WITHOUT STATUS EPILEPTICUS, UNSPECIFIED EPILEPSY TYPE: ICD-10-CM

## (undated) DEVICE — CATH JACKY RADIAL 3.5 110CM

## (undated) DEVICE — PAD DEFIB CADENCE ADULT R2

## (undated) DEVICE — PACK CATH LAB

## (undated) DEVICE — OMNIPAQUE CONTRAST 350MG/100ML

## (undated) DEVICE — BAND TR COMP DEVICE REG 24CM

## (undated) DEVICE — WIRE GUIDE SAFE-T-J .035 260CM

## (undated) DEVICE — KIT MANIFOLD LOW PRESS TUBING

## (undated) DEVICE — ANGIOTOUCH KIT

## (undated) DEVICE — KIT SYR REUSABLE

## (undated) DEVICE — KIT GLIDESHEATH SLEND 6FR 10CM

## (undated) DEVICE — PAD RADI FEMORAL